# Patient Record
Sex: FEMALE | Race: WHITE | NOT HISPANIC OR LATINO | Employment: OTHER | URBAN - METROPOLITAN AREA
[De-identification: names, ages, dates, MRNs, and addresses within clinical notes are randomized per-mention and may not be internally consistent; named-entity substitution may affect disease eponyms.]

---

## 2017-04-20 ENCOUNTER — TRANSCRIBE ORDERS (OUTPATIENT)
Dept: ADMINISTRATIVE | Facility: HOSPITAL | Age: 77
End: 2017-04-20

## 2017-04-20 DIAGNOSIS — Z12.31 VISIT FOR SCREENING MAMMOGRAM: Primary | ICD-10-CM

## 2017-06-05 ENCOUNTER — APPOINTMENT (OUTPATIENT)
Dept: MAMMOGRAPHY | Facility: HOSPITAL | Age: 77
End: 2017-06-05
Attending: OBSTETRICS & GYNECOLOGY

## 2017-07-17 ENCOUNTER — HOSPITAL ENCOUNTER (OUTPATIENT)
Dept: MAMMOGRAPHY | Facility: HOSPITAL | Age: 77
Discharge: HOME | End: 2017-07-17
Attending: OBSTETRICS & GYNECOLOGY | Admitting: OBSTETRICS & GYNECOLOGY

## 2017-07-17 DIAGNOSIS — Z12.31 VISIT FOR SCREENING MAMMOGRAM: ICD-10-CM

## 2017-07-17 PROCEDURE — G0202 SCR MAMMO BI INCL CAD: HCPCS | Performed by: RADIOLOGY

## 2017-07-17 PROCEDURE — 77063 BREAST TOMOSYNTHESIS BI: CPT

## 2017-07-17 PROCEDURE — G0202 SCR MAMMO BI INCL CAD: HCPCS

## 2017-07-17 PROCEDURE — 77063 BREAST TOMOSYNTHESIS BI: CPT | Performed by: RADIOLOGY

## 2018-06-11 ENCOUNTER — TRANSCRIBE ORDERS (OUTPATIENT)
Dept: ADMINISTRATIVE | Facility: HOSPITAL | Age: 78
End: 2018-06-11

## 2018-06-11 DIAGNOSIS — Z12.31 VISIT FOR SCREENING MAMMOGRAM: Primary | ICD-10-CM

## 2018-07-19 ENCOUNTER — APPOINTMENT (OUTPATIENT)
Dept: MAMMOGRAPHY | Facility: HOSPITAL | Age: 78
End: 2018-07-19
Attending: FAMILY MEDICINE

## 2018-07-20 ENCOUNTER — APPOINTMENT (OUTPATIENT)
Dept: MAMMOGRAPHY | Facility: HOSPITAL | Age: 78
End: 2018-07-20
Attending: FAMILY MEDICINE

## 2018-08-03 ENCOUNTER — HOSPITAL ENCOUNTER (OUTPATIENT)
Dept: MAMMOGRAPHY | Facility: HOSPITAL | Age: 78
Discharge: HOME | End: 2018-08-03
Attending: FAMILY MEDICINE | Admitting: FAMILY MEDICINE

## 2018-08-03 DIAGNOSIS — Z12.31 VISIT FOR SCREENING MAMMOGRAM: ICD-10-CM

## 2018-08-03 PROCEDURE — 77067 SCR MAMMO BI INCL CAD: CPT | Performed by: RADIOLOGY

## 2018-08-03 PROCEDURE — 77063 BREAST TOMOSYNTHESIS BI: CPT | Performed by: RADIOLOGY

## 2018-08-03 PROCEDURE — 77067 SCR MAMMO BI INCL CAD: CPT

## 2018-08-03 PROCEDURE — 77063 BREAST TOMOSYNTHESIS BI: CPT

## 2019-01-17 ENCOUNTER — TRANSCRIBE ORDERS (OUTPATIENT)
Dept: ADMINISTRATIVE | Facility: HOSPITAL | Age: 79
End: 2019-01-17

## 2019-01-17 DIAGNOSIS — R51.9 NONINTRACTABLE HEADACHE, UNSPECIFIED CHRONICITY PATTERN, UNSPECIFIED HEADACHE TYPE: Primary | ICD-10-CM

## 2019-06-24 ENCOUNTER — TRANSCRIBE ORDERS (OUTPATIENT)
Dept: ADMINISTRATIVE | Facility: HOSPITAL | Age: 79
End: 2019-06-24

## 2019-06-24 DIAGNOSIS — Z12.31 VISIT FOR SCREENING MAMMOGRAM: Primary | ICD-10-CM

## 2019-08-05 ENCOUNTER — HOSPITAL ENCOUNTER (OUTPATIENT)
Dept: MAMMOGRAPHY | Facility: HOSPITAL | Age: 79
Discharge: HOME | End: 2019-08-05
Admitting: FAMILY MEDICINE

## 2019-08-05 DIAGNOSIS — Z12.31 VISIT FOR SCREENING MAMMOGRAM: ICD-10-CM

## 2019-08-05 PROCEDURE — 77063 BREAST TOMOSYNTHESIS BI: CPT

## 2019-08-05 PROCEDURE — 77063 BREAST TOMOSYNTHESIS BI: CPT | Performed by: RADIOLOGY

## 2019-08-05 PROCEDURE — 77067 SCR MAMMO BI INCL CAD: CPT

## 2019-08-05 PROCEDURE — 77067 SCR MAMMO BI INCL CAD: CPT | Performed by: RADIOLOGY

## 2019-08-07 ENCOUNTER — TRANSCRIBE ORDERS (OUTPATIENT)
Dept: ADMINISTRATIVE | Facility: HOSPITAL | Age: 79
End: 2019-08-07

## 2019-08-07 DIAGNOSIS — Z12.31 VISIT FOR SCREENING MAMMOGRAM: Primary | ICD-10-CM

## 2020-08-06 ENCOUNTER — HOSPITAL ENCOUNTER (OUTPATIENT)
Dept: MAMMOGRAPHY | Facility: HOSPITAL | Age: 80
Discharge: HOME | End: 2020-08-06
Admitting: FAMILY MEDICINE

## 2020-08-06 DIAGNOSIS — Z12.31 VISIT FOR SCREENING MAMMOGRAM: ICD-10-CM

## 2020-08-06 PROCEDURE — 77063 BREAST TOMOSYNTHESIS BI: CPT | Performed by: RADIOLOGY

## 2020-08-06 PROCEDURE — 77067 SCR MAMMO BI INCL CAD: CPT | Performed by: RADIOLOGY

## 2020-08-06 PROCEDURE — 77067 SCR MAMMO BI INCL CAD: CPT

## 2020-08-06 PROCEDURE — 77063 BREAST TOMOSYNTHESIS BI: CPT

## 2020-08-27 ENCOUNTER — RESULTS ENCOUNTER (OUTPATIENT)
Dept: NEUROLOGY | Facility: CLINIC | Age: 80
End: 2020-08-27

## 2020-08-27 ENCOUNTER — LAB REQUISITION (OUTPATIENT)
Dept: LAB | Facility: HOSPITAL | Age: 80
End: 2020-08-27

## 2020-08-27 ENCOUNTER — OFFICE VISIT (OUTPATIENT)
Dept: NEUROLOGY | Facility: CLINIC | Age: 80
End: 2020-08-27

## 2020-08-27 VITALS
OXYGEN SATURATION: 97 % | TEMPERATURE: 96.9 F | WEIGHT: 116 LBS | DIASTOLIC BLOOD PRESSURE: 70 MMHG | HEIGHT: 61 IN | BODY MASS INDEX: 21.9 KG/M2 | SYSTOLIC BLOOD PRESSURE: 118 MMHG | HEART RATE: 67 BPM

## 2020-08-27 DIAGNOSIS — R20.0 NUMBNESS AND TINGLING OF BOTH FEET: ICD-10-CM

## 2020-08-27 DIAGNOSIS — R27.0 ATAXIA: Primary | ICD-10-CM

## 2020-08-27 DIAGNOSIS — R20.2 NUMBNESS AND TINGLING OF BOTH FEET: ICD-10-CM

## 2020-08-27 DIAGNOSIS — R79.9 ABNORMAL FINDING OF BLOOD CHEMISTRY, UNSPECIFIED: ICD-10-CM

## 2020-08-27 DIAGNOSIS — R42 DIZZINESS: ICD-10-CM

## 2020-08-27 DIAGNOSIS — Z00.00 ROUTINE GENERAL MEDICAL EXAMINATION AT A HEALTH CARE FACILITY: ICD-10-CM

## 2020-08-27 PROCEDURE — 36415 COLL VENOUS BLD VENIPUNCTURE: CPT | Performed by: NURSE PRACTITIONER

## 2020-08-27 PROCEDURE — 99204 OFFICE O/P NEW MOD 45 MIN: CPT | Performed by: NURSE PRACTITIONER

## 2020-08-27 RX ORDER — LORATADINE 10 MG/1
TABLET ORAL DAILY
COMMUNITY
End: 2022-04-18

## 2020-08-27 RX ORDER — IRBESARTAN 75 MG/1
TABLET ORAL
COMMUNITY
Start: 2020-08-13 | End: 2020-12-03

## 2020-08-27 RX ORDER — OMEPRAZOLE 20 MG/1
CAPSULE, DELAYED RELEASE ORAL
COMMUNITY
Start: 2020-08-25 | End: 2021-06-30 | Stop reason: SDUPTHER

## 2020-08-27 NOTE — PROGRESS NOTES
Subjective:     Patient ID: Cecile Bennett is a 80 y.o. female.    CC:   Chief Complaint   Patient presents with   • Dizziness       HPI:   History of Present Illness   Is a very pleasant 80-year-old female who presents to establish care with neurology for dizziness present for several years.  She was referred by her primary care provider for further evaluation.  She tells me she cannot recall the exact date of when her dizziness began but she knows it is been over 2-1/2 years.  She did have an MRI of the brain without contrast on 6/4/2013 for some left facial pain and headaches and she cannot remember if she was having dizziness at the time or not.  We did review the imaging and radiology report today in office which was completed at [facility].  She does have age-related general cerebral atrophy with no significant white matter changes and no acute intracranial abnormalities.  Radiologist also read as normal for age.  In June 2018 she went and saw Dr. Hugh Spicer with [facility] and he was unsure of the exact cause of her dizziness but have referred her to [facility] for tilt table test which the patient tells me was normal, for vestibular rehab which she tells me was not helpful, Epley maneuver which was not helpful and eventually she just did not return because she still no significant improvement.  She tells me she has tried meclizine and this did not help her symptoms at all.  She tells me she gets dizzy with position changes such as bending over or getting up from a chair.  She does have a cardiologist Dr. Luli Moreno with [facility] which she sees every 6 months.  I can see some of the notes in epic for review and it does show that she has carotid artery stenosis, dizziness, aortic valve stenosis and a history of cardiac arrhythmia.  She is not on aspirin due to allergy.  She is not on a statin.  She is on one blood pressure medication.  Otherwise she has been very  healthy.  She does not get any more headaches except very rarely with a history of migraine.  She tells me she is very active and gets up very quickly and when she stands she feels like she is going to fall backwards or to the side and has to hold onto a wall.  She does admit that she does not drink much water.  She has not had any falls in 2 years.  She does have near falls on a daily basis.  She feels that her dizziness may be getting worse.  She denies any urinary or bowel incontinence, denies any confusion or memory issues.  She does note that she has some numbness and tingling in both of her toes more on the left side than the right side.  She has had this for a few years but never really thought anything about it.  She is not diabetic, does not have any history of tobacco or alcohol abuse.  She did have breast cancer on the right side and underwent radiation therapy and lumpectomy.  She denies any head injuries, concussions or any known calls for her symptoms.  She has no dizziness with sitting, rolling over in bed or laying flat.    Orthostatic blood pressure checks today laying down blood pressure 130/70, heart rate 60 and oxygen saturation 98%.  Sitting blood pressure 120/60, heart rate 61 and oxygen saturation 98%.  Blood pressure standing 120/70, heart rate 62 and 98% oxygen saturation all on room air.    The following portions of the patient's history were reviewed and updated as appropriate: allergies, current medications, past family history, past medical history, past social history, past surgical history and problem list.    Past Medical History:   Diagnosis Date   • Breast cancer (CMS[facility]) 2009    RIGHT   • Hx of radiation therapy 2009    RT BREAST CANCER   • Osteoporosis        Past Surgical History:   Procedure Laterality Date   • BREAST BIOPSY Right 2009    Stereotactic biopsy   • BREAST LUMPECTOMY Right 2009       Social History     Socioeconomic History   • Marital status:      Spouse  name: Not on file   • Number of children: Not on file   • Years of education: Not on file   • Highest education level: Not on file   Tobacco Use   • Smoking status: Former Smoker     Last attempt to quit:      Years since quittin.6   • Smokeless tobacco: Never Used   Substance and Sexual Activity   • Alcohol use: Yes     Frequency: 2-4 times a month     Drinks per session: 1 or 2   • Drug use: Never   • Sexual activity: Not Currently       Family History   Problem Relation Age of Onset   • Breast cancer Sister 67   • Breast cancer Cousin 40   • Dementia Brother    • Pancreatic cancer Brother    • Stroke Maternal Grandmother    • Ovarian cancer Neg Hx         Review of Systems   Constitutional: Negative for chills, fatigue, fever and unexpected weight change.   HENT: Negative for ear pain, hearing loss, nosebleeds, rhinorrhea and sore throat.    Eyes: Negative for photophobia, pain, discharge, itching and visual disturbance.   Respiratory: Negative for cough, chest tightness, shortness of breath and wheezing.    Cardiovascular: Negative for chest pain, palpitations and leg swelling.   Gastrointestinal: Negative for abdominal pain, blood in stool, constipation, diarrhea, nausea and vomiting.   Genitourinary: Negative for dysuria, frequency, hematuria and urgency.   Musculoskeletal: Negative for arthralgias, back pain, gait problem, joint swelling, myalgias, neck pain and neck stiffness.   Skin: Negative for rash and wound.   Allergic/Immunologic: Negative for environmental allergies and food allergies.   Neurological: Negative for dizziness, tremors, seizures, syncope, speech difficulty, weakness, light-headedness, numbness and headaches.   Hematological: Negative for adenopathy. Does not bruise/bleed easily.   Psychiatric/Behavioral: Negative for agitation, confusion, decreased concentration, hallucinations, sleep disturbance and suicidal ideas. The patient is not nervous/anxious.         Objective:  BP  "118/70   Pulse 67   Temp 96.9 °F (36.1 °C)   Ht 154.9 cm (61\")   Wt 52.6 kg (116 lb)   SpO2 97%   BMI 21.92 kg/m²     Neurologic Exam     Mental Status   Oriented to person, place, and time.   Registration: recalls 3 of 3 objects. Recall at 5 minutes: recalls 3 of 3 objects. Follows 3 step commands.   Attention: normal. Concentration: normal.   Speech: speech is normal   Level of consciousness: alert  Knowledge: good and consistent with education. Able to perform simple calculations.   Able to name object. Able to read. Able to repeat. Able to write. Normal comprehension.     Cranial Nerves   Cranial nerves II through XII intact.     CN III, IV, VI   Nystagmus: none     No dizziness or nystagmus with EOMs     Motor Exam   Muscle bulk: normal  Overall muscle tone: normal    Strength   Strength 5/5 throughout.     Sensory Exam   Light touch normal.   Right leg vibration: decreased from toes  Left leg vibration: decreased from toes (more on left than right and chronic per patient)  Proprioception normal.   Pinprick normal.     Gait, Coordination, and Reflexes     Gait  Gait: (ataxia, some mild shuffling, no tremor, good arm swing, sway with position changes sitting to standing and turning)    Coordination   Romberg: positive (falls, leans backward with eyes closed)  Finger to nose coordination: normal  Heel to shin coordination: normal  Tandem walking coordination: abnormal    Tremor   Resting tremor: absent  Intention tremor: absent  Action tremor: absent    Reflexes   Right brachioradialis: 2+  Left brachioradialis: 2+  Right biceps: 2+  Left biceps: 2+  Right triceps: 2+  Left triceps: 2+  Right patellar: 1+  Left patellar: 1+  Right achilles: 1+  Left achilles: 1+  Right : 2+  Left : 2+  Right plantar: normal  Left plantar: normal  Right Thompson: absent  Left Thompson: absent  Right ankle clonus: absent  Left ankle clonus: absent      Physical Exam   Constitutional: She is oriented to person, place, " and time. She appears well-developed and well-nourished.   Eyes:   Fundoscopic exam:       The right eye shows red reflex.        The left eye shows red reflex.   Neck: Carotid bruit is not present.   Cardiovascular: Normal rate, regular rhythm, S1 normal, S2 normal and normal heart sounds.   Pulses:       Dorsalis pedis pulses are 1+ on the right side, and 1+ on the left side.        Posterior tibial pulses are 1+ on the right side, and 1+ on the left side.     1+ edema left ankle, trace right ankle, chronic per patient   Pulmonary/Chest: Effort normal and breath sounds normal.   Neurological: She is oriented to person, place, and time. She has normal strength. She has an abnormal Romberg Test (falls, leans backward with eyes closed) and an abnormal Tandem Gait Test. She has a normal Finger-Nose-Finger Test and a normal Heel to Shin Test.   Reflex Scores:       Tricep reflexes are 2+ on the right side and 2+ on the left side.       Bicep reflexes are 2+ on the right side and 2+ on the left side.       Brachioradialis reflexes are 2+ on the right side and 2+ on the left side.       Patellar reflexes are 1+ on the right side and 1+ on the left side.       Achilles reflexes are 1+ on the right side and 1+ on the left side.  Psychiatric: She has a normal mood and affect. Her speech is normal and behavior is normal. Judgment and thought content normal. Cognition and memory are normal.       Assessment/Plan:       Cecile was seen today for dizziness.    Diagnoses and all orders for this visit:    Ataxia  -     MRI Brain With & Without Contrast; Future  -     Ambulatory Referral to Physical Therapy Evaluate and treat, Neuro    Dizziness  -     MRI Brain With & Without Contrast; Future  -     Ambulatory Referral to Physical Therapy Evaluate and treat, Neuro    Numbness and tingling of both feet  -     EMG & Nerve Conduction Test; Future  -     Methylmalonic Acid, Serum; Future  -     Vitamin B12 & Folate; Future  -      Hemoglobin A1c; Future  -     C-reactive Protein; Future  -     Sedimentation Rate; Future  -     CK Total & CKMB; Future    Abnormal finding of blood chemistry, unspecified   -     Hemoglobin A1c; Future           She has had dizziness for many years.  Will order MRI of the brain to rule out any etiology especially with history of cancer.  Referral to physical therapy for balance therapy.  She does have a guarded, somewhat ataxic and some shuffling gait but I do not see any other symptoms of Parkinson's disease.  This may be due to her trying to keep her balance.  She does have numbness and tingling of her feet which could be a sign of neuropathy which could also worsen balance I recommend labs and EMG with [facility].  We are going to follow-up in office in 6 to 8 weeks to reevaluate.  She prefers no medications.  I also recommend with the note of carotid artery stenosis and aortic valve stenosis that she see her cardiologist as recommended.  She is currently not on aspirin due to allergy and not on statin but I am not sure the reasoning for this.  Would recommend she discuss with cardiology if statin therapy would be appropriate for the carotid artery stenosis.  Reviewed medications, potential side effects and signs and symptoms to report. Discussed risk versus benefits of treatment plan with patient and/or family-including medications, labs and radiology that may be ordered. Addressed questions and concerns during visit. Patient and/or family verbalized understanding and agree with plan.    AS THE PROVIDER, I PERSONALLY WORE PPE DURING ENTIRE FACE TO FACE ENCOUNTER IN CLINIC WITH THE PATIENT. PATIENT ALSO WORE PPE DURING ENTIRE FACE TO FACE ENCOUNTER EXCEPT FOR A MAX OF 30 SECONDS DURING NEUROLOGICAL EVALUATION OF CRANIAL NERVES AND THEN MASK WAS PLACED BACK OVER PATIENT FACE FOR REMAINDER OF VISIT. I WASHED MY HANDS BEFORE AND AFTER VISIT.    During this visit the following were done:  Labs Reviewed [x]    Labs  Ordered []    Radiology Reports Reviewed [x]    Radiology Ordered [x]    PCP Records Reviewed [x]    Referring Provider Records Reviewed [x]    ER Records Reviewed []    Hospital Records Reviewed []    History Obtained From Family []    Radiology Images Reviewed [x]    Other Reviewed [x]    Records Requested []      Rianna Steward, APRN  8/27/2020

## 2020-08-30 LAB
CK MB SERPL-MCNC: 1.5 NG/ML (ref 0–5.3)
CK SERPL-CCNC: 122 U/L (ref 32–182)
CRP SERPL-MCNC: <1 MG/L (ref 0–10)
ERYTHROCYTE [SEDIMENTATION RATE] IN BLOOD BY WESTERGREN METHOD: 21 MM/HR (ref 0–40)
FOLATE SERPL-MCNC: 9.5 NG/ML
HBA1C MFR BLD: 5.9 % (ref 4.8–5.6)
Lab: NORMAL
METHYLMALONATE SERPL-SCNC: 162 NMOL/L (ref 0–378)
VIT B12 SERPL-MCNC: 539 PG/ML (ref 232–1245)

## 2020-08-31 ENCOUNTER — TELEPHONE (OUTPATIENT)
Dept: NEUROLOGY | Facility: CLINIC | Age: 80
End: 2020-08-31

## 2020-08-31 NOTE — TELEPHONE ENCOUNTER
----- Message from ADELE Steve sent at 8/31/2020  9:51 AM EDT -----  Please notify the patient that her blood work overall looks excellent.  Her muscle enzymes, vitamin B12, folic acid and inflammatory markers are all normal.  Her diabetes testing shows her in the prediabetes range at 5.9% and anything over 6.4 is considered diabetes.  She can monitor this with her PCP every 6-12 months.  Please fax a copy of labs to her PCP.  Thanks, ADELE Ricks.

## 2020-08-31 NOTE — PROGRESS NOTES
Please notify the patient that her blood work overall looks excellent.  Her muscle enzymes, vitamin B12, folic acid and inflammatory markers are all normal.  Her diabetes testing shows her in the prediabetes range at 5.9% and anything over 6.4 is considered diabetes.  She can monitor this with her PCP every 6-12 months.  Please fax a copy of labs to her PCP.  Thanks, ADELE Ricks.

## 2020-09-02 ENCOUNTER — TELEPHONE (OUTPATIENT)
Dept: NEUROLOGY | Facility: CLINIC | Age: 80
End: 2020-09-02

## 2020-09-02 NOTE — TELEPHONE ENCOUNTER
PT WANTED TO CALL IN AND ADVISE FLOR PAINTER THAT HER INSURANCE WILL NOT BE COVERING MUCH ON THE PRICE OF THE MRI OR THE EMG AND THERES NO WAY SHE CAN BE ABLE TO DO THESE TEST ANY TIME SOON.       Cecile Bennett (Self) [phone] (H)       SHE IS GOING TO SEE IF BILLING WILL HELP HER WITH A PAYMENT PLAN FIRST.

## 2020-09-02 NOTE — TELEPHONE ENCOUNTER
The emg/ncvs can wait and can be cancelled for now. I would at least get the MRI if possible so we can figure out what is causing her symptoms not to improve. She can contact the Financial assistance department at [facility] for MRI payment assistance-this is income based and they can provide additional information for her-that number is #[phone]. She can call them. Thanks, Rianna

## 2020-09-19 ENCOUNTER — HOSPITAL ENCOUNTER (OUTPATIENT)
Dept: MRI IMAGING | Facility: HOSPITAL | Age: 80
Discharge: HOME | End: 2020-09-19
Admitting: NURSE PRACTITIONER

## 2020-09-19 DIAGNOSIS — R27.0 ATAXIA: ICD-10-CM

## 2020-09-19 DIAGNOSIS — R42 DIZZINESS: ICD-10-CM

## 2020-09-19 PROCEDURE — 70551 MRI BRAIN STEM W/O DYE: CPT

## 2020-09-21 ENCOUNTER — TELEPHONE (OUTPATIENT)
Dept: NEUROLOGY | Facility: CLINIC | Age: 80
End: 2020-09-21

## 2020-09-21 NOTE — PROGRESS NOTES
Please notify the patient that the MRI of her brain without contrast appears normal for her age.  No strokes, brain lesions, tumors or other abnormalities are seen.  They were not able to complete the contrasted study due to her metal allergy.  Her brain looks very healthy and normal for her age.  We will follow-up as scheduled in office.  Thanks, ADELE Ricks.  Please fax a copy of MRI of the brain to PCP.

## 2020-09-21 NOTE — TELEPHONE ENCOUNTER
She does not have to have the emg/ncvs but we will not be able to confirm the neuropathy diagnosis. We can treat her symptomatically if she does not want additional testing done.

## 2020-09-21 NOTE — TELEPHONE ENCOUNTER
----- Message from ADELE Steve sent at 9/21/2020 10:31 AM EDT -----  Please notify the patient that the MRI of her brain without contrast appears normal for her age.  No strokes, brain lesions, tumors or other abnormalities are seen.  They were not able to complete the contrasted study due to her metal allergy.  Her brain looks very healthy and normal for her age.  We will follow-up as scheduled in office.  Thanks, ADELE Ricks.  Please fax a copy of MRI of the brain to PCP.

## 2020-09-21 NOTE — TELEPHONE ENCOUNTER
Pt is aware of the results and would like to know if she absolutely needs the EMG, she is afraid of all the needles and pain, or if there is another test in place of this one?

## 2020-10-08 ENCOUNTER — OFFICE VISIT (OUTPATIENT)
Dept: NEUROLOGY | Facility: CLINIC | Age: 80
End: 2020-10-08

## 2020-10-08 VITALS
WEIGHT: 116 LBS | BODY MASS INDEX: 22.78 KG/M2 | TEMPERATURE: 97.4 F | DIASTOLIC BLOOD PRESSURE: 78 MMHG | HEART RATE: 78 BPM | HEIGHT: 60 IN | OXYGEN SATURATION: 98 % | SYSTOLIC BLOOD PRESSURE: 122 MMHG

## 2020-10-08 DIAGNOSIS — R20.0 NUMBNESS AND TINGLING OF BOTH FEET: ICD-10-CM

## 2020-10-08 DIAGNOSIS — R27.0 ATAXIA: Primary | ICD-10-CM

## 2020-10-08 DIAGNOSIS — R42 DIZZINESS: ICD-10-CM

## 2020-10-08 DIAGNOSIS — R20.2 NUMBNESS AND TINGLING OF BOTH FEET: ICD-10-CM

## 2020-10-08 PROCEDURE — 99214 OFFICE O/P EST MOD 30 MIN: CPT | Performed by: NURSE PRACTITIONER

## 2020-10-08 RX ORDER — MECLIZINE HCL 12.5 MG/1
12.5 TABLET ORAL 2 TIMES DAILY PRN
Qty: 60 TABLET | Refills: 1 | Status: SHIPPED | OUTPATIENT
Start: 2020-10-08 | End: 2020-12-03

## 2020-10-08 NOTE — PROGRESS NOTES
Subjective:     Patient ID: Cecile Bennett is a 80 y.o. female.    CC:   Chief Complaint   Patient presents with   • Peripheral Neuropathy   • Dizziness       HPI:   History of Present Illness   This is a very pleasant 80-year-old female who presents to 6 week follow up on chronic daily dizziness present about 2.5+ years ago.  Since last visit she had an MRI of the brain which had to be changed from with and without contrast without contrast due to a metal allergy.  MRI of the brain without contrast imaging and radiology report reviewed today in office with the patient and I agree with the radiologist that there is mild age-related generalized cerebral atrophy with mild chronic small vessel ischemic changes with no acute intracranial abnormalities seen.  She did not go to physical therapy and honestly forgot about it but is willing to retry it since it is been several years.  She also wonders if trying low-dose meclizine again may be helpful since it is been several years.  She has had no falls.  She has also had some numbness and tingling in her great toes on both sides and feet and this is been for many years.  She tells me there is no pain.  We did order EMG and NCVS last time but she is decided not to go through with this.  She denies any neck or back pain.  She did have labs since last visit including methylmalonic acid normal at 162, vitamin B12 539 and folate 9.5 both normal, , hemoglobin A1c 5.9%, sed rate 21, CRP less than 1.  She does have known carotid artery stenosis but cannot take aspirin is not on statin therapy and follows with cardiology every 6 months.  She feels like her dizziness is slightly better. Worsened when she wakes up in the morning and gets out of bed or looking down with walking down stairs, she tries to look straight and hold rails and this helps. he tells me she is very active and gets up very quickly and when she stands she feels like she is going to fall backwards or to the  side and has to hold onto a wall.  She does admit that she does not drink much water.  She has not had any falls in 2 years.  She does have near falls on a daily basis.  She feels that her dizziness may be getting worse.  She denies any urinary or bowel incontinence, denies any confusion or memory issues.  She does note that she has some numbness and tingling in both of her toes more on the left side than the right side.  She has had this for a few years but never really thought anything about it.  She is not diabetic, does not have any history of tobacco or alcohol abuse.  She did have breast cancer on the right side and underwent radiation therapy and lumpectomy.  She denies any head injuries, concussions or any known cause for her symptoms.  She has no dizziness with sitting, rolling over in bed or laying flat.      Previous history and workup: She tells me she cannot recall the exact date of when her dizziness began but she knows it is been over 2-1/2 years.  She did have an MRI of the brain without contrast on 6/4/2013 for some left facial pain and headaches and she cannot remember if she was having dizziness at the time or not. She does have age-related general cerebral atrophy with no significant white matter changes and no acute intracranial abnormalities.  Radiologist also read as normal for age.  In June 2018 she went and saw Dr. Hugh Spicer with [facility] and he was unsure of the exact cause of her dizziness but have referred her to [facility] for tilt table test which the patient tells me was normal, for vestibular rehab which she tells me was not helpful, Epley maneuver which was not helpful and eventually she just did not return because she still no significant improvement.  She tells me she has tried meclizine and this did not help her symptoms at all.  She tells me she gets dizzy with position changes such as bending over or getting up from a chair.  She does have a cardiologist  Dr. Luli Moreno with [facility] which she sees every 6 months.  I can see some of the notes in epic for review and it does show that she has carotid artery stenosis, dizziness, aortic valve stenosis and a history of cardiac arrhythmia.  She is not on aspirin due to allergy.  She is not on a statin.     The following portions of the patient's history were reviewed and updated as appropriate: allergies, current medications, past family history, past medical history, past social history, past surgical history and problem list.    Past Medical History:   Diagnosis Date   • Breast cancer (CMS/HCC) 2009    RIGHT   • Hx of radiation therapy 2009    RT BREAST CANCER   • Osteoporosis        Past Surgical History:   Procedure Laterality Date   • BREAST BIOPSY Right 2009    Stereotactic biopsy   • BREAST LUMPECTOMY Right 2009       Social History     Socioeconomic History   • Marital status:      Spouse name: Not on file   • Number of children: Not on file   • Years of education: Not on file   • Highest education level: Not on file   Tobacco Use   • Smoking status: Former Smoker     Quit date:      Years since quittin.7   • Smokeless tobacco: Never Used   Substance and Sexual Activity   • Alcohol use: Yes     Frequency: 2-4 times a month     Drinks per session: 1 or 2   • Drug use: Never   • Sexual activity: Not Currently       Family History   Problem Relation Age of Onset   • Breast cancer Sister 67   • Breast cancer Cousin 40   • Dementia Brother    • Pancreatic cancer Brother    • Stroke Maternal Grandmother    • Ovarian cancer Neg Hx         Review of Systems   Constitutional: Negative for chills, fatigue, fever and unexpected weight change.   HENT: Negative for ear pain, hearing loss, nosebleeds, rhinorrhea and sore throat.    Eyes: Negative for photophobia, pain, discharge, itching and visual disturbance.   Respiratory: Negative for cough, chest tightness, shortness of breath and wheezing.   "  Cardiovascular: Negative for chest pain, palpitations and leg swelling.   Gastrointestinal: Negative for abdominal pain, blood in stool, constipation, diarrhea, nausea and vomiting.   Genitourinary: Negative for dysuria, frequency, hematuria and urgency.   Musculoskeletal: Negative for arthralgias, back pain, gait problem, joint swelling, myalgias, neck pain and neck stiffness.   Skin: Negative for rash and wound.   Allergic/Immunologic: Negative for environmental allergies and food allergies.   Neurological: Negative for dizziness, tremors, seizures, syncope, speech difficulty, weakness, light-headedness, numbness and headaches.   Hematological: Negative for adenopathy. Does not bruise/bleed easily.   Psychiatric/Behavioral: Negative for agitation, confusion, decreased concentration, hallucinations, sleep disturbance and suicidal ideas. The patient is not nervous/anxious.    All other systems reviewed and are negative.       Objective:  /78   Pulse 78   Temp 97.4 °F (36.3 °C)   Ht 152.4 cm (60\")   Wt 52.6 kg (116 lb)   SpO2 98%   BMI 22.65 kg/m²     Neurologic Exam     Mental Status   Oriented to person, place, and time.   Speech: speech is normal   Level of consciousness: alert    Cranial Nerves   Cranial nerves II through XII intact.     Motor Exam   Muscle bulk: normal  Overall muscle tone: normal    Strength   Strength 5/5 throughout.     Gait, Coordination, and Reflexes     Gait  Gait: wide-based (good arm swing, no ataxia or swaying today, steady gait)    Coordination   Romberg: negative (improved today)  Finger to nose coordination: normal  Heel to shin coordination: normal  Tandem walking coordination: abnormal    Tremor   Resting tremor: absent  Intention tremor: absent  Action tremor: absent    Reflexes   Right brachioradialis: 2+  Left brachioradialis: 2+  Right biceps: 2+  Left biceps: 2+  Right triceps: 2+  Left triceps: 2+  Right patellar: 2+  Left patellar: 2+  Right achilles: 2+  Left " achilles: 2+  Right : 2+  Left : 2+      Physical Exam  Neurological:      Mental Status: She is oriented to person, place, and time.      Coordination: Finger-Nose-Finger Test, Heel to Shin Test and Romberg Test (improved today) normal.      Gait: Tandem walk abnormal.      Deep Tendon Reflexes: Strength normal.      Reflex Scores:       Tricep reflexes are 2+ on the right side and 2+ on the left side.       Bicep reflexes are 2+ on the right side and 2+ on the left side.       Brachioradialis reflexes are 2+ on the right side and 2+ on the left side.       Patellar reflexes are 2+ on the right side and 2+ on the left side.       Achilles reflexes are 2+ on the right side and 2+ on the left side.  Psychiatric:         Speech: Speech normal.         Assessment/Plan:       Cecile was seen today for peripheral neuropathy and dizziness.    Diagnoses and all orders for this visit:    Ataxia  Comments:  MRI brain w/o contrast appears normal for age. No acute findings. continue to monitor. No falls. Slightly improved.  Orders:  -     Ambulatory Referral to Physical Therapy Evaluate and treat, Neuro, Vestibular    Dizziness  Comments:  MRI brain w/o contrast appears normal for age. No acute findings. continue to monitor.  Orders:  -     Ambulatory Referral to Physical Therapy Evaluate and treat, Neuro, Vestibular  -     meclizine (ANTIVERT) 12.5 MG tablet; Take 1 tablet by mouth 2 (Two) Times a Day As Needed for Dizziness.    Numbness and tingling of both feet  Comments:  minimal, no pain, wants to wait on emg/ncvs           Reviewed MRI of the brain.  We will go ahead and order physical therapy to retry and low-dose meclizine if needed.  MRI of the brain is reassuring.  Could consider CTA of head and neck however she has known carotid stenosis and cannot take aspirin and does not wish to be on statin therapy.  We will leave that up to cardiology when she follows up with him in January if they feel she needs  additional testing but none of her symptoms are acute and may have improved significantly.  Follow-up in 8 weeks or sooner if needed.  Reviewed medications, potential side effects and signs and symptoms to report. Discussed risk versus benefits of treatment plan with patient and/or family-including medications, labs and radiology that may be ordered. Addressed questions and concerns during visit. Patient and/or family verbalized understanding and agree with plan.    AS THE PROVIDER, I PERSONALLY WORE PPE DURING ENTIRE FACE TO FACE ENCOUNTER IN CLINIC WITH THE PATIENT. PATIENT ALSO WORE PPE DURING ENTIRE FACE TO FACE ENCOUNTER EXCEPT FOR A MAX OF 30 SECONDS DURING NEUROLOGICAL EVALUATION OF CRANIAL NERVES AND THEN MASK WAS PLACED BACK OVER PATIENT FACE FOR REMAINDER OF VISIT. I WASHED MY HANDS BEFORE AND AFTER VISIT.    During this visit the following were done:  Labs Reviewed [x]    Labs Ordered []    Radiology Reports Reviewed [x]    Radiology Ordered []    PCP Records Reviewed []    Referring Provider Records Reviewed []    ER Records Reviewed []    Hospital Records Reviewed []    History Obtained From Family []    Radiology Images Reviewed [x]    Other Reviewed [x]    Records Requested []      ADELE Steve  10/8/2020

## 2020-10-19 ENCOUNTER — TREATMENT (OUTPATIENT)
Dept: PHYSICAL THERAPY | Facility: CLINIC | Age: 80
End: 2020-10-19

## 2020-10-19 DIAGNOSIS — R42 VERTIGO: Primary | ICD-10-CM

## 2020-10-19 DIAGNOSIS — Z74.09 IMPAIRED FUNCTIONAL MOBILITY, BALANCE, GAIT, AND ENDURANCE: ICD-10-CM

## 2020-10-19 PROCEDURE — 95992 CANALITH REPOSITIONING PROC: CPT | Performed by: PHYSICAL THERAPIST

## 2020-10-19 PROCEDURE — 97162 PT EVAL MOD COMPLEX 30 MIN: CPT | Performed by: PHYSICAL THERAPIST

## 2020-10-19 NOTE — PROGRESS NOTES
Physical Therapy Initial Evaluation-  Vestibular Therapy    Patient Name: Cecile Bennett       Patient MRN: [id number]  : 1940  Physician:Rianna Steward APRN  Date: 10/19/2020  Encounter Diagnoses   Name Primary?   • Vertigo Yes   • Impaired functional mobility, balance, gait, and endurance        Subjective Evaluation    History of Present Illness  Date of onset: 2016  Mechanism of injury: Pt relates possibly 5 years ago she began having dizziness. There was no mechanism of injury, it was a gradual onset. Her dizziness is when she gets up too fast or sometimes when she just stands up. Going up and down steps, looking up/down, occasionally rolling and sitting up. When she is dizzy she reports an 8/10. When the dizziness hits she feels like she was stumbling. She has fallen one time because of it. Patient does live alone but does have children locally. She does live in a 2 story house. She denies changes with her vision. Denies frequent HA's. No CVA in history.     Quality of life: good    Pain  No pain reported    Social Support  Lives in: multiple-level home  Lives with: alone    Patient Goals  Patient goals for therapy: improved balance (decrease dizziness )          Objective Testing:             Symptom Behavior  Type of Dizziness: Lightheadedness, Unsteady with head/body turns, Spinning  Duration of Dizziness: Seconds, Minutes  VAS Intensity (0-10): 8  Relieving Factors: Slow movements, Rest  Occulomotor Exam Fixation Present  Occular ROM: Normal  Spontaneous Nystagmus: Absent  Smooth Pursuit: Left:, Saccadic  Saccades: Undershoots  Convergence: Abnormal           Positional Testing  Vertebrobasilar Artery Screen - Right: Negative  Vertebrobasilar Artery Screen - Left: Negative  Delphi-Hallpike Right: No nystagmus  Delphi-Hallpike Left: No nystagmus              Sensation  Sensation: Left Extremity Intact, Right Extremity Intact                 Exercise 1  Exercise Name 1: visual  tracking  Sets/Reps 1: 15  Exercise 2  Exercise Name 2: focusing with head turns   Sets/Reps 2: 15  Exercise 3  Exercise Name 3: head circles   Sets/Reps 3: 15  Exercise 4  Exercise Name 4: saccades  Sets/Reps 4: 15            THERAPY ASSESSMENT: Patient is a 80 y.o. female. Patient presents to physical therapy due to complaints of episodes of dizziness. This non descript dizziness has been ongoing for > 5 years. 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 She will be a candidate for skilled PT intervention to address vestibular habituation and balance.                      PLAN: Patient will be seen 1x/wk for 8 visits for vestibular habituation, balance re-training, along with appropriate HEP.     REHAB POTENTIAL: good            SHORT TERM GOALS: To be met in 4 weeks:  1. Patient is independent with HEP.  2. Patient will report at least 30% improvement in overall condition.    LONG TERM GOALS:To be met in 8 weeks:  1. Patient will report decreased disequilibrium/dizziness by at least 90%.  2. Patient will report no loss of balance with ADLs to demonstrate improved functional balance.  3. Patient denies dizziness with daily activity.    Timed Code Treatment: 0   Minutes     Total Treatment Time: 45      Minutes      PT SIGNATURE: Audelia Mooney, PT, PT, MSCS, CDP #[id number]  DATE TREATMENT INITIATED: 10/20/2020     Initial Certification  Certification Period: 1/18/2021  I certify that the therapy services are furnished while this patient is under my care.  The services outlined above are required by this patient, and will be reviewed every 90 days.     PHYSICIAN: Rianna Steward APRN      DATE:     Please sign and return via fax to [phone].. Thank you, [facility].

## 2020-10-26 ENCOUNTER — TELEPHONE (OUTPATIENT)
Dept: NEUROLOGY | Facility: CLINIC | Age: 80
End: 2020-10-26

## 2020-10-26 ENCOUNTER — TREATMENT (OUTPATIENT)
Dept: PHYSICAL THERAPY | Facility: CLINIC | Age: 80
End: 2020-10-26

## 2020-10-26 DIAGNOSIS — R42 VERTIGO: Primary | ICD-10-CM

## 2020-10-26 DIAGNOSIS — Z74.09 IMPAIRED FUNCTIONAL MOBILITY, BALANCE, GAIT, AND ENDURANCE: ICD-10-CM

## 2020-10-26 PROCEDURE — 97530 THERAPEUTIC ACTIVITIES: CPT | Performed by: PHYSICAL THERAPIST

## 2020-10-26 PROCEDURE — 97112 NEUROMUSCULAR REEDUCATION: CPT | Performed by: PHYSICAL THERAPIST

## 2020-10-26 NOTE — TELEPHONE ENCOUNTER
Ok, great. If we can make a note of who her cardiologist is, we can send our notes and testing as well as physical therapy notes to them. Please obtain their name and location. It is not with [facility].

## 2020-10-26 NOTE — TELEPHONE ENCOUNTER
Please notify the patient that I did receive a update from physical therapy expressing concern that some of her dizziness and feeling off balance could possibly be related to her blood pressure.  I would like to see if patient would like a referral to cardiology for further evaluation.  Please let me know if patient is open to this evaluation I will place that referral.  Thanks, ADELE Ricks.

## 2020-10-26 NOTE — TELEPHONE ENCOUNTER
I SPOKE WITH PT LETTING HER KNOW PT CONCERNS WITH DIZZINESS AND BALANCE ISSUES AND THE RECOMMENDATION TO BE REFERRED TO A CARDIOLOGIST AND SHE SAID SHE HAS A CARDIOLOGIST AND WILL F/U WITH THEM ASAP

## 2020-10-26 NOTE — PROGRESS NOTES
Physical Therapy Daily Progress Note  Visit: 2  Date of Initial Visit: Type: THERAPY  Noted: 10/19/2020    Patient: Cecile Bennett   : 1940  Diagnosis/ICD-10 Code:  Vertigo [R42]  Referring practitioner: CARMEN Steve*  Date of Initial Visit: Type: THERAPY  Noted: 10/19/2020  Today's Date: 10/26/2020  Patient seen for 2 sessions      Subjective:   Patient reports: she did the exercises and is still having dizziness randomly.   Pain: 0/10 - pain, 1/10- dizziness   Clinical Progress: unchanged  Home Program Compliance: Yes  Treatment has included: neuromuscular re-education and therapeutic activity    Objective   See Exercise, Manual, and Modality Logs for complete treatment.    PT Neuro   Exercise 1  Exercise Name 1: Nu-Step - warm up   Time: 3 min - evoked inc dizziness   Exercise 2  Exercise Name 2: reviewed HEP in standing - saccadic eye movement noted with turning head and visual tracking   Exercise 3  Exercise Name 3: Orthostatic panel performed: supine - 130/70, sitting - 130/70, standing - 120/80  Exercise 4  Exercise Name 4: static narrow stance, mod tandem and SLS   Exercise 5  Exercise Name 5: Administered new HEP to include: FT eyes closed and head turns, along with mod tandem     Assessment & Plan     Assessment  Assessment details: Patient with a complex history of dizziness. During treatment today, positional movements from supine to sit and sit to standing, as well as leaning/squatting caused dizziness, as did 3 min on the Nu-Step. This leads me to believe patient has a cardiac component to her dizziness. However she also demonstrated very poor static balance as well. Pt's referring provider was contacted regarding possible cardiac component.     Plan  Plan details: Continue with balance re-education.         Timed:  Manual Therapy:            0     mins  29780;  Therapeutic Exercise:    0    mins  36546;     Neuromuscular Destinee:    20    mins  40580;    Therapeutic Activity:      25      mins  31113;     Gait Trainin    mins  68550;     Electrical Stimulation:    0    mins  46266 ( );     Untimed:  Canalith Repositioning techniques _0_ [id number]      Timed Treatment:   45   mins   Total Treatment:     45   mins      Audelia Mooney PT, CAROLINT, MSCS, CDP  [place] License #: [id number]  Physical Therapist

## 2020-11-02 ENCOUNTER — TREATMENT (OUTPATIENT)
Dept: PHYSICAL THERAPY | Facility: CLINIC | Age: 80
End: 2020-11-02

## 2020-11-02 DIAGNOSIS — Z74.09 IMPAIRED FUNCTIONAL MOBILITY, BALANCE, GAIT, AND ENDURANCE: ICD-10-CM

## 2020-11-02 DIAGNOSIS — R42 VERTIGO: Primary | ICD-10-CM

## 2020-11-02 PROCEDURE — 97112 NEUROMUSCULAR REEDUCATION: CPT | Performed by: PHYSICAL THERAPIST

## 2020-11-02 PROCEDURE — 97110 THERAPEUTIC EXERCISES: CPT | Performed by: PHYSICAL THERAPIST

## 2020-11-02 NOTE — PROGRESS NOTES
Physical Therapy Daily Progress Note  Visit: 3  Date of Initial Visit: Type: THERAPY  Noted: 10/19/2020    Patient: Cecile Bennett   : 1940  Diagnosis/ICD-10 Code:  Vertigo [R42]  Referring practitioner: CARMEN Steve*  Date of Initial Visit: Type: THERAPY  Noted: 10/19/2020  Today's Date: 2020  Patient seen for 3 sessions      Subjective:   Patient reports: she is still unsure if she feels dizzy or off balance.   Pain: 0/10  Clinical Progress: improved  Home Program Compliance: Yes  Treatment has included: therapeutic exercise and neuromuscular re-education    Objective   See Exercise, Manual, and Modality Logs for complete treatment.    PT Neuro   Exercise 1  Exercise Name 1: Nu-Step   Equipment/Resistance 1: L5  Time: 6 min   Exercise 2  Exercise Name 2: review of HEP   Exercise 3  Exercise Name 3: airex standing with alternating step tap   Sets/Reps 3: > 20  Exercise 4  Exercise Name 4: airex standing with hold on step - horizontal and vertical head turns   Sets/Reps 4: 10 ea   Exercise 5  Exercise Name 5: forward stepping over HFR, followed by forward step onto TD   Sets/Reps 5: 20   Exercise 6  Exercise Name 6: TD step stone stepping progressing to forward lunge onto air disc followed by backward stepping stone steps   Sets/Reps 6: 10  Exercise 7  Exercise Name 7: forward and backward tandem walking on airex balance beam   Sets/Reps 7: 8  Exercise 8  Exercise Name 8: lateral stepping on airex balance beam   Sets/Reps 8: 20    Assessment & Plan     Assessment  Assessment details: Patient continues with increased imbalance on unstable surfaces, as well as with eyes closed. Vestibular habituation exercises were discontinued to see if change is evoked, however balance exercises will continue.     Plan  Plan details: Patient to continue with PT services to improve gait, balance, strength, transfers and overall functional mobility.          Timed:  Manual Therapy:            0     mins   29564;  Therapeutic Exercise:    8    mins  88941;     Neuromuscular Destinee:    30    mins  11250;    Therapeutic Activity:      0     mins  80812;     Gait Trainin    mins  95071;     Electrical Stimulation:    0    mins  75816 ( );     Untimed:  Canalith Repositioning techniques _0_ [id number]      Timed Treatment:   38   mins   Total Treatment:     38   mins      Audelia Mooney PT, DPT, MSCS, CDP  [place] License #: [id number]  Physical Therapist

## 2020-11-09 ENCOUNTER — TREATMENT (OUTPATIENT)
Dept: PHYSICAL THERAPY | Facility: CLINIC | Age: 80
End: 2020-11-09

## 2020-11-09 DIAGNOSIS — Z74.09 IMPAIRED FUNCTIONAL MOBILITY, BALANCE, GAIT, AND ENDURANCE: ICD-10-CM

## 2020-11-09 DIAGNOSIS — R42 VERTIGO: Primary | ICD-10-CM

## 2020-11-09 PROCEDURE — 97112 NEUROMUSCULAR REEDUCATION: CPT | Performed by: PHYSICAL THERAPIST

## 2020-11-09 PROCEDURE — 97110 THERAPEUTIC EXERCISES: CPT | Performed by: PHYSICAL THERAPIST

## 2020-11-09 NOTE — PROGRESS NOTES
Physical Therapy Daily Progress Note  Visit: 4  Date of Initial Visit: Type: THERAPY  Noted: 10/19/2020    Patient: Cecile Bennett   : 1940  Diagnosis/ICD-10 Code:  Vertigo [R42]  Referring practitioner: CARMEN Steve*  Date of Initial Visit: Type: THERAPY  Noted: 10/19/2020  Today's Date: 2020  Patient seen for 4 sessions      Subjective:   Patient reports: her MD changed her BP dosage medication   Pain: 0/10  Clinical Progress: improved  Home Program Compliance: Yes  Treatment has included: therapeutic exercise and neuromuscular re-education    Objective   See Exercise, Manual, and Modality Logs for complete treatment.    PT Neuro          Assessment & Plan     Assessment  Impairments: impaired balance  Assessment details: Patient has made improvements with static and dynamic balance over the last month, however her dizziness remains. She continues with habituation exercises, however they are unlikely helping at this point. Her dizziness is most likely originating from another source such as cardiac or medication. She is currently working with her MD on BP meds. It is concerning that she is dizzy and demonstrates a balance deficit. She will require continued skilled PT intervention to address static and dynamic balance.   Functional Limitations: carrying objects, lifting, walking, pulling, stooping and reaching overhead  Goals  Plan Goals: SHORT TERM GOALS: To be met in 4 weeks:  1. Patient is independent with HEP. MET   2. Patient will report at least 30% improvement in overall condition. ONGOING     LONG TERM GOALS:To be met in 8 weeks:  1. Patient will report decreased disequilibrium/dizziness by at least 90%. ONGOING  2. Patient will report no loss of balance with ADLs to demonstrate improved functional balance. ONGOING  3. Patient denies dizziness with daily activity. ONGOING       Plan  Therapy options: will be seen for skilled physical therapy services  Planned therapy  interventions: neuromuscular re-education, motor coordination training, postural training, stretching, strengthening, therapeutic activities, home exercise program, gait training and flexibility  Frequency: 1x week  Duration in visits: 2  Plan details: Patient to continue with PT services to improve gait, balance, strength, transfers and overall functional mobility for another 2 visits.          Timed:  Manual Therapy:          0      mins  55514;  Therapeutic Exercise:    15    mins  46139;     Neuromuscular Destinee:    25    mins  63216;    Therapeutic Activity:      0     mins  43676;     Gait Trainin    mins  05399;     Electrical Stimulation:    0    mins  48524 ( );     Untimed:  Canalith Repositioning techniques _0_ [id number]      Timed Treatment:   40   mins   Total Treatment:     40   mins      Audelia Mooney, PT, DPT, MSCS, CDP  [place] License #: [id number]  Physical Therapist

## 2020-11-16 ENCOUNTER — TREATMENT (OUTPATIENT)
Dept: PHYSICAL THERAPY | Facility: CLINIC | Age: 80
End: 2020-11-16

## 2020-11-16 DIAGNOSIS — Z74.09 IMPAIRED FUNCTIONAL MOBILITY, BALANCE, GAIT, AND ENDURANCE: ICD-10-CM

## 2020-11-16 DIAGNOSIS — R42 VERTIGO: Primary | ICD-10-CM

## 2020-11-16 PROCEDURE — 97110 THERAPEUTIC EXERCISES: CPT | Performed by: PHYSICAL THERAPIST

## 2020-11-16 PROCEDURE — 97112 NEUROMUSCULAR REEDUCATION: CPT | Performed by: PHYSICAL THERAPIST

## 2020-11-16 NOTE — PROGRESS NOTES
Physical Therapy Daily Progress Note  Visit: 5  Date of Initial Visit: Type: THERAPY  Noted: 10/19/2020    Patient: Cecile Bennett   : 1940  Diagnosis/ICD-10 Code:  Vertigo [R42]  Referring practitioner: CARMEN Steve*  Date of Initial Visit: Type: THERAPY  Noted: 10/19/2020  Today's Date: 2020  Patient seen for 5 sessions      Subjective:   Patient reports: she is doing ok.   Pain: 0/10  Clinical Progress: improved  Home Program Compliance: Yes  Treatment has included: therapeutic exercise and neuromuscular re-education    Objective   See Exercise, Manual, and Modality Logs for complete treatment.    PT Neuro   Exercise 1  Exercise Name 1: Nu-Step   Equipment/Resistance 1: L5  Time: 6 min   Exercise 2  Exercise Name 2: RB vertical standing with hold progressing to rocks   Sets/Reps 2: rocking 20 reps   Time 2: 2 min   Exercise 3  Exercise Name 3: RB horizontal standing with hold progressing to rocks   Sets/Reps 3: rocking 20 reps   Time 3: 2 min   Exercise 4  Exercise Name 4: tandem on HFR   Sets/Reps 4: 30 sec ea way   Exercise 5  Exercise Name 5: HFR sideways standing with lateral steps   Sets/Reps 5: 20   Exercise 6  Exercise Name 6: same as previous - cone taps   Sets/Reps 6: 20    Assessment & Plan     Assessment  Assessment details: Patient demonstrates good improvements with static and dynamic balance, particularly with unstable surfaces. She does improve with practice and only requires SBA-CGA.     Plan  Plan details: Patient to continue with PT services to improve gait, balance, strength, transfers and overall functional mobility. Update HEP next visit.           Timed:  Manual Therapy:            0     mins  47612;  Therapeutic Exercise:    8    mins  98676;     Neuromuscular Destinee:    30    mins  67085;    Therapeutic Activity:      0     mins  70277;     Gait Trainin    mins  87608;     Electrical Stimulation:    0    mins  90252 ( );      Untimed:  Canalith Repositioning techniques _0_ 68700      Timed Treatment:   38   mins   Total Treatment:     38   mins      Audelia Mooney PT, DPT, MSCS, CDP  [place] License #: [id number]  Physical Therapist

## 2020-11-23 ENCOUNTER — TREATMENT (OUTPATIENT)
Dept: PHYSICAL THERAPY | Facility: CLINIC | Age: 80
End: 2020-11-23

## 2020-11-23 DIAGNOSIS — R42 VERTIGO: Primary | ICD-10-CM

## 2020-11-23 DIAGNOSIS — Z74.09 IMPAIRED FUNCTIONAL MOBILITY, BALANCE, GAIT, AND ENDURANCE: ICD-10-CM

## 2020-11-23 PROCEDURE — 97110 THERAPEUTIC EXERCISES: CPT | Performed by: PHYSICAL THERAPIST

## 2020-11-23 PROCEDURE — 97112 NEUROMUSCULAR REEDUCATION: CPT | Performed by: PHYSICAL THERAPIST

## 2020-11-23 NOTE — PROGRESS NOTES
Physical Therapy Discharge Note  Visit: 6  Date of Initial Visit: Type: THERAPY  Noted: 10/19/2020    Patient: Cecile Bennett   : 1940  Diagnosis/ICD-10 Code:  Vertigo [R42]  Referring practitioner: CARMEN Steve*  Date of Initial Visit: Type: THERAPY  Noted: 10/19/2020  Today's Date: 2020  Patient seen for 6 sessions      Subjective:   Patient reports: she feels she will be able to manage her exercises at home well.   Pain: 0/10  Clinical Progress: improved  Home Program Compliance: Yes  Treatment has included: therapeutic exercise and neuromuscular re-education    Objective   See Exercise, Manual, and Modality Logs for complete treatment.    PT Neuro   Exercise 1  Exercise Name 1: Nu-Step   Equipment/Resistance 1: L5  Time: 8 min   Exercise 2  Exercise Name 2: review and progression of HEP - added walking with eyes closed and walking with vertical head turns   Time 2: 20 min   Exercise 3  Exercise Name 3: tandem walking on airex with step up onto step followed by cone tap -   Sets/Reps 3: 10 laps   Exercise 4  Exercise Name 4: tandem walking on HFR then stepping onto TD pads   Sets/Reps 4: 6 laps   Exercise 5  Exercise Name 5: SLS and tandem stance on firm surface.     Assessment & Plan     Assessment  Assessment details: Patient has met most goals and is actively attempting to fight cause of dizziness with her cardiologist. PT continues to believe it is a multifaceted issue that does have relations to vestibular system. She will continue to progress habituation by adding movement with current exercises. She will be discharged to independent management of program.     Goals  Plan Goals: SHORT TERM GOALS: To be met in 4 weeks:  1. Patient is independent with HEP. MET   2. Patient will report at least 30% improvement in overall condition. MET     LONG TERM GOALS:To be met in 8 weeks:  1. Patient will report decreased disequilibrium/dizziness by at least 90%. NOT MET  2. Patient will report  no loss of balance with ADLs to demonstrate improved functional balance. MET  3. Patient denies dizziness with daily activity. NOT MET        Plan  Plan details: Patient will be discharged to independent management with program.           Timed:  Manual Therapy:            0     mins  75496;  Therapeutic Exercise:    8    mins  38010;     Neuromuscular Destinee:    37    mins  24131;    Therapeutic Activity:      0     mins  07526;     Gait Trainin    mins  95583;     Electrical Stimulation:    0    mins  68335 ( );     Untimed:  Canalith Repositioning techniques _0_ 06221      Timed Treatment:   45   mins   Total Treatment:     45   mins      Audelia Mooney PT, DPT, MSCS, CDP  [place] License #: [id number]  Physical Therapist

## 2020-12-03 ENCOUNTER — OFFICE VISIT (OUTPATIENT)
Dept: NEUROLOGY | Facility: CLINIC | Age: 80
End: 2020-12-03

## 2020-12-03 ENCOUNTER — TELEPHONE (OUTPATIENT)
Dept: NEUROLOGY | Facility: CLINIC | Age: 80
End: 2020-12-03

## 2020-12-03 DIAGNOSIS — R20.2 NUMBNESS AND TINGLING OF BOTH FEET: ICD-10-CM

## 2020-12-03 DIAGNOSIS — R27.0 ATAXIA: ICD-10-CM

## 2020-12-03 DIAGNOSIS — R20.0 NUMBNESS AND TINGLING OF BOTH FEET: ICD-10-CM

## 2020-12-03 DIAGNOSIS — R42 DIZZINESS: Primary | ICD-10-CM

## 2020-12-03 PROCEDURE — 99441 PR PHYS/QHP TELEPHONE EVALUATION 5-10 MIN: CPT | Performed by: NURSE PRACTITIONER

## 2020-12-03 RX ORDER — METOPROLOL SUCCINATE 25 MG/1
25 TABLET, EXTENDED RELEASE ORAL EVERY EVENING
COMMUNITY
Start: 2020-11-25 | End: 2021-02-24 | Stop reason: SDUPTHER

## 2020-12-03 NOTE — TELEPHONE ENCOUNTER
----- Message from ADELE Steve sent at 12/3/2020 10:08 AM EST -----  Completed telephone visit. Please check in and out. She is going to follow up PRN. Thanks.

## 2020-12-03 NOTE — PROGRESS NOTES
Subjective:     Patient ID: Cecile Bennett is a 80 y.o. female.    CC:   Chief Complaint   Patient presents with   • Dizziness   • Gait Problem       HPI:   History of Present Illness   Due to COVID-19 Pandemic, this visit was completed via telephone with verbal consent to treat obtained from patient.      You have chosen to receive care through a telephone visit. Do you consent to use a telephone visit for your medical care today? Yes    This is a very pleasant 80-year-old female who presents to 2 month follow up on chronic daily dizziness present about 2.5+ years.  Since last visit she has completed several sessions of physical therapy and this did help with her balance overall. She has followed up with Cardiology and her blood pressure medication has been changed and lowered d/t some hypotension concerns with her chronic dizziness. She really feels like she is going well, she appreciates the thoroughness of her providers and neuro workup. She feels stable and like she can tolerate the daily dizziness if it remains the same. She denies any falls or new concerns. She feels like she is staying healthy and active. She tried low dose meclizine but this has no effect so she stopped it. Dizziness worse in mornings and better once up and moving.    9/19/2020-MRI of the brain which had to be changed from with and without contrast without contrast due to a metal allergy.  MRI of the brain without contrast imaging and radiology report reviewed last visit in office with the patient and I agree with the radiologist that there is mild age-related generalized cerebral atrophy with mild chronic small vessel ischemic changes with no acute intracranial abnormalities seen. She has also had some numbness and tingling in her great toes on both sides and feet and this is been for many years.  She tells me there is no pain.  We did order EMG and NCVS last time but she is decided not to go through with this.  She denies any neck or  back pain.  She did have labs since last visit including methylmalonic acid normal at 162, vitamin B12 539 and folate 9.5 both normal, , hemoglobin A1c 5.9%, sed rate 21, CRP less than 1.  She does have known carotid artery stenosis but cannot take aspirin & is not on statin therapy and follows with cardiology every 6 months.        Previous history and workup: She tells me she cannot recall the exact date of when her dizziness began but she knows it is been over 2-1/2 years.  She did have an MRI of the brain without contrast on 6/4/2013 for some left facial pain and headaches and she cannot remember if she was having dizziness at the time or not. She does have age-related general cerebral atrophy with no significant white matter changes and no acute intracranial abnormalities.  Radiologist also read as normal for age.  In June 2018 she went and saw Dr. Hugh Spicer with [facility] and he was unsure of the exact cause of her dizziness but have referred her to [facility] for tilt table test which the patient tells me was normal, for vestibular rehab which she tells me was not helpful, Epley maneuver which was not helpful and eventually she just did not return because she still no significant improvement.  She tells me she has tried meclizine and this did not help her symptoms at all.  She tells me she gets dizzy with position changes such as bending over or getting up from a chair. She is not on aspirin due to allergy.  She is not on a statin.     The following portions of the patient's history were reviewed and updated as appropriate: allergies, current medications, past family history, past medical history, past social history, past surgical history and problem list.    Past Medical History:   Diagnosis Date   • Breast cancer (CMS/HCC) 2009    RIGHT   • Hx of radiation therapy 2009    RT BREAST CANCER   • Osteoporosis        Past Surgical History:   Procedure Laterality Date   • BREAST  BIOPSY Right 2009    Stereotactic biopsy   • BREAST LUMPECTOMY Right 2009       Social History     Socioeconomic History   • Marital status:      Spouse name: Not on file   • Number of children: Not on file   • Years of education: Not on file   • Highest education level: Not on file   Tobacco Use   • Smoking status: Former Smoker     Quit date:      Years since quittin.9   • Smokeless tobacco: Never Used   Substance and Sexual Activity   • Alcohol use: Yes     Frequency: 2-4 times a month     Drinks per session: 1 or 2   • Drug use: Never   • Sexual activity: Not Currently       Family History   Problem Relation Age of Onset   • Breast cancer Sister 67   • Breast cancer Cousin 40   • Dementia Brother    • Pancreatic cancer Brother    • Stroke Maternal Grandmother    • Ovarian cancer Neg Hx         Review of Systems   Neurological: Positive for dizziness. Negative for headaches.   Psychiatric/Behavioral: Negative for agitation and confusion.   All other systems reviewed and are negative.       Objective:  There were no vitals taken for this visit.  Not obtained d/t telephone visit    Neurologic Exam     Mental Status   Oriented to person, place, and time.   Speech: speech is normal   Level of consciousness: alert      Physical Exam  Neurological:      Mental Status: She is oriented to person, place, and time.   Psychiatric:         Speech: Speech normal.     NEGAR fully d/t telephone visit    Assessment/Plan:       Diagnoses and all orders for this visit:    1. Dizziness (Primary)  Comments:  Cardiology just changed her blood pressure medication to metoprolol xl 25mg qhs. she follows w/ them closely. stable chronic dizziness, complete PT, no falls    2. Ataxia  Comments:  improved, completed PT, no falls. wishes to f/u with neuro PRN    3. Numbness and tingling of both feet  Comments:  chronic, no changes, wishes to monitor and f/u with neuro PRN           Total time of telephone visit 8 minutes.  Will  let her f/u PRN with neurology. Continue with cardiology, new medication and monitoring BP/HR at home. It has been an absolute pleasure providing care to you.  Reviewed medications, potential side effects and signs and symptoms to report. Discussed risk versus benefits of treatment plan with patient and/or family-including medications, labs and radiology that may be ordered. Addressed questions and concerns during visit. Patient and/or family verbalized understanding and agree with plan.    Other Reviewed [x]  ADELE Dennis cardiology notes in mychart from [facility]      ADELE Steve  12/3/2020

## 2021-01-04 ENCOUNTER — TRANSCRIBE ORDERS (OUTPATIENT)
Dept: ADMINISTRATIVE | Facility: HOSPITAL | Age: 81
End: 2021-01-04

## 2021-01-04 DIAGNOSIS — Z12.31 VISIT FOR SCREENING MAMMOGRAM: Primary | ICD-10-CM

## 2021-02-24 ENCOUNTER — OFFICE VISIT (OUTPATIENT)
Dept: INTERNAL MEDICINE | Facility: CLINIC | Age: 81
End: 2021-02-24

## 2021-02-24 VITALS
SYSTOLIC BLOOD PRESSURE: 140 MMHG | TEMPERATURE: 97.1 F | HEART RATE: 68 BPM | WEIGHT: 113 LBS | DIASTOLIC BLOOD PRESSURE: 70 MMHG | HEIGHT: 60 IN | BODY MASS INDEX: 22.19 KG/M2

## 2021-02-24 DIAGNOSIS — I10 ESSENTIAL HYPERTENSION: ICD-10-CM

## 2021-02-24 DIAGNOSIS — M81.0 OSTEOPOROSIS, UNSPECIFIED OSTEOPOROSIS TYPE, UNSPECIFIED PATHOLOGICAL FRACTURE PRESENCE: ICD-10-CM

## 2021-02-24 DIAGNOSIS — J30.1 SEASONAL ALLERGIC RHINITIS DUE TO POLLEN: ICD-10-CM

## 2021-02-24 DIAGNOSIS — K27.9 PUD (PEPTIC ULCER DISEASE): Primary | ICD-10-CM

## 2021-02-24 PROCEDURE — 99203 OFFICE O/P NEW LOW 30 MIN: CPT | Performed by: INTERNAL MEDICINE

## 2021-02-24 RX ORDER — METOPROLOL SUCCINATE 25 MG/1
25 TABLET, EXTENDED RELEASE ORAL NIGHTLY
Qty: 90 TABLET | Refills: 3 | Status: SHIPPED | OUTPATIENT
Start: 2021-02-24 | End: 2022-03-01 | Stop reason: SDUPTHER

## 2021-02-24 NOTE — PROGRESS NOTES
Patient is a 80 y.o. female who is here to establish care for heart murmur and discuss medication.  Chief Complaint   Patient presents with   • Heart Murmur         HPI:    Here for evaluation of HTN and PUD.  Was followed by Dr Bass.  Rare Dizziness.  No HAs.  No abdominal pain.  No SOB or palpitations.  Weight is about the same.  Energy level is good.  Had second Covid vaccine yesterday.      History:     Patient Active Problem List   Diagnosis   • Ataxia   • Dizziness   • Numbness and tingling of both feet   • PUD (peptic ulcer disease)   • Seasonal allergic rhinitis due to pollen   • Osteoporosis   • Essential hypertension       Past Medical History:   Diagnosis Date   • Breast cancer (CMS/HCC) 2009    RIGHT   • Hx of radiation therapy 2009    RT BREAST CANCER   • Osteoporosis        Past Surgical History:   Procedure Laterality Date   • BREAST BIOPSY Right 2009    Stereotactic biopsy   • BREAST LUMPECTOMY Right 2009       Current Outpatient Medications on File Prior to Visit   Medication Sig   • Calcium Carb-Cholecalciferol (CALCIUM 1000 + D PO) Take  by mouth.   • loratadine (CLARITIN) 10 MG tablet Take  by mouth Daily.   • omeprazole (priLOSEC) 20 MG capsule    • [DISCONTINUED] metoprolol succinate XL (TOPROL-XL) 25 MG 24 hr tablet Take 25 mg by mouth Every Evening.     No current facility-administered medications on file prior to visit.        Family History   Problem Relation Age of Onset   • Breast cancer Sister 67   • Breast cancer Cousin 40   • Dementia Brother    • Pancreatic cancer Brother    • Stroke Maternal Grandmother    • Ovarian cancer Neg Hx        Social History     Socioeconomic History   • Marital status:      Spouse name: Not on file   • Number of children: Not on file   • Years of education: Not on file   • Highest education level: Not on file   Tobacco Use   • Smoking status: Former Smoker     Quit date:      Years since quittin.1   • Smokeless tobacco: Never Used  "  Substance and Sexual Activity   • Alcohol use: Yes     Frequency: 2-4 times a month     Drinks per session: 1 or 2   • Drug use: Never   • Sexual activity: Not Currently         Review of Systems   Constitutional: Negative for chills, fatigue, fever and unexpected weight change.   HENT: Negative for congestion, ear pain, hearing loss, rhinorrhea, sinus pressure, sore throat and trouble swallowing.    Eyes: Negative for discharge and itching.   Respiratory: Negative for cough, chest tightness and shortness of breath.    Cardiovascular: Negative for chest pain, palpitations and leg swelling.   Gastrointestinal: Negative for abdominal pain, blood in stool, constipation, diarrhea and vomiting.   Endocrine: Negative for polydipsia and polyuria.   Genitourinary: Negative for difficulty urinating, dysuria, enuresis, frequency, hematuria and urgency.   Musculoskeletal: Negative for arthralgias, back pain, gait problem and joint swelling.        Left bunion   Skin: Negative for rash and wound.   Allergic/Immunologic: Negative for immunocompromised state.   Neurological: Negative for dizziness, syncope, weakness, light-headedness, numbness and headaches.   Hematological: Does not bruise/bleed easily.   Psychiatric/Behavioral: Negative for behavioral problems, dysphoric mood and sleep disturbance. The patient is not nervous/anxious.        /70   Pulse 68   Temp 97.1 °F (36.2 °C) (Infrared)   Ht 152.4 cm (60\")   Wt 51.3 kg (113 lb)   BMI 22.07 kg/m²       Physical Exam  Constitutional:       Appearance: Normal appearance. She is well-developed.   HENT:      Head: Normocephalic and atraumatic.      Right Ear: External ear normal.      Left Ear: External ear normal.      Nose: Nose normal.      Mouth/Throat:      Mouth: Mucous membranes are moist.      Pharynx: Oropharynx is clear.   Eyes:      Extraocular Movements: Extraocular movements intact.      Conjunctiva/sclera: Conjunctivae normal.      Pupils: Pupils are " equal, round, and reactive to light.   Neck:      Musculoskeletal: Normal range of motion and neck supple.   Cardiovascular:      Rate and Rhythm: Normal rate and regular rhythm.      Heart sounds: Normal heart sounds.   Pulmonary:      Effort: Pulmonary effort is normal.      Breath sounds: Normal breath sounds.   Abdominal:      General: Bowel sounds are normal.      Palpations: Abdomen is soft.   Musculoskeletal: Normal range of motion.      Left lower leg: Edema present.   Lymphadenopathy:      Cervical: No cervical adenopathy.   Skin:     General: Skin is warm and dry.   Neurological:      General: No focal deficit present.      Mental Status: She is alert and oriented to person, place, and time.   Psychiatric:         Mood and Affect: Mood normal.         Behavior: Behavior normal.         Thought Content: Thought content normal.         Procedure:      Discussion/Summary:    HTN-cont BB, advised goal of 130/80  PUD-controlled on PPI  Rhinitis-cont claritin  Osteoporosis-check vit D on rtc,  Advised weight bearing exercise and at least vit D 2000 IU qd    Prior records reviewed    Current Outpatient Medications:   •  Calcium Carb-Cholecalciferol (CALCIUM 1000 + D PO), Take  by mouth., Disp: , Rfl:   •  loratadine (CLARITIN) 10 MG tablet, Take  by mouth Daily., Disp: , Rfl:   •  metoprolol succinate XL (TOPROL-XL) 25 MG 24 hr tablet, Take 1 tablet by mouth Every Night., Disp: 90 tablet, Rfl: 3  •  omeprazole (priLOSEC) 20 MG capsule, , Disp: , Rfl:         Diagnoses and all orders for this visit:    1. PUD (peptic ulcer disease) (Primary)    2. Essential hypertension    3. Seasonal allergic rhinitis due to pollen    4. Osteoporosis, unspecified osteoporosis type, unspecified pathological fracture presence    Other orders  -     metoprolol succinate XL (TOPROL-XL) 25 MG 24 hr tablet; Take 1 tablet by mouth Every Night.  Dispense: 90 tablet; Refill: 3

## 2021-06-16 ENCOUNTER — TELEPHONE (OUTPATIENT)
Dept: INTERNAL MEDICINE | Facility: CLINIC | Age: 81
End: 2021-06-16

## 2021-06-30 RX ORDER — OMEPRAZOLE 20 MG/1
20 CAPSULE, DELAYED RELEASE ORAL EVERY MORNING
Qty: 30 CAPSULE | Refills: 2 | Status: SHIPPED | OUTPATIENT
Start: 2021-06-30 | End: 2021-10-26 | Stop reason: SDUPTHER

## 2021-06-30 RX ORDER — OMEPRAZOLE 20 MG/1
CAPSULE, DELAYED RELEASE ORAL
Status: CANCELLED | OUTPATIENT
Start: 2021-06-30

## 2021-07-14 ENCOUNTER — OFFICE VISIT (OUTPATIENT)
Dept: INTERNAL MEDICINE | Facility: CLINIC | Age: 81
End: 2021-07-14

## 2021-07-14 ENCOUNTER — LAB (OUTPATIENT)
Dept: LAB | Facility: HOSPITAL | Age: 81
End: 2021-07-14

## 2021-07-14 VITALS
WEIGHT: 118 LBS | RESPIRATION RATE: 16 BRPM | HEIGHT: 60 IN | OXYGEN SATURATION: 98 % | SYSTOLIC BLOOD PRESSURE: 136 MMHG | BODY MASS INDEX: 23.16 KG/M2 | TEMPERATURE: 96.9 F | HEART RATE: 55 BPM | DIASTOLIC BLOOD PRESSURE: 68 MMHG

## 2021-07-14 DIAGNOSIS — Z13.29 THYROID DISORDER SCREENING: ICD-10-CM

## 2021-07-14 DIAGNOSIS — I10 ESSENTIAL HYPERTENSION: ICD-10-CM

## 2021-07-14 DIAGNOSIS — R73.03 BORDERLINE DIABETES: ICD-10-CM

## 2021-07-14 DIAGNOSIS — Z13.21 ENCOUNTER FOR VITAMIN DEFICIENCY SCREENING: ICD-10-CM

## 2021-07-14 DIAGNOSIS — Z11.59 ENCOUNTER FOR HEPATITIS C SCREENING TEST FOR LOW RISK PATIENT: ICD-10-CM

## 2021-07-14 DIAGNOSIS — Z13.0 SCREENING FOR BLOOD DISEASE: ICD-10-CM

## 2021-07-14 DIAGNOSIS — Z13.220 LIPID SCREENING: ICD-10-CM

## 2021-07-14 DIAGNOSIS — Z00.00 MEDICARE ANNUAL WELLNESS VISIT, SUBSEQUENT: Primary | ICD-10-CM

## 2021-07-14 LAB
ALBUMIN SERPL-MCNC: 4 G/DL (ref 3.5–5.2)
ALBUMIN/GLOB SERPL: 1.5 G/DL
ALP SERPL-CCNC: 79 U/L (ref 39–117)
ALT SERPL W P-5'-P-CCNC: 20 U/L (ref 1–33)
ANION GAP SERPL CALCULATED.3IONS-SCNC: 10.1 MMOL/L (ref 5–15)
AST SERPL-CCNC: 34 U/L (ref 1–32)
BILIRUB SERPL-MCNC: 0.5 MG/DL (ref 0–1.2)
BUN SERPL-MCNC: 12 MG/DL (ref 8–23)
BUN/CREAT SERPL: 13.6 (ref 7–25)
CALCIUM SPEC-SCNC: 9.3 MG/DL (ref 8.6–10.5)
CHLORIDE SERPL-SCNC: 103 MMOL/L (ref 98–107)
CHOLEST SERPL-MCNC: 184 MG/DL (ref 0–200)
CO2 SERPL-SCNC: 26.9 MMOL/L (ref 22–29)
CREAT SERPL-MCNC: 0.88 MG/DL (ref 0.57–1)
DEPRECATED RDW RBC AUTO: 44.9 FL (ref 37–54)
ERYTHROCYTE [DISTWIDTH] IN BLOOD BY AUTOMATED COUNT: 15.4 % (ref 12.3–15.4)
GFR SERPL CREATININE-BSD FRML MDRD: 62 ML/MIN/1.73
GLOBULIN UR ELPH-MCNC: 2.7 GM/DL
GLUCOSE SERPL-MCNC: 77 MG/DL (ref 65–99)
HCT VFR BLD AUTO: 35.4 % (ref 34–46.6)
HDLC SERPL-MCNC: 83 MG/DL (ref 40–60)
HGB BLD-MCNC: 11 G/DL (ref 12–15.9)
LDLC SERPL CALC-MCNC: 89 MG/DL (ref 0–100)
LDLC/HDLC SERPL: 1.06 {RATIO}
MCH RBC QN AUTO: 25 PG (ref 26.6–33)
MCHC RBC AUTO-ENTMCNC: 31.1 G/DL (ref 31.5–35.7)
MCV RBC AUTO: 80.5 FL (ref 79–97)
PLATELET # BLD AUTO: 210 10*3/MM3 (ref 140–450)
PMV BLD AUTO: 12.3 FL (ref 6–12)
POTASSIUM SERPL-SCNC: 4.3 MMOL/L (ref 3.5–5.2)
PROT SERPL-MCNC: 6.7 G/DL (ref 6–8.5)
RBC # BLD AUTO: 4.4 10*6/MM3 (ref 3.77–5.28)
SODIUM SERPL-SCNC: 140 MMOL/L (ref 136–145)
TRIGL SERPL-MCNC: 65 MG/DL (ref 0–150)
TSH SERPL DL<=0.05 MIU/L-ACNC: 2.87 UIU/ML (ref 0.27–4.2)
VLDLC SERPL-MCNC: 12 MG/DL (ref 5–40)
WBC # BLD AUTO: 7.28 10*3/MM3 (ref 3.4–10.8)

## 2021-07-14 PROCEDURE — G0439 PPPS, SUBSEQ VISIT: HCPCS | Performed by: NURSE PRACTITIONER

## 2021-07-14 PROCEDURE — 83036 HEMOGLOBIN GLYCOSYLATED A1C: CPT | Performed by: NURSE PRACTITIONER

## 2021-07-14 PROCEDURE — 86803 HEPATITIS C AB TEST: CPT | Performed by: NURSE PRACTITIONER

## 2021-07-14 PROCEDURE — 84443 ASSAY THYROID STIM HORMONE: CPT | Performed by: NURSE PRACTITIONER

## 2021-07-14 PROCEDURE — 80053 COMPREHEN METABOLIC PANEL: CPT | Performed by: NURSE PRACTITIONER

## 2021-07-14 PROCEDURE — 80061 LIPID PANEL: CPT | Performed by: NURSE PRACTITIONER

## 2021-07-14 PROCEDURE — 82746 ASSAY OF FOLIC ACID SERUM: CPT | Performed by: NURSE PRACTITIONER

## 2021-07-14 PROCEDURE — 82607 VITAMIN B-12: CPT | Performed by: NURSE PRACTITIONER

## 2021-07-14 PROCEDURE — 85027 COMPLETE CBC AUTOMATED: CPT | Performed by: NURSE PRACTITIONER

## 2021-07-15 LAB
FOLATE SERPL-MCNC: 9.06 NG/ML (ref 4.78–24.2)
HBA1C MFR BLD: 6.11 % (ref 4.8–5.6)
HCV AB SER DONR QL: NORMAL
VIT B12 BLD-MCNC: 592 PG/ML (ref 211–946)

## 2021-08-03 ENCOUNTER — TELEPHONE (OUTPATIENT)
Dept: INTERNAL MEDICINE | Facility: CLINIC | Age: 81
End: 2021-08-03

## 2021-08-03 NOTE — TELEPHONE ENCOUNTER
Do you know if she can get an insurance referral.. she stated he does not participate in [employer] but if we get a referal he will accept it.  She has always seen him

## 2021-08-03 NOTE — TELEPHONE ENCOUNTER
Patient called stating she needs a referral to see an opthalmologist by the name of [address] [place] [id number]

## 2021-08-04 DIAGNOSIS — H26.8 OTHER CATARACT, UNSPECIFIED LATERALITY: Primary | ICD-10-CM

## 2021-08-09 ENCOUNTER — HOSPITAL ENCOUNTER (OUTPATIENT)
Dept: MAMMOGRAPHY | Facility: HOSPITAL | Age: 81
Discharge: HOME | End: 2021-08-09
Admitting: INTERNAL MEDICINE

## 2021-08-09 DIAGNOSIS — Z12.31 VISIT FOR SCREENING MAMMOGRAM: ICD-10-CM

## 2021-08-09 PROCEDURE — 77067 SCR MAMMO BI INCL CAD: CPT

## 2021-08-09 PROCEDURE — 77067 SCR MAMMO BI INCL CAD: CPT | Performed by: RADIOLOGY

## 2021-08-09 PROCEDURE — 77063 BREAST TOMOSYNTHESIS BI: CPT | Performed by: RADIOLOGY

## 2021-08-09 PROCEDURE — 77063 BREAST TOMOSYNTHESIS BI: CPT

## 2021-08-18 RX ORDER — FERROUS SULFATE 325(65) MG
325 TABLET ORAL
Qty: 30 TABLET | Refills: 2 | Status: SHIPPED | OUTPATIENT
Start: 2021-08-18 | End: 2021-11-22

## 2021-08-18 NOTE — PROGRESS NOTES
Your labs show you are anemic. Make sure to eat a high iron diet. I am calling in a daily iron supplement for you. Try to take this daily. If it upsets your stomach or causes diarrhea try to take this about 3 times a week. You need labs repeated in a couple of months.

## 2021-08-19 ENCOUNTER — TELEPHONE (OUTPATIENT)
Dept: INTERNAL MEDICINE | Facility: CLINIC | Age: 81
End: 2021-08-19

## 2021-08-19 NOTE — TELEPHONE ENCOUNTER
Caller: Cecile Bennett    Relationship: Self    Best call back number: [phone]    Caller requesting test results: CECILE    What test was performed:LABS     When was the test performed: 07/14/21    Where was the test performed: OFFICE     Additional notes: CECILE WOULD LIKE A REPORT MAILED TO HER HOME.

## 2021-09-02 ENCOUNTER — TELEPHONE (OUTPATIENT)
Dept: INTERNAL MEDICINE | Facility: CLINIC | Age: 81
End: 2021-09-02

## 2021-09-02 NOTE — TELEPHONE ENCOUNTER
Caller: Cecile Bennett    Relationship: Self    Best call back number: [phone]    Who are you requesting to speak with (clinical staff, provider,  specific staff member): CLINICAL STAFF    What was the call regarding: PATIENT STATED THE IRON SUPPLEMENT SHE IS TAKING IS CAUSING HER TO HAVE EXCESSIVE GAS. PATIENT WOULD LIKE TO KNOW IF DR. JUAN THINKS IT WOULD BE OKAY FOR HER TO TAKE SIMETHICONE SOFT GELS TO HELP WITH THIS.    Do you require a callback: YES

## 2021-09-14 ENCOUNTER — HOSPITAL ENCOUNTER (OUTPATIENT)
Dept: MAMMOGRAPHY | Facility: HOSPITAL | Age: 81
Discharge: HOME | End: 2021-09-14
Admitting: INTERNAL MEDICINE

## 2021-09-14 DIAGNOSIS — R92.8 ABNORMAL MAMMOGRAM: ICD-10-CM

## 2021-09-14 PROCEDURE — G0279 TOMOSYNTHESIS, MAMMO: HCPCS

## 2021-09-14 PROCEDURE — 77065 DX MAMMO INCL CAD UNI: CPT

## 2021-09-14 PROCEDURE — 77065 DX MAMMO INCL CAD UNI: CPT | Performed by: RADIOLOGY

## 2021-09-14 PROCEDURE — G0279 TOMOSYNTHESIS, MAMMO: HCPCS | Performed by: RADIOLOGY

## 2021-09-15 ENCOUNTER — TRANSCRIBE ORDERS (OUTPATIENT)
Dept: ADMINISTRATIVE | Facility: HOSPITAL | Age: 81
End: 2021-09-15

## 2021-09-15 DIAGNOSIS — Z12.31 VISIT FOR SCREENING MAMMOGRAM: Primary | ICD-10-CM

## 2021-10-26 ENCOUNTER — TELEPHONE (OUTPATIENT)
Dept: INTERNAL MEDICINE | Facility: CLINIC | Age: 81
End: 2021-10-26

## 2021-10-26 RX ORDER — OMEPRAZOLE 20 MG/1
20 CAPSULE, DELAYED RELEASE ORAL EVERY MORNING
Qty: 90 CAPSULE | Refills: 2 | Status: SHIPPED | OUTPATIENT
Start: 2021-10-26 | End: 2022-10-12

## 2021-10-26 NOTE — TELEPHONE ENCOUNTER
Caller: Cecile Bennett    Relationship: Self      Medication requested (name and dosage):     Requested Prescriptions:   Requested Prescriptions     Pending Prescriptions Disp Refills   • omeprazole (priLOSEC) 20 MG capsule 30 capsule 2     Sig: Take 1 capsule by mouth Every Morning.        Pharmacy where request should be sent: [id number]    Additional details provided by patient: HAS 7 TABLETS LEFT    Best call back number: [phone]    Does the patient have less than a 3 day supply:  [] Yes  [x] No    Lela Stallings Rep   10/26/21 09:00 EDT

## 2021-11-22 RX ORDER — PNV NO.95/FERROUS FUM/FOLIC AC 28MG-0.8MG
TABLET ORAL
Qty: 30 TABLET | Refills: 0 | Status: SHIPPED | OUTPATIENT
Start: 2021-11-22 | End: 2021-12-09

## 2021-11-22 NOTE — TELEPHONE ENCOUNTER
Caller: Cecile Bennett    Relationship: Self    Best call back number: [phone]    Requested Prescriptions:   Requested Prescriptions     Pending Prescriptions Disp Refills   • ferrous sulfate 325 (65 Fe) MG tablet [Pharmacy Med Name: Iron 325 (65 Fe) MG Oral Tablet] 30 tablet 0     Sig: TAKE 1 TABLET BY MOUTH ONCE DAILY WITH BREAKFAST. TAKE WITH ORANGE JUICE OR VITAMIN C        Pharmacy where request should be sent: [address] [phone] [place] [phone] [place]     Does the patient have less than a 3 day supply:  [x] Yes  [] No    Lela Meza Rep   11/22/21 09:54 EST

## 2021-12-03 ENCOUNTER — OFFICE VISIT (OUTPATIENT)
Dept: INTERNAL MEDICINE | Facility: CLINIC | Age: 81
End: 2021-12-03

## 2021-12-03 VITALS
OXYGEN SATURATION: 94 % | HEART RATE: 68 BPM | DIASTOLIC BLOOD PRESSURE: 70 MMHG | HEIGHT: 60 IN | BODY MASS INDEX: 23.05 KG/M2 | SYSTOLIC BLOOD PRESSURE: 130 MMHG | TEMPERATURE: 96.9 F

## 2021-12-03 DIAGNOSIS — K27.9 PUD (PEPTIC ULCER DISEASE): ICD-10-CM

## 2021-12-03 DIAGNOSIS — I10 ESSENTIAL HYPERTENSION: Primary | ICD-10-CM

## 2021-12-03 DIAGNOSIS — J02.8 PHARYNGITIS DUE TO OTHER ORGANISM: ICD-10-CM

## 2021-12-03 DIAGNOSIS — M81.0 OSTEOPOROSIS, UNSPECIFIED OSTEOPOROSIS TYPE, UNSPECIFIED PATHOLOGICAL FRACTURE PRESENCE: ICD-10-CM

## 2021-12-03 PROBLEM — J02.9 PHARYNGITIS: Status: ACTIVE | Noted: 2021-12-03

## 2021-12-03 LAB
EXPIRATION DATE: ABNORMAL
INTERNAL CONTROL: ABNORMAL
Lab: ABNORMAL
S PYO AG THROAT QL: POSITIVE

## 2021-12-03 PROCEDURE — U0004 COV-19 TEST NON-CDC HGH THRU: HCPCS | Performed by: INTERNAL MEDICINE

## 2021-12-03 PROCEDURE — 87880 STREP A ASSAY W/OPTIC: CPT | Performed by: INTERNAL MEDICINE

## 2021-12-03 PROCEDURE — 99214 OFFICE O/P EST MOD 30 MIN: CPT | Performed by: INTERNAL MEDICINE

## 2021-12-03 RX ORDER — AMOXICILLIN 875 MG/1
875 TABLET, COATED ORAL EVERY 12 HOURS SCHEDULED
Qty: 20 TABLET | Refills: 0 | Status: SHIPPED | OUTPATIENT
Start: 2021-12-03 | End: 2021-12-03

## 2021-12-03 RX ORDER — CEPHALEXIN 500 MG/1
500 CAPSULE ORAL 2 TIMES DAILY
Qty: 20 CAPSULE | Refills: 0 | Status: SHIPPED | OUTPATIENT
Start: 2021-12-03 | End: 2022-04-05

## 2021-12-03 NOTE — PROGRESS NOTES
Patient is a 81 y.o. female who is here for a sore throat and exposed to COVID  Chief Complaint   Patient presents with   • Sore Throat         HPI:    Here for evaluation of sore throat.  Onset about 2-3 days ago.  No fever or chills.  Has a mild cough.  Using warm salt water.  No SOB.   Had a HA but better.    No abdominal pains.  No dizziness or lightheadedness.      History:     Patient Active Problem List   Diagnosis   • Ataxia   • Dizziness   • Numbness and tingling of both feet   • PUD (peptic ulcer disease)   • Seasonal allergic rhinitis due to pollen   • Osteoporosis   • Essential hypertension   • Pharyngitis       Past Medical History:   Diagnosis Date   • Breast cancer (HCC) 2009    RIGHT   • Hx of radiation therapy 2009    RT BREAST CANCER   • Osteoporosis        Past Surgical History:   Procedure Laterality Date   • BREAST BIOPSY Right     Stereotactic biopsy   • BREAST LUMPECTOMY Right        Current Outpatient Medications on File Prior to Visit   Medication Sig   • Calcium Carb-Cholecalciferol (CALCIUM 1000 + D PO) Take  by mouth.   • ferrous sulfate 325 (65 Fe) MG tablet TAKE 1 TABLET BY MOUTH ONCE DAILY WITH BREAKFAST. TAKE WITH ORANGE JUICE OR VITAMIN C   • loratadine (CLARITIN) 10 MG tablet Take  by mouth Daily.   • metoprolol succinate XL (TOPROL-XL) 25 MG 24 hr tablet Take 1 tablet by mouth Every Night.   • omeprazole (priLOSEC) 20 MG capsule Take 1 capsule by mouth Every Morning.     No current facility-administered medications on file prior to visit.       Family History   Problem Relation Age of Onset   • Breast cancer Sister 67   • Breast cancer Cousin 40   • Dementia Brother    • Pancreatic cancer Brother    • Stroke Maternal Grandmother    • Ovarian cancer Neg Hx        Social History     Socioeconomic History   • Marital status:    Tobacco Use   • Smoking status: Former Smoker     Quit date:      Years since quittin.9   • Smokeless tobacco: Never Used   Substance  "and Sexual Activity   • Alcohol use: Yes   • Drug use: Never   • Sexual activity: Not Currently         Review of Systems   Constitutional: Negative for chills, fatigue, fever and unexpected weight change.   HENT: Positive for sore throat. Negative for congestion, ear pain, hearing loss, rhinorrhea, sinus pressure and trouble swallowing.    Eyes: Negative for discharge and itching.   Respiratory: Positive for cough. Negative for chest tightness and shortness of breath.    Cardiovascular: Negative for chest pain, palpitations and leg swelling.   Gastrointestinal: Negative for abdominal pain, blood in stool, constipation, diarrhea and vomiting.   Endocrine: Negative for polydipsia and polyuria.   Genitourinary: Negative for difficulty urinating, dysuria, enuresis, frequency, hematuria and urgency.   Musculoskeletal: Negative for arthralgias, back pain, gait problem and joint swelling.        Left bunion   Skin: Negative for rash and wound.   Allergic/Immunologic: Negative for immunocompromised state.   Neurological: Negative for dizziness, syncope, weakness, light-headedness, numbness and headaches.   Hematological: Does not bruise/bleed easily.   Psychiatric/Behavioral: Negative for behavioral problems, dysphoric mood and sleep disturbance. The patient is not nervous/anxious.        /70   Pulse 68   Temp 96.9 °F (36.1 °C) (Infrared)   Ht 152.4 cm (60\")   SpO2 94%   BMI 23.05 kg/m²       Physical Exam  Constitutional:       Appearance: Normal appearance. She is well-developed.   HENT:      Head: Normocephalic and atraumatic.      Comments: Posterior erythema     Right Ear: External ear normal.      Left Ear: External ear normal.      Nose: Nose normal.      Mouth/Throat:      Mouth: Mucous membranes are moist.      Pharynx: Oropharynx is clear.   Eyes:      Extraocular Movements: Extraocular movements intact.      Conjunctiva/sclera: Conjunctivae normal.      Pupils: Pupils are equal, round, and reactive to " light.   Cardiovascular:      Rate and Rhythm: Normal rate and regular rhythm.      Heart sounds: Normal heart sounds.   Pulmonary:      Effort: Pulmonary effort is normal.      Breath sounds: Normal breath sounds.   Abdominal:      General: Bowel sounds are normal.      Palpations: Abdomen is soft.   Musculoskeletal:         General: Normal range of motion.      Cervical back: Normal range of motion and neck supple.      Left lower leg: Edema present.   Lymphadenopathy:      Cervical: No cervical adenopathy.   Skin:     General: Skin is warm and dry.   Neurological:      General: No focal deficit present.      Mental Status: She is alert and oriented to person, place, and time.   Psychiatric:         Mood and Affect: Mood normal.         Behavior: Behavior normal.         Thought Content: Thought content normal.         Procedure:      Discussion/Summary:    HTN-cont BB, advised goal of 130/80  PUD-controlled on PPI  Rhinitis-cont claritin  Osteoporosis-check vit D on rtc,  Advised weight bearing exercise and at least vit D 2000 IU qd  Pharyngitis-w/u pending, will rx keflex     Prior records reviewed    Current Outpatient Medications:   •  Calcium Carb-Cholecalciferol (CALCIUM 1000 + D PO), Take  by mouth., Disp: , Rfl:   •  ferrous sulfate 325 (65 Fe) MG tablet, TAKE 1 TABLET BY MOUTH ONCE DAILY WITH BREAKFAST. TAKE WITH ORANGE JUICE OR VITAMIN C, Disp: 30 tablet, Rfl: 0  •  loratadine (CLARITIN) 10 MG tablet, Take  by mouth Daily., Disp: , Rfl:   •  metoprolol succinate XL (TOPROL-XL) 25 MG 24 hr tablet, Take 1 tablet by mouth Every Night., Disp: 90 tablet, Rfl: 3  •  omeprazole (priLOSEC) 20 MG capsule, Take 1 capsule by mouth Every Morning., Disp: 90 capsule, Rfl: 2  •  cephalexin (Keflex) 500 MG capsule, Take 1 capsule by mouth 2 (Two) Times a Day., Disp: 20 capsule, Rfl: 0        Diagnoses and all orders for this visit:    1. Essential hypertension (Primary)    2. PUD (peptic ulcer disease)    3.  Osteoporosis, unspecified osteoporosis type, unspecified pathological fracture presence    4. Pharyngitis due to other organism  -     POC Rapid Strep A  -     COVID-19 PCR, tic LABS, NP SWAB IN tic VIRAL TRANSPORT MEDIA/ORAL SWISH 24-30 HR TAT - Swab, Nasopharynx  -     Discontinue: amoxicillin (AMOXIL) 875 MG tablet; Take 1 tablet by mouth Every 12 (Twelve) Hours for 10 days.  Dispense: 20 tablet; Refill: 0    Other orders  -     cephalexin (Keflex) 500 MG capsule; Take 1 capsule by mouth 2 (Two) Times a Day.  Dispense: 20 capsule; Refill: 0

## 2021-12-04 LAB — SARS-COV-2 RNA NOSE QL NAA+PROBE: NOT DETECTED

## 2021-12-09 RX ORDER — PNV NO.95/FERROUS FUM/FOLIC AC 28MG-0.8MG
TABLET ORAL
Qty: 30 TABLET | Refills: 0 | Status: SHIPPED | OUTPATIENT
Start: 2021-12-09 | End: 2022-01-17 | Stop reason: SDUPTHER

## 2022-01-17 RX ORDER — PNV NO.95/FERROUS FUM/FOLIC AC 28MG-0.8MG
1 TABLET ORAL
Qty: 30 TABLET | Refills: 0 | Status: SHIPPED | OUTPATIENT
Start: 2022-01-17 | End: 2022-02-14 | Stop reason: SDUPTHER

## 2022-01-17 NOTE — TELEPHONE ENCOUNTER
Caller: Bennett Cecile Mona    Relationship: Self    Best call back number: [phone]    Requested Prescriptions:   Requested Prescriptions     Pending Prescriptions Disp Refills   • ferrous sulfate 325 (65 Fe) MG tablet 30 tablet 0        Pharmacy where request should be sent: [facility] [address] [place] [address] [phone] [facility] [phone] FX     Additional details provided by patient: patient has 2 left    Does the patient have less than a 3 day supply:  [x] Yes  [] No    Lela Celestin Rep   01/17/22 10:07 EST

## 2022-02-14 RX ORDER — PNV NO.95/FERROUS FUM/FOLIC AC 28MG-0.8MG
1 TABLET ORAL
Qty: 30 TABLET | Refills: 5 | Status: SHIPPED | OUTPATIENT
Start: 2022-02-14 | End: 2022-04-18

## 2022-02-14 NOTE — TELEPHONE ENCOUNTER
Caller: Bennett Cecile Mona    Relationship: Self    Best call back number: [phone]    Requested Prescriptions:   Requested Prescriptions     Pending Prescriptions Disp Refills   • ferrous sulfate 325 (65 Fe) MG tablet 30 tablet 0     Sig: Take 1 tablet by mouth Daily With Breakfast.        Pharmacy where request should be sent: [facility] [address] [place] [address] [phone] [facility] [phone] FX     Additional details provided by patient: PATIENT HAS 5 LEFT     Does the patient have less than a 3 day supply:  [] Yes  [x] No    Lela Cooper Rep   02/14/22 08:37 EST

## 2022-02-24 ENCOUNTER — TRANSCRIBE ORDERS (OUTPATIENT)
Dept: ADMINISTRATIVE | Facility: HOSPITAL | Age: 82
End: 2022-02-24

## 2022-02-24 DIAGNOSIS — N64.52 NIPPLE DISCHARGE: Primary | ICD-10-CM

## 2022-03-01 ENCOUNTER — TELEPHONE (OUTPATIENT)
Dept: INTERNAL MEDICINE | Facility: CLINIC | Age: 82
End: 2022-03-01

## 2022-03-01 RX ORDER — METOPROLOL SUCCINATE 25 MG/1
25 TABLET, EXTENDED RELEASE ORAL NIGHTLY
Qty: 90 TABLET | Refills: 3 | Status: SHIPPED | OUTPATIENT
Start: 2022-03-01 | End: 2023-02-27 | Stop reason: SDUPTHER

## 2022-03-01 NOTE — TELEPHONE ENCOUNTER
Caller: Cecile Bennett    Relationship: Self    Best call back number: [phone]    Requested Prescriptions:   Requested Prescriptions     Pending Prescriptions Disp Refills   • metoprolol succinate XL (TOPROL-XL) 25 MG 24 hr tablet 90 tablet 3     Sig: Take 1 tablet by mouth Every Night.        Pharmacy where request should be sent: [facility] [address] [place] [address] [phone] [facility] [phone] FX     Additional details provided by patient:     Does the patient have less than a 3 day supply:  [x] Yes  [] No    Lela Hudson Rep   03/01/22 09:21 EST

## 2022-03-08 ENCOUNTER — TRANSCRIBE ORDERS (OUTPATIENT)
Dept: MAMMOGRAPHY | Facility: HOSPITAL | Age: 82
End: 2022-03-08

## 2022-03-08 ENCOUNTER — HOSPITAL ENCOUNTER (OUTPATIENT)
Dept: MAMMOGRAPHY | Facility: HOSPITAL | Age: 82
Discharge: HOME | End: 2022-03-08

## 2022-03-08 ENCOUNTER — HOSPITAL ENCOUNTER (OUTPATIENT)
Dept: ULTRASOUND IMAGING | Facility: HOSPITAL | Age: 82
Discharge: HOME | End: 2022-03-08

## 2022-03-08 DIAGNOSIS — R92.8 ABNORMAL MAMMOGRAM: Primary | ICD-10-CM

## 2022-03-08 DIAGNOSIS — N64.52 NIPPLE DISCHARGE: ICD-10-CM

## 2022-03-08 PROCEDURE — 77065 DX MAMMO INCL CAD UNI: CPT

## 2022-03-08 PROCEDURE — 77065 DX MAMMO INCL CAD UNI: CPT | Performed by: RADIOLOGY

## 2022-03-08 PROCEDURE — 76641 ULTRASOUND BREAST COMPLETE: CPT

## 2022-03-08 PROCEDURE — 76641 ULTRASOUND BREAST COMPLETE: CPT | Performed by: RADIOLOGY

## 2022-03-08 PROCEDURE — G0279 TOMOSYNTHESIS, MAMMO: HCPCS | Performed by: RADIOLOGY

## 2022-03-08 PROCEDURE — G0279 TOMOSYNTHESIS, MAMMO: HCPCS

## 2022-03-09 ENCOUNTER — HOSPITAL ENCOUNTER (OUTPATIENT)
Dept: MAMMOGRAPHY | Facility: HOSPITAL | Age: 82
Discharge: HOME | End: 2022-03-09

## 2022-03-09 ENCOUNTER — HOSPITAL ENCOUNTER (OUTPATIENT)
Dept: ULTRASOUND IMAGING | Facility: HOSPITAL | Age: 82
Discharge: HOME | End: 2022-03-09

## 2022-03-09 ENCOUNTER — TELEPHONE (OUTPATIENT)
Dept: MAMMOGRAPHY | Facility: HOSPITAL | Age: 82
End: 2022-03-09

## 2022-03-09 DIAGNOSIS — R92.8 ABNORMAL MAMMOGRAM: ICD-10-CM

## 2022-03-09 PROCEDURE — 88305 TISSUE EXAM BY PATHOLOGIST: CPT | Performed by: ADVANCED PRACTICE MIDWIFE

## 2022-03-09 PROCEDURE — 88360 TUMOR IMMUNOHISTOCHEM/MANUAL: CPT | Performed by: ADVANCED PRACTICE MIDWIFE

## 2022-03-09 PROCEDURE — 19083 BX BREAST 1ST LESION US IMAG: CPT | Performed by: RADIOLOGY

## 2022-03-09 PROCEDURE — 0 LIDOCAINE 1 % SOLUTION: Performed by: RADIOLOGY

## 2022-03-09 PROCEDURE — 77065 DX MAMMO INCL CAD UNI: CPT | Performed by: RADIOLOGY

## 2022-03-09 PROCEDURE — 88342 IMHCHEM/IMCYTCHM 1ST ANTB: CPT | Performed by: ADVANCED PRACTICE MIDWIFE

## 2022-03-09 PROCEDURE — A4648 IMPLANTABLE TISSUE MARKER: HCPCS

## 2022-03-09 PROCEDURE — 88341 IMHCHEM/IMCYTCHM EA ADD ANTB: CPT | Performed by: ADVANCED PRACTICE MIDWIFE

## 2022-03-09 RX ORDER — LIDOCAINE HYDROCHLORIDE AND EPINEPHRINE 10; 10 MG/ML; UG/ML
10 INJECTION, SOLUTION INFILTRATION; PERINEURAL ONCE
Status: COMPLETED | OUTPATIENT
Start: 2022-03-09 | End: 2022-03-09

## 2022-03-09 RX ORDER — LIDOCAINE HYDROCHLORIDE 10 MG/ML
5 INJECTION, SOLUTION INFILTRATION; PERINEURAL ONCE
Status: COMPLETED | OUTPATIENT
Start: 2022-03-09 | End: 2022-03-09

## 2022-03-09 RX ADMIN — LIDOCAINE HYDROCHLORIDE,EPINEPHRINE BITARTRATE 2 ML: 10; .01 INJECTION, SOLUTION INFILTRATION; PERINEURAL at 13:59

## 2022-03-09 RX ADMIN — Medication 4 ML: at 13:53

## 2022-03-09 NOTE — TELEPHONE ENCOUNTER
"Returned patient call, states she told tech yesterday has an allergy to nickel but now reports \"metal allergy\"; she gets \"ithchy...has irritation\" when she has worn silver necklace and has had a reaction (a rash) when she wore 14 k gold earrings many years ago. Dr GUTIERREZ Disla aware and will proceed using nickel free clip. Patient notified, states she agrees with plan & would like to proceed. RT Charo and Dr GUTIERREZ Disla aware.    "

## 2022-03-09 NOTE — PROGRESS NOTES
Alert and orientated. Denies discomfort. No active bleeding. Steri-strips not visualized, gauze dressing intact. Ice pack given. Verbalizes and demonstrates understanding of post-care instructions, written copy given.

## 2022-03-11 ENCOUNTER — TELEPHONE (OUTPATIENT)
Dept: MAMMOGRAPHY | Facility: HOSPITAL | Age: 82
End: 2022-03-11

## 2022-03-11 LAB
CYTO UR: NORMAL
LAB AP CASE REPORT: NORMAL
LAB AP CLINICAL INFORMATION: NORMAL
LAB AP DIAGNOSIS COMMENT: NORMAL
PATH REPORT.FINAL DX SPEC: NORMAL
PATH REPORT.GROSS SPEC: NORMAL

## 2022-03-11 NOTE — TELEPHONE ENCOUNTER
Referring provider notified via Epic basket message pathology returned as cancer & patient will be notified.     Patient notified of pathology  results and recommendation. Verbalizes understanding. Denies discomfort. Denies signs and symptoms of infection.     Patient previously requested surgical consult with Dr Rodriguez. Patient notified of appointment on 3.16.22 @ 1100. Patient verbalized understanding.    Patient given office contact & location information. Told to bring photo ID, list of prescription & OTC medications, insurance information, must wear a mask & can bring one person for support also wearing a mask.     Reviewed what would be discussed at surgical consult visit, including detailed explanation of pathology report & imaging reports; treatment options & pros/cons, availability of Breast Nurse Navigator. Patient encouraged to call back or contact Breast Nurse Navigator, with any questions or concerns. Patient information sent to Genetics/Navigator pool for evaluation & possible referral for genetic counseling.    Breast cancer information packet offered and accepted.

## 2022-03-14 ENCOUNTER — NURSE NAVIGATOR (OUTPATIENT)
Dept: ONCOLOGY | Facility: CLINIC | Age: 82
End: 2022-03-14

## 2022-03-14 NOTE — PROGRESS NOTES
Carol Kenyon RN, sent out an Epic in box saying that patient was eligible for Genetics, so I notified Sophia Segura RN, and she will take it for here. AG

## 2022-03-16 ENCOUNTER — LAB (OUTPATIENT)
Dept: LAB | Facility: HOSPITAL | Age: 82
End: 2022-03-16

## 2022-03-16 ENCOUNTER — CLINICAL SUPPORT (OUTPATIENT)
Dept: GENETICS | Facility: HOSPITAL | Age: 82
End: 2022-03-16

## 2022-03-16 ENCOUNTER — HOSPITAL ENCOUNTER (OUTPATIENT)
Dept: OCCUPATIONAL THERAPY | Facility: HOSPITAL | Age: 82
Setting detail: THERAPIES SERIES
Discharge: HOME | End: 2022-03-16

## 2022-03-16 VITALS — BODY MASS INDEX: 23.16 KG/M2 | HEIGHT: 60 IN | WEIGHT: 118 LBS

## 2022-03-16 DIAGNOSIS — Z17.0 MALIGNANT NEOPLASM OF LEFT BREAST IN FEMALE, ESTROGEN RECEPTOR POSITIVE, UNSPECIFIED SITE OF BREAST: ICD-10-CM

## 2022-03-16 DIAGNOSIS — Z80.3 FAMILY HISTORY OF MALIGNANT NEOPLASM OF BREAST: ICD-10-CM

## 2022-03-16 DIAGNOSIS — C50.912 MALIGNANT NEOPLASM OF LEFT BREAST IN FEMALE, ESTROGEN RECEPTOR POSITIVE, UNSPECIFIED SITE OF BREAST: ICD-10-CM

## 2022-03-16 DIAGNOSIS — C50.312 MALIGNANT NEOPLASM OF LOWER-INNER QUADRANT OF LEFT BREAST IN FEMALE, ESTROGEN RECEPTOR POSITIVE: Primary | ICD-10-CM

## 2022-03-16 DIAGNOSIS — Z80.0 FAMILY HISTORY OF PANCREATIC CANCER: ICD-10-CM

## 2022-03-16 DIAGNOSIS — Z17.0 MALIGNANT NEOPLASM OF LOWER-INNER QUADRANT OF LEFT BREAST IN FEMALE, ESTROGEN RECEPTOR POSITIVE: Primary | ICD-10-CM

## 2022-03-16 DIAGNOSIS — Z13.79 GENETIC TESTING: Primary | ICD-10-CM

## 2022-03-16 DIAGNOSIS — C50.912 MALIGNANT NEOPLASM OF LEFT FEMALE BREAST, UNSPECIFIED ESTROGEN RECEPTOR STATUS, UNSPECIFIED SITE OF BREAST: Primary | ICD-10-CM

## 2022-03-16 PROCEDURE — 96040: CPT | Performed by: GENETIC COUNSELOR, MS

## 2022-03-16 PROCEDURE — 97166 OT EVAL MOD COMPLEX 45 MIN: CPT

## 2022-03-16 PROCEDURE — 93702 BIS XTRACELL FLUID ANALYSIS: CPT

## 2022-03-16 NOTE — THERAPY DISCHARGE NOTE
Outpatient Occupational Therapy Lymphedema Initial Evaluation/Discharge  [facility]     Patient Name: Cecile Bennett  : 1940  MRN: [id number]  Today's Date: 3/16/2022        Visit Date: 2022    Patient Active Problem List   Diagnosis   • Ataxia   • Dizziness   • Numbness and tingling of both feet   • PUD (peptic ulcer disease)   • Seasonal allergic rhinitis due to pollen   • Osteoporosis   • Essential hypertension   • Pharyngitis        Past Medical History:   Diagnosis Date   • Breast cancer (HCC) 2009    RIGHT   • Hx of radiation therapy 2009    RT BREAST CANCER   • Osteoporosis         Past Surgical History:   Procedure Laterality Date   • BREAST BIOPSY Right 2009    Stereotactic biopsy   • BREAST LUMPECTOMY Right 2009         Visit Dx:    ICD-10-CM ICD-9-CM   1. Malignant neoplasm of left female breast, unspecified estrogen receptor status, unspecified site of breast ([facility])  C50.912 174.9            Lymphedema     Row Name 22 1200             Subjective Pain    Able to rate subjective pain? yes  -SG      Pre-Treatment Pain Level 0  -SG      Post-Treatment Pain Level 0  -SG              Subjective Comments    Subjective Comments Pt will have genetics today and will have MRI scheduled for next week. She reports that she will continue to think and decide which surgery she will choose, but is thinking bilateral mastectomy at this moment. Pt reports that she does not have any pain or prior orthopedic issues in BUEs that would impair her from healing after surgery. She enjoys walking for activity/movement.  -SG              Lymphedema Assessment    Lymphedema Classification LUE:;at risk/stage 0  -SG              Posture/Observations    Posture- WNL Posture is WNL  -SG              General ROM    GENERAL ROM COMMENTS BUE WNL  -SG              MMT (Manual Muscle Testing)    General MMT Comments BUE WNL  -SG              Lymphedema Edema Assessment    Edema Assessment Comment Pt  does not demo any edema at this time  -SG              Skin Changes/Observations    Location/Assessment Upper Extremity;Upper Quadrant  -SG      Upper Extremity Conditions bilateral:;normal  -SG      Upper Extremity Color/Pigment bilateral:;normal  -SG      Upper Quadrant Conditions bilateral:;normal  -SG      Upper Quadrant Color/Pigment bilateral:;normal  -SG              Lymphedema Sensation    Lymphedema Sensation Reports normal  -SG              L-Dex Bioimpedence Screening    L-Dex Measurement Extremity LUE  -SG      L-Dex Patient Position Standing  -SG      L-Dex UE Dominate Side Right  -SG      L-Dex UE At Risk Side Left  -SG      L-Dex UE Pre Surgical Value Yes  -SG      L-Dex UE Baseline Score 2.4  -SG      L-Dex UE Comment The patient had intial SOZO measurement which I reviewed today. The score is in normal range, 2.4, see scanned to EMR. Bioimpedance spectroscopy helps identify the onset of lymphedema in an arm or leg before patients experience noticeable swelling. Research has shown that the early detection of lymphedema using L-Dex combined with treatment can reduce progression to chronic lymphedema by 95% in breast cancer patients. Whenever possible, patients are tested for baseline L-Dex score before cancer treatment begins and then are reassessed during regular follow-up visits using the SOZO device. Otherwise, this can be started postoperatively and continued during regular follow-up visits. If the patient’s L-Dex score increases above normal levels, that is a sign that lymphedema is developing and a referral is made to occupational/physical therapy for further evaluation and early compression treatment. Lymphedema assessment with the SOZO L-Dex score is recommended to be done every 3 months for the first 3 years and then every 6 months for years 4 and 5 followed by annually afterwards.  -SG      Skeletal Muscle Mass (%) 28.6 %  -SG      Fat Mass (%) 25.1 %  -SG      Hy-dex 7  -SG      Height  "152.4 cm (60\")  -      Weight 53.5 kg (118 lb)  -SG      BMI (Calculated) 23  -            User Key  (r) = Recorded By, (t) = Taken By, (c) = Cosigned By    Initials Name Provider Type    Tata Taylor OTR/L Occupational Therapist                        Therapy Education  Education Details: Pt and daughter edu on prehab assessments including bioimpedance Ldex score, implications for lymphedema, etc. She was provided with [facility]AB[facility] information where she can join our free educational class to learn more about lymphedema, exercise, etc.  Given: HEP, Symptoms/condition management  Program: New  How Provided: Verbal, Written  Provided to: Patient (Daughter)  Level of Understanding: Verbalized             OT Goals     Row Name 03/16/22 1200          OT Short Term Goals    STG Date to Achieve 04/15/22  -     STG 1 Pt demonstrates awareness of post-operative movement restrictions and HEP to facilitate lymphatic regeneration and reduce the risk of seroma formation, axillary web syndrome, and lymphedema while ensuring shoulder joint mobility.  -SG     STG 1 Progress Met  -SG     STG 2 Pt demonstrates understanding of post-operative basic lymphedema precautions.  -     STG 2 Progress Met  -SG            Time Calculation    OT Goal Re-Cert Due Date 06/14/22  -           User Key  (r) = Recorded By, (t) = Taken By, (c) = Cosigned By    Initials Name Provider Type    Tata Taylor OTR/L Occupational Therapist                 OT Assessment/Plan     Row Name 03/16/22 1200          OT Assessment    Assessment Comments Ms. Bennett presents to OT pre-operatively for planned BrCA surgery scheduled in a couple weeks. Baseline ROM, postural, and bioimpedance measurements were taken today to be compared to measurements retaken 3-4 weeks post surgery. At that time, any reduced movement, decline in function, or postural issues will be addressed with skilled care and new goals will be established.  Personal risk factors for " lymphedema post-operatively for the L upper extremity and trunk quadrant were also assessed today and basic lymphedema precautions were discussed. A more detailed discussion regarding personal lymphedema risk factors will take place post-operatively once the number of lymph nodes removed and the plan for further medical care is known.  -SG     Please refer to paper survey for additional self-reported information Yes  -SG     OT Diagnosis BrCA  -SG     OT Rehab Potential Excellent  -SG     Patient/caregiver participated in establishment of treatment plan and goals Yes  -SG     Patient would benefit from skilled therapy intervention Yes  -SG            OT Plan    Planned CPT's? OT EVAL MOD COMPLEXITY: 41793;OT THER ACT EA 15 MIN: [id number];OT THER PROC EA 15 MIN: 01964LC;OT SELF CARE/MGMT/TRAIN 15 MIN: 87262;OT MANUAL THERAPY EA 15 MIN: 95475;OT BIS XTRACELL FLUID ANALYSIS: 87003  -SG     Planned Therapy Interventions (Optional Details) home exercise program;joint mobilization;manual therapy techniques;patient/family education;postural re-education;ROM (Range of Motion);strengthening;stretching  -SG     OT Plan Comments Ms. Bennett may return to OT 3-4 weeks post operatively for re-evaluation measurements to be compared to measurements taken today, at her pre-operative evaluation. In addition, she will be examined for possible post-BrCA surgery sequelae such as axillary web syndrome, scar adhesions, edema, worsened posture, scapular winging, pain, and reduced ROM and function. At that time, a future plan and goals will be established and skilled care continued if indicated. Currently, Ms. Bennett has been provided with information for healing after breast surgery including lymphedema education, safe exercising/stretching, etc. in order to facilitate recovery and reduce the risk of post-operative sequelae.  -SG           User Key  (r) = Recorded By, (t) = Taken By, (c) = Cosigned By    Initials Name Provider Type    SG  Tata Russell OTR/L Occupational Therapist                Outcome Measure Options: Quick DASH  Quick DASH  Open a tight or new jar.: Mild Difficulty  Do heavy household chores (e.g., wash walls, wash floors): Mild Difficulty  Carry a shopping bag or briefcase: No Difficulty  Wash your back: No Difficulty  Use a knife to cut food: No Difficulty  Recreational activities in which you take some force or impact through your arm, should or hand (e.g. golf, hammering, tennis, etc.): Mild Difficulty  During the past week, to what extent has your arm, shoulder, or hand problem interfered with your normal social activites with family, friends, neighbors or groups?: Not at all  During the past week, were you limited in your work or other regular daily activities as a result of your arm, shoulder or hand problem?: Not limited at all  Arm, Shoulder, or hand pain: None  Tingling (pins and needles) in your arm, shoulder, or hand: None  During the past week, how much difficulty have you had sleeping because of the pain in your arm, shoulder or hand?: No difficulty  Number of Questions Answered: 11  Quick DASH Score: 6.82         Time Calculation:   OT Start Time: 1200     Therapy Charges for Today     Code Description Service Date Service Provider Modifiers Qty    06866857283 HC OT BIS XTRACELL FLUID ANALYSIS 3/16/2022 Tata Russell OTR/L GO 1    70017830052 HC OT EVAL MOD COMPLEXITY 3 3/16/2022 Tata Russell OTR/L GO 1                       AYESHA Titus/CAMILLE  3/16/2022

## 2022-03-17 ENCOUNTER — NURSE NAVIGATOR (OUTPATIENT)
Dept: OTHER | Facility: HOSPITAL | Age: 82
End: 2022-03-17

## 2022-03-17 NOTE — PROGRESS NOTES
The patient left message that she could not get her tooth extracted at her dentist - her dentist office [facility] called me to say that they could not extract her tooth and they did not know who could with her insurance. I called  [facility] and spoke to Talia in the Oral surgery division.  She stated to fax over referral and they would see her right away - I called Rosa Nation with Dr FREY and she is faxing referral. I called patient to let her know and asked that she call me Monday to let me know if she had heard from  to scheduled appointment.

## 2022-03-17 NOTE — PROGRESS NOTES
Cecile Bennett, a 81-year-old female, was seen for genetic counseling due to a personal history of breast cancer.  Ms. Bennett was recently diagnosed with a left breast cancer at age 81. She is in the process of making a surgical decision. Ms. Bennett also has a personal history of a right breast cancer diagnosed at age 69 and she had a lumpectomy at that time. Ms. Bennett retains her uterus and ovaries. She does not report a history of colon polyps. She was interested in discussing her risk for a hereditary cancer syndrome.  Ms. Bennett was interested in pursuing a multi-gene panel to evaluate her risk of cancer, therefore the CancerNext panel was ordered through [employer] which analyzes BRCA1/2 and 34 additional genes associated with an increased cancer risk. Results from the high/moderate risk breast cancer genes are expected in 10 days, and results from the remainder of the panel are expected in 2-3 weeks.    PERTINENT FAMILY HISTORY: (See attached pedigree)   Sister:   Breast cancer, 68  Brother:  Pancreatic cancer, 82  Sister:   Unspecified cancer type, d. 70  Father:   Lung cancer  Pat. Aunt:  Possible colon cancer  Mat. 1st cousin: Breast cancer, 42    We do not have medical records regarding any of these diagnoses.      RISK ASSESSMENT:  Ms. Bennett’s personal and family history of cancer led to concern for a hereditary cancer syndrome. We discussed BRCA1/2 testing as well as the option of pursuing a panel that would test for other genes known to impact cancer risk in addition to BRCA1/2.   Ms. Bennett clearly meets NCCN guidelines criteria for BRCA1/2 testing based on her personal history of breast cancer and family history of breast and pancreatic cancer. These risk assessments are based on the family history information provided at the time of the appointment, and could change in the future should new information be obtained.    GENETIC COUNSELING: (30 minutes) We reviewed the family history  information in detail. Cases of cancer follow three general patterns: sporadic, familial, and hereditary.  While most cancer is sporadic, some cases appear to occur in family clusters.  These cases are said to be familial and account for 10-20% of cancer cases.  Familial cases may be due to a combination of shared genes and environmental factors among family members.  In even fewer families (5-10%), the cancer is said to be inherited, and the genes responsible for the cancer are known.      Family histories typical of hereditary cancer syndromes usually include multiple first- and second-degree relatives diagnosed with cancer types that define a syndrome.  These cases tend to be diagnosed at younger-than-expected ages and can be bilateral or multifocal.  The cancer in these families follows an autosomal dominant inheritance pattern, which indicates the likely presence of a mutation in a cancer susceptibility gene.  Children and siblings of an individual believed to carry this mutation have a 50% chance of inheriting that mutation, thereby inheriting the increased risk to develop cancer.  These mutations can be passed down from the maternal or the paternal lineage.    Hereditary breast cancer accounts for 5-10% of all cases of breast cancer.  A significant proportion of hereditary breast and ovarian cancer can be attributed to mutations in the BRCA1 and BRCA2 genes.  Mutations in these genes confer an increased risk for breast cancer, ovarian cancer, male breast cancer, prostate cancer, and pancreatic cancer. Women with a BRCA1 or BRCA2 mutation who have already been diagnosed with breast cancer have a 40-60% lifetime risk of a second breast cancer. Women with a BRCA1 or BRCA2 mutation have up to a 44% risk of ovarian cancer.      There are other genes that are known to be associated with an increased risk for cancer.  Some of these genes have well defined cancer risks and established management guidelines.  Other  genes that can be tested for have been more recently described, and there may be less data regarding the risks and therefore may not have established management guidelines. We discussed these limitations at length.  Based on Ms. Bennett’s desire to get as much information as possible regarding her personal risks and potential risks for her family, she opted to pursue testing through a panel that would look at several other genes known to increase the risk for cancer.    GENETIC TESTING:  The risks, benefits and limitations of genetic testing and implications for clinical management following testing were reviewed.  DNA test results can influence decisions regarding screening, prevention and surgical management.  Genetic testing can have significant psychological implications for both individuals and families.  Also discussed was the possibility of employment and insurance discrimination based on genetic test results and the laws in place to prevent this (LUCAS).    We discussed panel testing, which would involve testing for BRCA1/2 as well as 34 additional genes that are associated with increased cancer risk. The benefits and limitations of genetic testing were discussed and Ms. Bennett decided to pursue testing via the panel. The implications of a positive or negative test result were discussed. We discussed the possibility that, in some cases, genetic test results may be informative or may be ambiguous due to the identification of a genetic variant. These variants may or may not be associated with an increased cancer risk.  With multigene panel testing, it is not uncommon for a variant of uncertain significance (VUS) to be identified.  If a VUS is identified, testing family members is typically not recommended and screening recommendations are made based on the family history.  The laboratories that perform genetic testing work to reclassify the VUS and send out an amended report if and when a VUS is reclassified.   The majority of variant findings are ultimately reclassified to a negative result.  Given her personal and family history, a negative test result would not eliminate all cancer risk to her relatives, although the risk would not be as high as it would with positive genetic testing.      PLAN: Genetic testing via the CancerNext panel through [employer] was ordered and results from the high/moderate risk breast cancer genes are expected in 10 days, and results from the remainder of the panel are expected in 2-3 weeks. Ms. Bennett is welcome to contact us in the meantime with any questions she may have at [phone].       Argenis Carranza MS, [facility] [place]  Licensed Certified Genetic Counselor

## 2022-03-18 ENCOUNTER — APPOINTMENT (OUTPATIENT)
Dept: MAMMOGRAPHY | Facility: HOSPITAL | Age: 82
End: 2022-03-18

## 2022-03-21 ENCOUNTER — TELEPHONE (OUTPATIENT)
Dept: MRI IMAGING | Facility: HOSPITAL | Age: 82
End: 2022-03-21

## 2022-03-21 ENCOUNTER — NURSE NAVIGATOR (OUTPATIENT)
Dept: OTHER | Facility: HOSPITAL | Age: 82
End: 2022-03-21

## 2022-03-21 NOTE — TELEPHONE ENCOUNTER
Called pt to schedule her MRI Breast and she is having a double mastectomy and stated she did not need to schedule an MRI. Note sent to AJ's office!

## 2022-03-21 NOTE — PROGRESS NOTES
Patient called to say that she has not heard from [facility] to schedule appointment for her - I called [facility] and made appointment for tomorrow at 1:00 - the directions are involved - I spoke to patient's daughter, Nuria. I gave her the directions and she stated that she would take patient to appointment.

## 2022-03-25 ENCOUNTER — TELEPHONE (OUTPATIENT)
Dept: GENETICS | Facility: HOSPITAL | Age: 82
End: 2022-03-25

## 2022-03-25 NOTE — TELEPHONE ENCOUNTER
I called the patient to discuss her genetic test results.  The high/moderate breast cancer risk portion of the panel was resulted out first in order to expedite surgical decision making, and testing was negative for mutations in BRCA1/2, STANISLAV, CHEK2, CDH1, PALB2, PTEN, and TP53.  The remainder of the CancerNext panel, including lower risk genes, non-breast cancer related genes, and RNA analysis is still pending, and the patient will be contacted once those results are available. Full summary note, pedigree, and results will be sent to patient and referring provider once full panel results are back.

## 2022-03-28 ENCOUNTER — NURSE NAVIGATOR (OUTPATIENT)
Dept: OTHER | Facility: HOSPITAL | Age: 82
End: 2022-03-28

## 2022-03-28 NOTE — PROGRESS NOTES
Patient called to let us know that she had her tooth extracted at  [facility], Thursday 3/24/2022 and she is ready to schedule surgery -   I sent email to Rosa with Dr. FREY.

## 2022-03-30 ENCOUNTER — DOCUMENTATION (OUTPATIENT)
Dept: GENETICS | Facility: HOSPITAL | Age: 82
End: 2022-03-30

## 2022-03-30 NOTE — PROGRESS NOTES
Cecile Bennett, a 81-year-old female, was seen for genetic counseling due to a personal history of breast cancer.  Ms. Bennett was recently diagnosed with a left breast cancer at age 81. She is in the process of making a surgical decision. Ms. Bennett also has a personal history of a right breast cancer diagnosed at age 69 and she had a lumpectomy at that time. Ms. Benntet retains her uterus and ovaries. She does not report a history of colon polyps. She was interested in discussing her risk for a hereditary cancer syndrome.  Ms. Bennett was interested in pursuing a multi-gene panel to evaluate her risk of cancer, therefore the CancerNext panel was ordered through [employer] which analyzes BRCA1/2 and 34 additional genes associated with an increased cancer risk. The genes on this panel include APC, STANISLAV, AXIN2, BARD1, BMPR1A, BRCA1, BRCA2, BRIP1, CDH1, CDK4, CDKN2A, CHEK2, DICER1, EPCAM, GREM1, HOXB13, MLH1, MSH2, MSH3, MSH6, MUTYH, NBN, NF1, NTHL1, PALB2, PMS2, POLD1, POLE, PTEN, RAD51C, RAD51D, RECQL, SMAD4, SMARCA4, STK11, and TP53. Genetic testing was negative for pathogenic mutations in BRCA1/2 and 34 additional genes on the CancerNext panel.  These normal results were discussed with Ms. Bennett by telephone on 3/30/2022.      PERTINENT FAMILY HISTORY: (See attached pedigree)   Sister:   Breast cancer, 68  Brother:  Pancreatic cancer, 82  Sister:   Unspecified cancer type, d. 70  Father:   Lung cancer  Pat. Aunt:  Possible colon cancer  Mat. 1st cousin: Breast cancer, 42    We do not have medical records regarding any of these diagnoses.      RISK ASSESSMENT:  Ms. Bennett’s personal and family history of cancer led to concern for a hereditary cancer syndrome. We discussed BRCA1/2 testing as well as the option of pursuing a panel that would test for other genes known to impact cancer risk in addition to BRCA1/2.   Ms. Bennett clearly meets NCCN guidelines criteria for BRCA1/2 testing based on her personal  history of breast cancer and family history of breast and pancreatic cancer. These risk assessments are based on the family history information provided at the time of the appointment, and could change in the future should new information be obtained.    GENETIC COUNSELING: We reviewed the family history information in detail. Cases of cancer follow three general patterns: sporadic, familial, and hereditary.  While most cancer is sporadic, some cases appear to occur in family clusters.  These cases are said to be familial and account for 10-20% of cancer cases.  Familial cases may be due to a combination of shared genes and environmental factors among family members.  In even fewer families (5-10%), the cancer is said to be inherited, and the genes responsible for the cancer are known.      Family histories typical of hereditary cancer syndromes usually include multiple first- and second-degree relatives diagnosed with cancer types that define a syndrome.  These cases tend to be diagnosed at younger-than-expected ages and can be bilateral or multifocal.  The cancer in these families follows an autosomal dominant inheritance pattern, which indicates the likely presence of a mutation in a cancer susceptibility gene.  Children and siblings of an individual believed to carry this mutation have a 50% chance of inheriting that mutation, thereby inheriting the increased risk to develop cancer.  These mutations can be passed down from the maternal or the paternal lineage.    Hereditary breast cancer accounts for 5-10% of all cases of breast cancer.  A significant proportion of hereditary breast and ovarian cancer can be attributed to mutations in the BRCA1 and BRCA2 genes.  Mutations in these genes confer an increased risk for breast cancer, ovarian cancer, male breast cancer, prostate cancer, and pancreatic cancer. Women with a BRCA1 or BRCA2 mutation who have already been diagnosed with breast cancer have a 40-60%  lifetime risk of a second breast cancer. Women with a BRCA1 or BRCA2 mutation have up to a 44% risk of ovarian cancer.      There are other genes that are known to be associated with an increased risk for cancer.  Some of these genes have well defined cancer risks and established management guidelines.  Other genes that can be tested for have been more recently described, and there may be less data regarding the risks and therefore may not have established management guidelines. We discussed these limitations at length.  Based on Ms. Bennett’s desire to get as much information as possible regarding her personal risks and potential risks for her family, she opted to pursue testing through a panel that would look at several other genes known to increase the risk for cancer.    GENETIC TESTING:  The risks, benefits and limitations of genetic testing and implications for clinical management following testing were reviewed.  DNA test results can influence decisions regarding screening, prevention and surgical management.  Genetic testing can have significant psychological implications for both individuals and families.  Also discussed was the possibility of employment and insurance discrimination based on genetic test results and the laws in place to prevent this (LUCAS).    We discussed panel testing, which would involve testing for BRCA1/2 as well as 34 additional genes that are associated with increased cancer risk. The benefits and limitations of genetic testing were discussed and Ms. Bennett decided to pursue testing via the panel. The implications of a positive or negative test result were discussed. We discussed the possibility that, in some cases, genetic test results may be informative or may be ambiguous due to the identification of a genetic variant. These variants may or may not be associated with an increased cancer risk.  With multigene panel testing, it is not uncommon for a variant of uncertain  significance (VUS) to be identified.  If a VUS is identified, testing family members is typically not recommended and screening recommendations are made based on the family history.  The laboratories that perform genetic testing work to reclassify the VUS and send out an amended report if and when a VUS is reclassified.  The majority of variant findings are ultimately reclassified to a negative result.  Given her personal and family history, a negative test result would not eliminate all cancer risk to her relatives, although the risk would not be as high as it would with positive genetic testing.      TEST RESULTS:  Genetic testing was negative for known pathogenic mutations by sequencing, rearrangement testing, and RNA analysis for the genes on the CancerNext panel (see attached results). This negative result greatly lowers but does not eliminate the risk of a hereditary cancer syndrome for Ms. Bennett. This assessment is based on the information provided at time of consultation.    CANCER SCREENING:  Ms. Bennett’s surveillance and management should be determined by her oncology team. Despite the negative genetic test results, Ms. Bennett’s female relatives may have a somewhat increased lifetime risk for breast cancer based on family history. Female relatives could have a risk assessment performed using a family history-based model, such as the Tyrer-Cuzick model, to determine their individual risks.    Given the increased risk, options available to individuals with a high lifetime risk for breast cancer were briefly discussed.   Surveillance for individuals with a high lifetime risk of breast cancer (>20%, versus the average risk of 12%), based on NCCN guidelines, would consist of semi-annual clinical breast exams and monthly self-breast exams starting by age 18 and annual mammography starting 10 years younger than the earliest diagnosis in a close relative, or starting by age 40, whichever is earliest.   According to an American Cancer Society expert panel, annual breast MRI should be offered to women whose lifetime risk of breast cancer is 20-25 percent or more, also starting by age 40 or earlier if indicated by family history.      PLAN: Genetic counseling remains available to Ms. Bennett and her family. She is welcome to contact us with any questions or concerns at [phone].      Argenis Carranza MS, [facility] [place]  Licensed Certified Genetic Counselor       Cc: Cecile Manzano MD

## 2022-04-04 ENCOUNTER — NURSE NAVIGATOR (OUTPATIENT)
Dept: ONCOLOGY | Facility: CLINIC | Age: 82
End: 2022-04-04

## 2022-04-04 NOTE — PROGRESS NOTES
Patient called navigator help line inquiring if surgery with Dr. FREY has been scheduled. Spoke with Kayden at [facility] and they are awaiting those orders but expect them to be signed this evening or tomorrow. Notified patient. She v/u and knows to call with questions or concerns.

## 2022-04-05 ENCOUNTER — OFFICE VISIT (OUTPATIENT)
Dept: INTERNAL MEDICINE | Facility: CLINIC | Age: 82
End: 2022-04-05

## 2022-04-05 ENCOUNTER — LAB (OUTPATIENT)
Dept: LAB | Facility: HOSPITAL | Age: 82
End: 2022-04-05

## 2022-04-05 VITALS
WEIGHT: 117 LBS | BODY MASS INDEX: 22.97 KG/M2 | HEART RATE: 73 BPM | DIASTOLIC BLOOD PRESSURE: 70 MMHG | HEIGHT: 60 IN | TEMPERATURE: 96.9 F | SYSTOLIC BLOOD PRESSURE: 126 MMHG | OXYGEN SATURATION: 100 %

## 2022-04-05 DIAGNOSIS — R60.0 LEG EDEMA, LEFT: ICD-10-CM

## 2022-04-05 DIAGNOSIS — R73.09 ABNORMAL GLUCOSE: ICD-10-CM

## 2022-04-05 DIAGNOSIS — I10 ESSENTIAL HYPERTENSION: Primary | ICD-10-CM

## 2022-04-05 DIAGNOSIS — D64.9 ANEMIA, UNSPECIFIED TYPE: ICD-10-CM

## 2022-04-05 DIAGNOSIS — K27.9 PUD (PEPTIC ULCER DISEASE): ICD-10-CM

## 2022-04-05 LAB
DEPRECATED RDW RBC AUTO: 44 FL (ref 37–54)
ERYTHROCYTE [DISTWIDTH] IN BLOOD BY AUTOMATED COUNT: 13.1 % (ref 12.3–15.4)
HBA1C MFR BLD: 5.6 % (ref 4.8–5.6)
HCT VFR BLD AUTO: 42.7 % (ref 34–46.6)
HGB BLD-MCNC: 13.9 G/DL (ref 12–15.9)
MCH RBC QN AUTO: 30 PG (ref 26.6–33)
MCHC RBC AUTO-ENTMCNC: 32.6 G/DL (ref 31.5–35.7)
MCV RBC AUTO: 92.2 FL (ref 79–97)
PLATELET # BLD AUTO: 166 10*3/MM3 (ref 140–450)
PMV BLD AUTO: 12.8 FL (ref 6–12)
RBC # BLD AUTO: 4.63 10*6/MM3 (ref 3.77–5.28)
WBC NRBC COR # BLD: 6.7 10*3/MM3 (ref 3.4–10.8)

## 2022-04-05 PROCEDURE — 83036 HEMOGLOBIN GLYCOSYLATED A1C: CPT | Performed by: INTERNAL MEDICINE

## 2022-04-05 PROCEDURE — 83540 ASSAY OF IRON: CPT | Performed by: INTERNAL MEDICINE

## 2022-04-05 PROCEDURE — 85027 COMPLETE CBC AUTOMATED: CPT | Performed by: INTERNAL MEDICINE

## 2022-04-05 PROCEDURE — 80053 COMPREHEN METABOLIC PANEL: CPT | Performed by: INTERNAL MEDICINE

## 2022-04-05 PROCEDURE — 99214 OFFICE O/P EST MOD 30 MIN: CPT | Performed by: INTERNAL MEDICINE

## 2022-04-05 NOTE — PROGRESS NOTES
Patient is a 82 y.o. female who is here for a follow up of hypertension.  Chief Complaint   Patient presents with   • Hypertension         HPI:    Here for mgmt of HTN and PUD.  Has upcoming breast surgery.  Occasional dizziness, worst with change in position.  No HAs.  No abdominal pains.  No fever or chills.  Appetite is good.      History:     Patient Active Problem List   Diagnosis   • Ataxia   • Dizziness   • Numbness and tingling of both feet   • PUD (peptic ulcer disease)   • Seasonal allergic rhinitis due to pollen   • Osteoporosis   • Essential hypertension   • Pharyngitis   • Leg edema, left       Past Medical History:   Diagnosis Date   • Breast cancer (HCC) 2009    RIGHT   • Hx of radiation therapy 2009    RT BREAST CANCER   • Osteoporosis        Past Surgical History:   Procedure Laterality Date   • BREAST BIOPSY Right 04/27/2009    Stereotactic biopsy   • BREAST LUMPECTOMY Right 05/26/2009       Current Outpatient Medications on File Prior to Visit   Medication Sig   • Calcium Carb-Cholecalciferol (CALCIUM 1000 + D PO) Take  by mouth.   • ferrous sulfate 325 (65 Fe) MG tablet Take 1 tablet by mouth Daily With Breakfast.   • loratadine (CLARITIN) 10 MG tablet Take  by mouth Daily.   • metoprolol succinate XL (TOPROL-XL) 25 MG 24 hr tablet Take 1 tablet by mouth Every Night.   • omeprazole (priLOSEC) 20 MG capsule Take 1 capsule by mouth Every Morning.   • [DISCONTINUED] cephalexin (Keflex) 500 MG capsule Take 1 capsule by mouth 2 (Two) Times a Day.     No current facility-administered medications on file prior to visit.       Family History   Problem Relation Age of Onset   • Breast cancer Sister 67   • Breast cancer Cousin 40   • Dementia Brother    • Pancreatic cancer Brother    • Stroke Maternal Grandmother    • Ovarian cancer Neg Hx        Social History     Socioeconomic History   • Marital status:    Tobacco Use   • Smoking status: Former Smoker     Quit date: 1987     Years since  "quittin.2   • Smokeless tobacco: Never Used   Substance and Sexual Activity   • Alcohol use: Yes   • Drug use: Never   • Sexual activity: Not Currently         Review of Systems   Constitutional: Negative for chills, fatigue, fever and unexpected weight change.   HENT: Negative for congestion, ear pain, hearing loss, rhinorrhea, sinus pressure and trouble swallowing.    Eyes: Negative for discharge and itching.   Respiratory: Negative for chest tightness and shortness of breath.    Cardiovascular: Positive for leg swelling. Negative for chest pain and palpitations.   Gastrointestinal: Negative for abdominal pain, blood in stool, constipation, diarrhea and vomiting.   Endocrine: Negative for polydipsia and polyuria.   Genitourinary: Negative for difficulty urinating, dysuria, enuresis, frequency, hematuria and urgency.   Musculoskeletal: Negative for arthralgias, back pain, gait problem and joint swelling.        Left bunion   Skin: Negative for rash and wound.   Allergic/Immunologic: Negative for immunocompromised state.   Neurological: Negative for dizziness, syncope, weakness, light-headedness, numbness and headaches.   Hematological: Does not bruise/bleed easily.   Psychiatric/Behavioral: Negative for behavioral problems, dysphoric mood and sleep disturbance. The patient is not nervous/anxious.        /70   Pulse 73   Temp 96.9 °F (36.1 °C) (Infrared)   Ht 152.4 cm (60\")   Wt 53.1 kg (117 lb)   SpO2 100%   BMI 22.85 kg/m²       Physical Exam  Constitutional:       Appearance: Normal appearance. She is well-developed.   HENT:      Head: Normocephalic and atraumatic.      Right Ear: External ear normal.      Left Ear: External ear normal.      Nose: Nose normal.      Mouth/Throat:      Mouth: Mucous membranes are moist.      Pharynx: Oropharynx is clear.   Eyes:      Extraocular Movements: Extraocular movements intact.      Conjunctiva/sclera: Conjunctivae normal.      Pupils: Pupils are equal, " round, and reactive to light.   Cardiovascular:      Rate and Rhythm: Normal rate and regular rhythm.      Heart sounds: Normal heart sounds.   Pulmonary:      Effort: Pulmonary effort is normal.      Breath sounds: Normal breath sounds.   Abdominal:      General: Bowel sounds are normal.      Palpations: Abdomen is soft.   Musculoskeletal:         General: Normal range of motion.      Cervical back: Normal range of motion and neck supple.      Left lower leg: Edema present.   Lymphadenopathy:      Cervical: No cervical adenopathy.   Skin:     General: Skin is warm and dry.   Neurological:      General: No focal deficit present.      Mental Status: She is alert and oriented to person, place, and time.   Psychiatric:         Mood and Affect: Mood normal.         Behavior: Behavior normal.         Thought Content: Thought content normal.         Procedure:      Discussion/Summary:    HTN-cont BB, advised goal of 130/80  PUD-controlled on PPI but reduce to qod  Rhinitis-cont claritin  Osteoporosis-check vit D on rtc,  Advised weight bearing exercise and at least vit D 2000 IU qd  Anemia-labs today at goal  Abnormal glucose-counseled on diet, labs today at goal     4/5 labs noted and dw patient       Current Outpatient Medications:   •  Calcium Carb-Cholecalciferol (CALCIUM 1000 + D PO), Take  by mouth., Disp: , Rfl:   •  ferrous sulfate 325 (65 Fe) MG tablet, Take 1 tablet by mouth Daily With Breakfast., Disp: 30 tablet, Rfl: 5  •  loratadine (CLARITIN) 10 MG tablet, Take  by mouth Daily., Disp: , Rfl:   •  metoprolol succinate XL (TOPROL-XL) 25 MG 24 hr tablet, Take 1 tablet by mouth Every Night., Disp: 90 tablet, Rfl: 3  •  omeprazole (priLOSEC) 20 MG capsule, Take 1 capsule by mouth Every Morning., Disp: 90 capsule, Rfl: 2        Diagnoses and all orders for this visit:    1. Essential hypertension (Primary)    2. PUD (peptic ulcer disease)    3. Leg edema, left  -     Duplex Venous Lower Extremity - Left CAR    4.  Abnormal glucose  -     Comprehensive Metabolic Panel  -     Hemoglobin A1c    5. Anemia, unspecified type  -     CBC (No Diff)  -     Iron

## 2022-04-06 LAB
ALBUMIN SERPL-MCNC: 4.2 G/DL (ref 3.5–5.2)
ALBUMIN/GLOB SERPL: 1.5 G/DL
ALP SERPL-CCNC: 65 U/L (ref 39–117)
ALT SERPL W P-5'-P-CCNC: 17 U/L (ref 1–33)
ANION GAP SERPL CALCULATED.3IONS-SCNC: 10 MMOL/L (ref 5–15)
AST SERPL-CCNC: 29 U/L (ref 1–32)
BILIRUB SERPL-MCNC: 0.5 MG/DL (ref 0–1.2)
BUN SERPL-MCNC: 12 MG/DL (ref 8–23)
BUN/CREAT SERPL: 13.3 (ref 7–25)
CALCIUM SPEC-SCNC: 9.8 MG/DL (ref 8.6–10.5)
CHLORIDE SERPL-SCNC: 104 MMOL/L (ref 98–107)
CO2 SERPL-SCNC: 28 MMOL/L (ref 22–29)
CREAT SERPL-MCNC: 0.9 MG/DL (ref 0.57–1)
EGFRCR SERPLBLD CKD-EPI 2021: 64.8 ML/MIN/1.73
GLOBULIN UR ELPH-MCNC: 2.8 GM/DL
GLUCOSE SERPL-MCNC: 70 MG/DL (ref 65–99)
IRON 24H UR-MRATE: 136 MCG/DL (ref 37–145)
POTASSIUM SERPL-SCNC: 4.2 MMOL/L (ref 3.5–5.2)
PROT SERPL-MCNC: 7 G/DL (ref 6–8.5)
SODIUM SERPL-SCNC: 142 MMOL/L (ref 136–145)

## 2022-04-07 ENCOUNTER — HOSPITAL ENCOUNTER (OUTPATIENT)
Dept: CARDIOLOGY | Facility: HOSPITAL | Age: 82
Discharge: HOME | End: 2022-04-07
Admitting: INTERNAL MEDICINE

## 2022-04-07 VITALS — WEIGHT: 117.06 LBS | BODY MASS INDEX: 22.98 KG/M2 | HEIGHT: 60 IN

## 2022-04-07 LAB
BH CV LOWER VASCULAR LEFT COMMON FEMORAL AUGMENT: NORMAL
BH CV LOWER VASCULAR LEFT COMMON FEMORAL COMPETENT: NORMAL
BH CV LOWER VASCULAR LEFT COMMON FEMORAL COMPRESS: NORMAL
BH CV LOWER VASCULAR LEFT COMMON FEMORAL PHASIC: NORMAL
BH CV LOWER VASCULAR LEFT COMMON FEMORAL SPONT: NORMAL
BH CV LOWER VASCULAR LEFT DISTAL FEMORAL AUGMENT: NORMAL
BH CV LOWER VASCULAR LEFT DISTAL FEMORAL COMPETENT: NORMAL
BH CV LOWER VASCULAR LEFT DISTAL FEMORAL COMPRESS: NORMAL
BH CV LOWER VASCULAR LEFT DISTAL FEMORAL PHASIC: NORMAL
BH CV LOWER VASCULAR LEFT DISTAL FEMORAL SPONT: NORMAL
BH CV LOWER VASCULAR LEFT GASTRONEMIUS COMPRESS: NORMAL
BH CV LOWER VASCULAR LEFT GREATER SAPH AK COMPRESS: NORMAL
BH CV LOWER VASCULAR LEFT GREATER SAPH BK COMPRESS: NORMAL
BH CV LOWER VASCULAR LEFT LESSER SAPH COMPRESS: NORMAL
BH CV LOWER VASCULAR LEFT MID FEMORAL AUGMENT: NORMAL
BH CV LOWER VASCULAR LEFT MID FEMORAL COMPETENT: NORMAL
BH CV LOWER VASCULAR LEFT MID FEMORAL COMPRESS: NORMAL
BH CV LOWER VASCULAR LEFT MID FEMORAL PHASIC: NORMAL
BH CV LOWER VASCULAR LEFT MID FEMORAL SPONT: NORMAL
BH CV LOWER VASCULAR LEFT PERONEAL COMPRESS: NORMAL
BH CV LOWER VASCULAR LEFT POPLITEAL AUGMENT: NORMAL
BH CV LOWER VASCULAR LEFT POPLITEAL COMPETENT: NORMAL
BH CV LOWER VASCULAR LEFT POPLITEAL COMPRESS: NORMAL
BH CV LOWER VASCULAR LEFT POPLITEAL PHASIC: NORMAL
BH CV LOWER VASCULAR LEFT POPLITEAL SPONT: NORMAL
BH CV LOWER VASCULAR LEFT POSTERIOR TIBIAL COMPRESS: NORMAL
BH CV LOWER VASCULAR LEFT PROFUNDA FEMORAL AUGMENT: NORMAL
BH CV LOWER VASCULAR LEFT PROFUNDA FEMORAL COMPETENT: NORMAL
BH CV LOWER VASCULAR LEFT PROFUNDA FEMORAL COMPRESS: NORMAL
BH CV LOWER VASCULAR LEFT PROFUNDA FEMORAL PHASIC: NORMAL
BH CV LOWER VASCULAR LEFT PROFUNDA FEMORAL SPONT: NORMAL
BH CV LOWER VASCULAR LEFT PROXIMAL FEMORAL AUGMENT: NORMAL
BH CV LOWER VASCULAR LEFT PROXIMAL FEMORAL COMPETENT: NORMAL
BH CV LOWER VASCULAR LEFT PROXIMAL FEMORAL COMPRESS: NORMAL
BH CV LOWER VASCULAR LEFT PROXIMAL FEMORAL PHASIC: NORMAL
BH CV LOWER VASCULAR LEFT PROXIMAL FEMORAL SPONT: NORMAL
BH CV LOWER VASCULAR LEFT SAPHENOFEMORAL JUNCTION AUGMENT: NORMAL
BH CV LOWER VASCULAR LEFT SAPHENOFEMORAL JUNCTION COMPETENT: NORMAL
BH CV LOWER VASCULAR LEFT SAPHENOFEMORAL JUNCTION COMPRESS: NORMAL
BH CV LOWER VASCULAR LEFT SAPHENOFEMORAL JUNCTION PHASIC: NORMAL
BH CV LOWER VASCULAR LEFT SAPHENOFEMORAL JUNCTION SPONT: NORMAL
BH CV LOWER VASCULAR RIGHT COMMON FEMORAL AUGMENT: NORMAL
BH CV LOWER VASCULAR RIGHT COMMON FEMORAL COMPETENT: NORMAL
BH CV LOWER VASCULAR RIGHT COMMON FEMORAL COMPRESS: NORMAL
BH CV LOWER VASCULAR RIGHT COMMON FEMORAL PHASIC: NORMAL
BH CV LOWER VASCULAR RIGHT COMMON FEMORAL SPONT: NORMAL

## 2022-04-07 PROCEDURE — 93971 EXTREMITY STUDY: CPT

## 2022-04-07 PROCEDURE — 93971 EXTREMITY STUDY: CPT | Performed by: INTERNAL MEDICINE

## 2022-04-08 ENCOUNTER — TELEPHONE (OUTPATIENT)
Dept: INTERNAL MEDICINE | Facility: CLINIC | Age: 82
End: 2022-04-08

## 2022-04-08 NOTE — TELEPHONE ENCOUNTER
Caller: Cecile Bennett    Relationship to patient: Self    Best call back number: [phone]    Patient is needing: PATIENT STATED THAT SHE HAS SURGERY NEXT WEEK AND WANTED TO SEE IF SHE GOT HER SECOND COVID BOOSTER WOULD THAT INTERFERE WITH SURGERY AT ALL    PLEASE ADVISE

## 2022-04-12 ENCOUNTER — APPOINTMENT (OUTPATIENT)
Dept: CARDIOLOGY | Facility: HOSPITAL | Age: 82
End: 2022-04-12

## 2022-04-18 ENCOUNTER — ANESTHESIA EVENT (OUTPATIENT)
Dept: PERIOP | Facility: HOSPITAL | Age: 82
End: 2022-04-18

## 2022-04-18 ENCOUNTER — PRE-ADMISSION TESTING (OUTPATIENT)
Dept: PREADMISSION TESTING | Facility: HOSPITAL | Age: 82
End: 2022-04-18

## 2022-04-18 ENCOUNTER — TRANSCRIBE ORDERS (OUTPATIENT)
Dept: ADMINISTRATIVE | Facility: HOSPITAL | Age: 82
End: 2022-04-18

## 2022-04-18 VITALS — WEIGHT: 116.73 LBS | BODY MASS INDEX: 22.92 KG/M2 | HEIGHT: 60 IN

## 2022-04-18 DIAGNOSIS — C50.812 MALIGNANT NEOPLASM OF OVERLAPPING SITES OF LEFT FEMALE BREAST, UNSPECIFIED ESTROGEN RECEPTOR STATUS: Primary | ICD-10-CM

## 2022-04-18 LAB
ALBUMIN SERPL-MCNC: 4.1 G/DL (ref 3.5–5.2)
ALBUMIN/GLOB SERPL: 1.7 G/DL
ALP SERPL-CCNC: 79 U/L (ref 39–117)
ALT SERPL W P-5'-P-CCNC: 14 U/L (ref 1–33)
ANION GAP SERPL CALCULATED.3IONS-SCNC: 9 MMOL/L (ref 5–15)
AST SERPL-CCNC: 28 U/L (ref 1–32)
BILIRUB SERPL-MCNC: 0.5 MG/DL (ref 0–1.2)
BUN SERPL-MCNC: 13 MG/DL (ref 8–23)
BUN/CREAT SERPL: 14.8 (ref 7–25)
CALCIUM SPEC-SCNC: 9.2 MG/DL (ref 8.6–10.5)
CHLORIDE SERPL-SCNC: 101 MMOL/L (ref 98–107)
CO2 SERPL-SCNC: 27 MMOL/L (ref 22–29)
CREAT SERPL-MCNC: 0.88 MG/DL (ref 0.57–1)
DEPRECATED RDW RBC AUTO: 46.8 FL (ref 37–54)
EGFRCR SERPLBLD CKD-EPI 2021: 66.5 ML/MIN/1.73
ERYTHROCYTE [DISTWIDTH] IN BLOOD BY AUTOMATED COUNT: 13.1 % (ref 12.3–15.4)
GLOBULIN UR ELPH-MCNC: 2.4 GM/DL
GLUCOSE SERPL-MCNC: 94 MG/DL (ref 65–99)
HCT VFR BLD AUTO: 44 % (ref 34–46.6)
HGB BLD-MCNC: 14 G/DL (ref 12–15.9)
MCH RBC QN AUTO: 30.8 PG (ref 26.6–33)
MCHC RBC AUTO-ENTMCNC: 31.8 G/DL (ref 31.5–35.7)
MCV RBC AUTO: 96.9 FL (ref 79–97)
PLATELET # BLD AUTO: 117 10*3/MM3 (ref 140–450)
PMV BLD AUTO: 12.9 FL (ref 6–12)
POTASSIUM SERPL-SCNC: 4.2 MMOL/L (ref 3.5–5.2)
PROT SERPL-MCNC: 6.5 G/DL (ref 6–8.5)
QT INTERVAL: 422 MS
QTC INTERVAL: 410 MS
RBC # BLD AUTO: 4.54 10*6/MM3 (ref 3.77–5.28)
SARS-COV-2 RNA PNL SPEC NAA+PROBE: NOT DETECTED
SODIUM SERPL-SCNC: 137 MMOL/L (ref 136–145)
WBC NRBC COR # BLD: 8.75 10*3/MM3 (ref 3.4–10.8)

## 2022-04-18 PROCEDURE — 93005 ELECTROCARDIOGRAM TRACING: CPT

## 2022-04-18 PROCEDURE — 36415 COLL VENOUS BLD VENIPUNCTURE: CPT

## 2022-04-18 PROCEDURE — 93010 ELECTROCARDIOGRAM REPORT: CPT | Performed by: INTERNAL MEDICINE

## 2022-04-18 PROCEDURE — 80053 COMPREHEN METABOLIC PANEL: CPT

## 2022-04-18 PROCEDURE — C9803 HOPD COVID-19 SPEC COLLECT: HCPCS

## 2022-04-18 PROCEDURE — 85027 COMPLETE CBC AUTOMATED: CPT

## 2022-04-18 PROCEDURE — U0004 COV-19 TEST NON-CDC HGH THRU: HCPCS

## 2022-04-18 RX ORDER — DIPHENHYDRAMINE HCL 25 MG
25 CAPSULE ORAL EVERY 6 HOURS PRN
COMMUNITY
End: 2022-10-12

## 2022-04-18 RX ORDER — FAMOTIDINE 10 MG/ML
20 INJECTION, SOLUTION INTRAVENOUS ONCE
Status: CANCELLED | OUTPATIENT
Start: 2022-04-18 | End: 2022-04-18

## 2022-04-18 NOTE — PAT
Patient to apply Chlorhexadine wipes  to surgical area (as instructed) the night before procedure and the AM of procedure. Wipes provided.    Patient instructed to drink 20 ounces (or until full) of Gatorade and it needs to be completed 1 hour (for Main OR patients) or 2 hours (scheduled  section patients) before given arrival time for procedure (NO RED Gatorade)    Patient verbalized understanding.    Patient denies any current skin issues.

## 2022-04-18 NOTE — DISCHARGE INSTRUCTIONS
Patient to apply Chlorhexadine wipes  to surgical area (as instructed) the night before procedure and the AM of procedure. Wipes provided.     Patient instructed to drink 20 ounces (or until full) of Gatorade and it needs to be completed 1 hour (for Main OR patients) or 2 hours (scheduled  section patients) before given arrival time for procedure (NO RED Gatorade)    Patient verbalized understanding.     The following information and instructions were given:    Do not eat, drink, smoke or chew gum after midnight the night before surgery. This includes no mints.  Take all routine, prescribed medications including heart and blood pressure medicines with a sip of water unless otherwise instructed by your physician.   Do NOT take diabetic medication unless instructed by your physician.    DO NOT shave for two days before your procedure.  Do not wear makeup.      DO NOT wear fingernail polish (gel/regular) and/or acrylic/artificial nails on the day of surgery.   If you had a recent manicure and would rather not remove polish or artificial nails, the minimum requirement is that the polish/artificial nails must be removed from the middle finger on each hand.      If you are having surgery/procedure on an upper extremity, fingernail polish/artificial fingernails must be removed for surgery.  NO EXCEPTIONS.      If you are having surgery/procedure on a lower extremity, toenail polish on both extremities must be removed for surgery.  NO EXCEPTIONS.    Remove all jewelry (advise to go to jeweler if unable to remove).  Jewelry, especially rings, can no longer be taped for surgery.    Leave anything you consider valuable at home.    Leave your suitcase in the car until after your surgery.    Bring the following with you the day of your procedure (when applicable):       -Picture ID and insurance cards       -Co-pay/deductible required by insurance       -Medications in the original bottles (not a list) including all  over-the-counter medications if not brought to [facility]       -Copy of advance directive, living will or power of  documents if not brought to [facility]       -CPAP or BIPAP mask and tubing (do not bring machine)       -Skin prep instruction(s) sheet       -[facility]    Education booklet (when applicable), brochure, handout or binder related to procedure given to patient.  Education booklet also includes general information related to their recovery that mentions signs and symptoms of infection and when to call the doctor.    When applicable, an ERAS handout was given to patient.    Respirex use and pneumonia prevention education provided in Pre Admission Testing general education video.    Signs and Symptoms of infection discussed with patient in Pre Admission Testing. Information related to infection and hand hygiene mentioned in Pre Admission Testing general education video. Patient instructed to call their doctor if any of the following symptoms are noted during recovery:  Fever of 100.4 F or higher, incision that is warm or has increasing bleeding, redness or drainage.    DVT Prevention instructions given in general education video presentation during Pre Admission Testing appointment that stress the importance of ambulation to improve blood circulation.  Also encouraged patient to perform foot exercises when in bed and application of a sequential device may be applied to lower extremities to improve circulation.      Please apply Chlorhexidine wipes to surgical area (if instructed) the night before procedure and the AM of procedure and document date/time of applications on skin prep instruction sheet.

## 2022-04-19 ENCOUNTER — HOSPITAL ENCOUNTER (OUTPATIENT)
Facility: HOSPITAL | Age: 82
Discharge: HOME | End: 2022-04-20
Attending: SURGERY | Admitting: SURGERY

## 2022-04-19 ENCOUNTER — ANESTHESIA (OUTPATIENT)
Dept: PERIOP | Facility: HOSPITAL | Age: 82
End: 2022-04-19

## 2022-04-19 ENCOUNTER — HOSPITAL ENCOUNTER (OUTPATIENT)
Dept: NUCLEAR MEDICINE | Facility: HOSPITAL | Age: 82
Discharge: HOME | End: 2022-04-19

## 2022-04-19 ENCOUNTER — ANESTHESIA EVENT CONVERTED (OUTPATIENT)
Dept: ANESTHESIOLOGY | Facility: HOSPITAL | Age: 82
End: 2022-04-19

## 2022-04-19 DIAGNOSIS — C50.812 MALIGNANT NEOPLASM OF OVERLAPPING SITES OF LEFT FEMALE BREAST, UNSPECIFIED ESTROGEN RECEPTOR STATUS: ICD-10-CM

## 2022-04-19 DIAGNOSIS — C50.919 BREAST CANCER: ICD-10-CM

## 2022-04-19 DIAGNOSIS — C50.912 INVASIVE DUCTAL CARCINOMA OF BREAST, FEMALE, LEFT: ICD-10-CM

## 2022-04-19 PROCEDURE — 25010000002 FENTANYL CITRATE (PF) 50 MCG/ML SOLUTION

## 2022-04-19 PROCEDURE — A9541 TC99M SULFUR COLLOID: HCPCS | Performed by: SURGERY

## 2022-04-19 PROCEDURE — A9270 NON-COVERED ITEM OR SERVICE: HCPCS | Performed by: SURGERY

## 2022-04-19 PROCEDURE — 25010000002 DEXAMETHASONE PER 1 MG: Performed by: NURSE ANESTHETIST, CERTIFIED REGISTERED

## 2022-04-19 PROCEDURE — 25010000002 CEFAZOLIN IN DEXTROSE 2-4 GM/100ML-% SOLUTION: Performed by: SURGERY

## 2022-04-19 PROCEDURE — 88332 PATH CONSLTJ SURG EA ADD BLK: CPT | Performed by: PATHOLOGY

## 2022-04-19 PROCEDURE — 88342 IMHCHEM/IMCYTCHM 1ST ANTB: CPT | Performed by: SURGERY

## 2022-04-19 PROCEDURE — 63710000001 ACETAMINOPHEN 500 MG TABLET: Performed by: SURGERY

## 2022-04-19 PROCEDURE — 25010000002 ONDANSETRON PER 1 MG: Performed by: ANESTHESIOLOGY

## 2022-04-19 PROCEDURE — 88307 TISSUE EXAM BY PATHOLOGIST: CPT | Performed by: SURGERY

## 2022-04-19 PROCEDURE — 25010000002 PROPOFOL 10 MG/ML EMULSION: Performed by: NURSE ANESTHETIST, CERTIFIED REGISTERED

## 2022-04-19 PROCEDURE — 19303 MAST SIMPLE COMPLETE: CPT

## 2022-04-19 PROCEDURE — 25010000002 HYDRALAZINE PER 20 MG: Performed by: NURSE ANESTHETIST, CERTIFIED REGISTERED

## 2022-04-19 PROCEDURE — 38792 RA TRACER ID OF SENTINL NODE: CPT

## 2022-04-19 PROCEDURE — 25010000002 FENTANYL CITRATE (PF) 50 MCG/ML SOLUTION: Performed by: NURSE ANESTHETIST, CERTIFIED REGISTERED

## 2022-04-19 PROCEDURE — 0 TECHNETIUM FILTERED SULFUR COLLOID: Performed by: SURGERY

## 2022-04-19 PROCEDURE — 63710000001 METOPROLOL SUCCINATE XL 25 MG TABLET SUSTAINED-RELEASE 24 HOUR: Performed by: SURGERY

## 2022-04-19 PROCEDURE — 25010000002 DEXAMETHASONE SODIUM PHOSPHATE 10 MG/ML SOLUTION: Performed by: NURSE ANESTHETIST, CERTIFIED REGISTERED

## 2022-04-19 RX ORDER — SODIUM CHLORIDE 0.9 % (FLUSH) 0.9 %
10 SYRINGE (ML) INJECTION AS NEEDED
Status: DISCONTINUED | OUTPATIENT
Start: 2022-04-19 | End: 2022-04-19 | Stop reason: HOSPADM

## 2022-04-19 RX ORDER — LIDOCAINE HYDROCHLORIDE 10 MG/ML
INJECTION, SOLUTION EPIDURAL; INFILTRATION; INTRACAUDAL; PERINEURAL AS NEEDED
Status: DISCONTINUED | OUTPATIENT
Start: 2022-04-19 | End: 2022-04-19 | Stop reason: SURG

## 2022-04-19 RX ORDER — ENALAPRILAT 2.5 MG/2ML
1.25 INJECTION INTRAVENOUS EVERY 6 HOURS PRN
Status: DISCONTINUED | OUTPATIENT
Start: 2022-04-19 | End: 2022-04-20 | Stop reason: HOSPADM

## 2022-04-19 RX ORDER — LIDOCAINE HYDROCHLORIDE 10 MG/ML
0.5 INJECTION, SOLUTION EPIDURAL; INFILTRATION; INTRACAUDAL; PERINEURAL ONCE AS NEEDED
Status: COMPLETED | OUTPATIENT
Start: 2022-04-19 | End: 2022-04-19

## 2022-04-19 RX ORDER — ATRACURIUM BESYLATE 10 MG/ML
INJECTION, SOLUTION INTRAVENOUS AS NEEDED
Status: DISCONTINUED | OUTPATIENT
Start: 2022-04-19 | End: 2022-04-19

## 2022-04-19 RX ORDER — CEFAZOLIN SODIUM 2 G/100ML
2 INJECTION, SOLUTION INTRAVENOUS EVERY 8 HOURS
Status: COMPLETED | OUTPATIENT
Start: 2022-04-19 | End: 2022-04-20

## 2022-04-19 RX ORDER — GLYCOPYRROLATE 0.2 MG/ML
INJECTION INTRAMUSCULAR; INTRAVENOUS AS NEEDED
Status: DISCONTINUED | OUTPATIENT
Start: 2022-04-19 | End: 2022-04-19 | Stop reason: SURG

## 2022-04-19 RX ORDER — MAGNESIUM HYDROXIDE 1200 MG/15ML
LIQUID ORAL AS NEEDED
Status: DISCONTINUED | OUTPATIENT
Start: 2022-04-19 | End: 2022-04-19 | Stop reason: HOSPADM

## 2022-04-19 RX ORDER — ACETAMINOPHEN 500 MG
1000 TABLET ORAL EVERY 6 HOURS SCHEDULED
Status: DISCONTINUED | OUTPATIENT
Start: 2022-04-19 | End: 2022-04-20 | Stop reason: HOSPADM

## 2022-04-19 RX ORDER — MIDAZOLAM HYDROCHLORIDE 1 MG/ML
0.5 INJECTION INTRAMUSCULAR; INTRAVENOUS
Status: DISCONTINUED | OUTPATIENT
Start: 2022-04-19 | End: 2022-04-19 | Stop reason: HOSPADM

## 2022-04-19 RX ORDER — HYDRALAZINE HYDROCHLORIDE 20 MG/ML
INJECTION INTRAMUSCULAR; INTRAVENOUS AS NEEDED
Status: DISCONTINUED | OUTPATIENT
Start: 2022-04-19 | End: 2022-04-19 | Stop reason: SURG

## 2022-04-19 RX ORDER — PROMETHAZINE HYDROCHLORIDE 12.5 MG/1
6.25 TABLET ORAL EVERY 6 HOURS PRN
Status: DISCONTINUED | OUTPATIENT
Start: 2022-04-19 | End: 2022-04-20 | Stop reason: HOSPADM

## 2022-04-19 RX ORDER — ROCURONIUM BROMIDE 10 MG/ML
INJECTION, SOLUTION INTRAVENOUS AS NEEDED
Status: DISCONTINUED | OUTPATIENT
Start: 2022-04-19 | End: 2022-04-19 | Stop reason: SURG

## 2022-04-19 RX ORDER — METOPROLOL SUCCINATE 25 MG/1
25 TABLET, EXTENDED RELEASE ORAL NIGHTLY
Status: DISCONTINUED | OUTPATIENT
Start: 2022-04-19 | End: 2022-04-20 | Stop reason: HOSPADM

## 2022-04-19 RX ORDER — DIPHENHYDRAMINE HYDROCHLORIDE 50 MG/ML
12.5 INJECTION INTRAMUSCULAR; INTRAVENOUS EVERY 6 HOURS PRN
Status: DISCONTINUED | OUTPATIENT
Start: 2022-04-19 | End: 2022-04-20 | Stop reason: HOSPADM

## 2022-04-19 RX ORDER — PANTOPRAZOLE SODIUM 40 MG/1
40 TABLET, DELAYED RELEASE ORAL DAILY
Refills: 2 | Status: DISCONTINUED | OUTPATIENT
Start: 2022-04-20 | End: 2022-04-20 | Stop reason: HOSPADM

## 2022-04-19 RX ORDER — ONDANSETRON 2 MG/ML
INJECTION INTRAMUSCULAR; INTRAVENOUS AS NEEDED
Status: DISCONTINUED | OUTPATIENT
Start: 2022-04-19 | End: 2022-04-19 | Stop reason: SURG

## 2022-04-19 RX ORDER — ACETAMINOPHEN 500 MG
1000 TABLET ORAL ONCE
Status: COMPLETED | OUTPATIENT
Start: 2022-04-19 | End: 2022-04-19

## 2022-04-19 RX ORDER — FENTANYL CITRATE 50 UG/ML
INJECTION, SOLUTION INTRAMUSCULAR; INTRAVENOUS
Status: COMPLETED
Start: 2022-04-19 | End: 2022-04-19

## 2022-04-19 RX ORDER — FENTANYL CITRATE 50 UG/ML
50 INJECTION, SOLUTION INTRAMUSCULAR; INTRAVENOUS
Status: DISCONTINUED | OUTPATIENT
Start: 2022-04-19 | End: 2022-04-19 | Stop reason: HOSPADM

## 2022-04-19 RX ORDER — CEFAZOLIN SODIUM 2 G/100ML
2 INJECTION, SOLUTION INTRAVENOUS ONCE
Status: COMPLETED | OUTPATIENT
Start: 2022-04-19 | End: 2022-04-19

## 2022-04-19 RX ORDER — MELOXICAM 15 MG/1
15 TABLET ORAL ONCE
Status: COMPLETED | OUTPATIENT
Start: 2022-04-19 | End: 2022-04-19

## 2022-04-19 RX ORDER — BUPIVACAINE HYDROCHLORIDE 2.5 MG/ML
INJECTION, SOLUTION EPIDURAL; INFILTRATION; INTRACAUDAL
Status: COMPLETED | OUTPATIENT
Start: 2022-04-19 | End: 2022-04-19

## 2022-04-19 RX ORDER — SODIUM CHLORIDE 9 MG/ML
50 INJECTION, SOLUTION INTRAVENOUS CONTINUOUS
Status: DISCONTINUED | OUTPATIENT
Start: 2022-04-19 | End: 2022-04-20

## 2022-04-19 RX ORDER — SODIUM CHLORIDE 0.9 % (FLUSH) 0.9 %
10 SYRINGE (ML) INJECTION EVERY 12 HOURS SCHEDULED
Status: DISCONTINUED | OUTPATIENT
Start: 2022-04-19 | End: 2022-04-19 | Stop reason: HOSPADM

## 2022-04-19 RX ORDER — DEXAMETHASONE SODIUM PHOSPHATE 10 MG/ML
INJECTION, SOLUTION INTRAMUSCULAR; INTRAVENOUS
Status: COMPLETED | OUTPATIENT
Start: 2022-04-19 | End: 2022-04-19

## 2022-04-19 RX ORDER — FAMOTIDINE 20 MG/1
20 TABLET, FILM COATED ORAL ONCE
Status: COMPLETED | OUTPATIENT
Start: 2022-04-19 | End: 2022-04-19

## 2022-04-19 RX ORDER — HYDROMORPHONE HYDROCHLORIDE 1 MG/ML
0.5 INJECTION, SOLUTION INTRAMUSCULAR; INTRAVENOUS; SUBCUTANEOUS
Status: DISCONTINUED | OUTPATIENT
Start: 2022-04-19 | End: 2022-04-19 | Stop reason: HOSPADM

## 2022-04-19 RX ORDER — ONDANSETRON 2 MG/ML
4 INJECTION INTRAMUSCULAR; INTRAVENOUS EVERY 6 HOURS PRN
Status: DISCONTINUED | OUTPATIENT
Start: 2022-04-19 | End: 2022-04-20 | Stop reason: HOSPADM

## 2022-04-19 RX ORDER — SODIUM CHLORIDE, SODIUM LACTATE, POTASSIUM CHLORIDE, CALCIUM CHLORIDE 600; 310; 30; 20 MG/100ML; MG/100ML; MG/100ML; MG/100ML
9 INJECTION, SOLUTION INTRAVENOUS CONTINUOUS
Status: DISCONTINUED | OUTPATIENT
Start: 2022-04-19 | End: 2022-04-20 | Stop reason: HOSPADM

## 2022-04-19 RX ORDER — PREGABALIN 75 MG/1
75 CAPSULE ORAL ONCE
Status: COMPLETED | OUTPATIENT
Start: 2022-04-19 | End: 2022-04-19

## 2022-04-19 RX ORDER — MELOXICAM 7.5 MG/1
15 TABLET ORAL DAILY
Status: DISCONTINUED | OUTPATIENT
Start: 2022-04-20 | End: 2022-04-20 | Stop reason: HOSPADM

## 2022-04-19 RX ORDER — ONDANSETRON 2 MG/ML
4 INJECTION INTRAMUSCULAR; INTRAVENOUS ONCE AS NEEDED
Status: DISCONTINUED | OUTPATIENT
Start: 2022-04-19 | End: 2022-04-19 | Stop reason: HOSPADM

## 2022-04-19 RX ORDER — PROMETHAZINE HYDROCHLORIDE 12.5 MG/1
6.25 SUPPOSITORY RECTAL EVERY 6 HOURS PRN
Status: DISCONTINUED | OUTPATIENT
Start: 2022-04-19 | End: 2022-04-20 | Stop reason: HOSPADM

## 2022-04-19 RX ORDER — HYDROMORPHONE HYDROCHLORIDE 1 MG/ML
0.3 INJECTION, SOLUTION INTRAMUSCULAR; INTRAVENOUS; SUBCUTANEOUS
Status: DISCONTINUED | OUTPATIENT
Start: 2022-04-19 | End: 2022-04-20 | Stop reason: HOSPADM

## 2022-04-19 RX ORDER — PROPOFOL 10 MG/ML
VIAL (ML) INTRAVENOUS AS NEEDED
Status: DISCONTINUED | OUTPATIENT
Start: 2022-04-19 | End: 2022-04-19 | Stop reason: SURG

## 2022-04-19 RX ORDER — DEXAMETHASONE SODIUM PHOSPHATE 4 MG/ML
INJECTION, SOLUTION INTRA-ARTICULAR; INTRALESIONAL; INTRAMUSCULAR; INTRAVENOUS; SOFT TISSUE AS NEEDED
Status: DISCONTINUED | OUTPATIENT
Start: 2022-04-19 | End: 2022-04-19 | Stop reason: SURG

## 2022-04-19 RX ORDER — FENTANYL CITRATE 50 UG/ML
INJECTION, SOLUTION INTRAMUSCULAR; INTRAVENOUS AS NEEDED
Status: DISCONTINUED | OUTPATIENT
Start: 2022-04-19 | End: 2022-04-19 | Stop reason: SURG

## 2022-04-19 RX ORDER — OXYCODONE HYDROCHLORIDE 5 MG/1
5 TABLET ORAL EVERY 4 HOURS PRN
Status: DISCONTINUED | OUTPATIENT
Start: 2022-04-19 | End: 2022-04-20 | Stop reason: HOSPADM

## 2022-04-19 RX ORDER — NALOXONE HCL 0.4 MG/ML
0.1 VIAL (ML) INJECTION
Status: DISCONTINUED | OUTPATIENT
Start: 2022-04-19 | End: 2022-04-20 | Stop reason: HOSPADM

## 2022-04-19 RX ADMIN — DEXAMETHASONE SODIUM PHOSPHATE 4 MG: 10 INJECTION, SOLUTION INTRAMUSCULAR; INTRAVENOUS at 14:26

## 2022-04-19 RX ADMIN — GLYCOPYRROLATE 0.2 MG: 0.2 INJECTION INTRAMUSCULAR; INTRAVENOUS at 15:25

## 2022-04-19 RX ADMIN — LIDOCAINE HYDROCHLORIDE 50 MG: 10 INJECTION, SOLUTION EPIDURAL; INFILTRATION; INTRACAUDAL; PERINEURAL at 14:52

## 2022-04-19 RX ADMIN — ROCURONIUM BROMIDE 10 MG: 10 INJECTION INTRAVENOUS at 15:32

## 2022-04-19 RX ADMIN — ACETAMINOPHEN 1000 MG: 500 TABLET ORAL at 20:17

## 2022-04-19 RX ADMIN — HYDRALAZINE HYDROCHLORIDE 3 MG: 20 INJECTION INTRAMUSCULAR; INTRAVENOUS at 16:17

## 2022-04-19 RX ADMIN — CEFAZOLIN SODIUM 2 G: 2 INJECTION, SOLUTION INTRAVENOUS at 14:58

## 2022-04-19 RX ADMIN — FAMOTIDINE 20 MG: 20 TABLET ORAL at 10:30

## 2022-04-19 RX ADMIN — ACETAMINOPHEN 1000 MG: 500 TABLET ORAL at 10:30

## 2022-04-19 RX ADMIN — CEFAZOLIN SODIUM 2 G: 2 INJECTION, SOLUTION INTRAVENOUS at 21:37

## 2022-04-19 RX ADMIN — ROCURONIUM BROMIDE 40 MG: 10 INJECTION INTRAVENOUS at 14:52

## 2022-04-19 RX ADMIN — HYDRALAZINE HYDROCHLORIDE 2 MG: 20 INJECTION INTRAMUSCULAR; INTRAVENOUS at 15:19

## 2022-04-19 RX ADMIN — TECHNETIUM TC 99M SULFUR COLLOID 1 DOSE: KIT at 15:01

## 2022-04-19 RX ADMIN — METOPROLOL SUCCINATE 25 MG: 25 TABLET, EXTENDED RELEASE ORAL at 21:37

## 2022-04-19 RX ADMIN — ONDANSETRON 4 MG: 2 INJECTION INTRAMUSCULAR; INTRAVENOUS at 16:23

## 2022-04-19 RX ADMIN — MELOXICAM 15 MG: 15 TABLET ORAL at 10:30

## 2022-04-19 RX ADMIN — FENTANYL CITRATE 50 MCG: 50 INJECTION, SOLUTION INTRAMUSCULAR; INTRAVENOUS at 17:01

## 2022-04-19 RX ADMIN — DEXAMETHASONE SODIUM PHOSPHATE 4 MG: 4 INJECTION, SOLUTION INTRA-ARTICULAR; INTRALESIONAL; INTRAMUSCULAR; INTRAVENOUS; SOFT TISSUE at 15:02

## 2022-04-19 RX ADMIN — LIDOCAINE HYDROCHLORIDE 0.5 ML: 10 INJECTION, SOLUTION EPIDURAL; INFILTRATION; INTRACAUDAL; PERINEURAL at 10:30

## 2022-04-19 RX ADMIN — SODIUM CHLORIDE 50 ML/HR: 9 INJECTION, SOLUTION INTRAVENOUS at 20:17

## 2022-04-19 RX ADMIN — FENTANYL CITRATE 50 MCG: 50 INJECTION, SOLUTION INTRAMUSCULAR; INTRAVENOUS at 17:53

## 2022-04-19 RX ADMIN — FENTANYL CITRATE 50 MCG: 50 INJECTION, SOLUTION INTRAMUSCULAR; INTRAVENOUS at 15:42

## 2022-04-19 RX ADMIN — HYDRALAZINE HYDROCHLORIDE 3 MG: 20 INJECTION INTRAMUSCULAR; INTRAVENOUS at 16:42

## 2022-04-19 RX ADMIN — ROCURONIUM BROMIDE 10 MG: 10 INJECTION INTRAVENOUS at 16:03

## 2022-04-19 RX ADMIN — BUPIVACAINE HYDROCHLORIDE 40 ML: 2.5 INJECTION, SOLUTION EPIDURAL; INFILTRATION; INTRACAUDAL at 14:26

## 2022-04-19 RX ADMIN — FENTANYL CITRATE 50 MCG: 50 INJECTION, SOLUTION INTRAMUSCULAR; INTRAVENOUS at 15:12

## 2022-04-19 RX ADMIN — PROPOFOL 100 MG: 10 INJECTION, EMULSION INTRAVENOUS at 14:52

## 2022-04-19 RX ADMIN — SODIUM CHLORIDE, POTASSIUM CHLORIDE, SODIUM LACTATE AND CALCIUM CHLORIDE 9 ML/HR: 600; 310; 30; 20 INJECTION, SOLUTION INTRAVENOUS at 11:13

## 2022-04-19 RX ADMIN — PREGABALIN 75 MG: 75 CAPSULE ORAL at 10:30

## 2022-04-19 NOTE — ANESTHESIA PROCEDURE NOTES
Airway  Urgency: elective    Date/Time: 4/19/2022 2:59 PM  Airway not difficult    General Information and Staff    Patient location during procedure: OR    Indications and Patient Condition  Indications for airway management: airway protection    Preoxygenated: yes  MILS not maintained throughout  Mask difficulty assessment: 1 - vent by mask    Final Airway Details  Final airway type: endotracheal airway      Successful airway: ETT  Cuffed: yes   Successful intubation technique: direct laryngoscopy  Endotracheal tube insertion site: oral  Blade: Amber  Blade size: 3  ETT size (mm): 7.0  Cormack-Lehane Classification: grade I - full view of glottis  Placement verified by: chest auscultation and capnometry   Measured from: lips  ETT/EBT  to lips (cm): 20  Number of attempts at approach: 1  Assessment: lips, teeth, and gum same as pre-op and atraumatic intubation    Additional Comments  Negative epigastric sounds, Breath sound equal bilaterally with symmetric chest rise and fall

## 2022-04-19 NOTE — H&P
Pre-Op H&P  Cecile Bennett  [id number]  1940      Chief complaint: Left breast cancer      Subjective:  Patient is a 82 y.o.female presents for scheduled surgery by Dr. FREY. She anticipates a LEFT SIMPLE  MASTECTOMY, PROPHYLACTIC RIGHT SIMPLE MASTECTOMY, LEFT SENTINEL NODE BIOPSY today.  She had an abnormal screening mammogram 8/9/2021.  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      Review of Systems:  Constitutional-- No fever, chills or sweats. No fatigue.  CV-- No chest pain, palpitation or syncope. +HTN  Resp-- No SOB, cough, hemoptysis  Skin--No rashes or lesions      Allergies:   Allergies   Allergen Reactions   • Latex Unknown - High Severity     Rash with gloves and bandaid   • Metal Unknown - High Severity     Nickel  Causes rash    • Nickel Rash   • Aspirin GI Intolerance     ulcers   • Codeine Nausea And Vomiting         Home Meds:  Medications Prior to Admission   Medication Sig Dispense Refill Last Dose   • Calcium Carb-Cholecalciferol (CALCIUM 1000 + D PO) Take 1 tablet/day by mouth.   4/19/2022 at Unknown time   • diphenhydrAMINE (BENADRYL) 25 mg capsule Take 25 mg by mouth Every 6 (Six) Hours As Needed for Itching or Allergies.   Past Week at Unknown time   • metoprolol succinate XL (TOPROL-XL) 25 MG 24 hr tablet Take 1 tablet by mouth Every Night. 90 tablet 3 4/18/2022 at 2000   • omeprazole (priLOSEC) 20 MG capsule Take 1 capsule by mouth Every Morning. 90 capsule 2 4/17/2022         PMH:   Past Medical History:   Diagnosis Date   • Aortic valve sclerosis    • Breast cancer (HCC) 2009    RIGHT   • History of transfusion 1980    no reaction shadi ky   • Hx of radiation therapy 2009    RT BREAST CANCER   • Hypertension    • Osteoporosis    • Skin cancer     facial forehead     PSH:    Past Surgical History:   Procedure Laterality Date   • BREAST BIOPSY Right 04/27/2009    Stereotactic biopsy   • BREAST LUMPECTOMY  "Right 2009   • COLONOSCOPY     • ECTOPIC PREGNANCY SURGERY     • ENDOSCOPY     • EYE SURGERY      bilateral cataract   • SKIN BIOPSY     • TONSILLECTOMY     • TUBAL ABDOMINAL LIGATION         Immunization History:  Influenza:   Pneumococcal: UTD  Tetanus: Unknown  Covid x3:     Social History:   Tobacco:   Social History     Tobacco Use   Smoking Status Former Smoker   • Packs/day: 1.00   • Years: 15.00   • Pack years: 15.00   • Quit date:    • Years since quittin.3   Smokeless Tobacco Never Used      Alcohol:     Social History     Substance and Sexual Activity   Alcohol Use Yes   • Alcohol/week: 1.0 standard drink   • Types: 1 Glasses of wine per week         Physical Exam:/80 (BP Location: Left arm, Patient Position: Lying)   Pulse 58   Temp 98.1 °F (36.7 °C) (Temporal)   Resp 18   Ht 152.4 cm (60\")   Wt 52.9 kg (116 lb 11.7 oz)   SpO2 98%   BMI 22.80 kg/m²       General Appearance:    Alert, cooperative, no distress, appears stated age   Head:    Normocephalic, without obvious abnormality, atraumatic   Lungs:     Clear to auscultation bilaterally, respirations unlabored    Heart:   Regular rate and rhythm, S1 and S2 normal    Abdomen:    Soft without tenderness   Extremities:   Extremities normal, atraumatic, no cyanosis or edema   Skin:   Skin color, texture, turgor normal, no rashes or lesions   Neurologic:   Grossly intact     Results Review:     LABS:  Lab Results   Component Value Date    WBC 8.75 2022    HGB 14.0 2022    HCT 44.0 2022    MCV 96.9 2022     (L) 2022    NEUTROABS 5.1 2014    GLUCOSE 94 2022    BUN 13 2022    CREATININE 0.88 2022    EGFRIFNONA 62 2021     2022    K 4.2 2022     2022    CO2 27.0 2022    CALCIUM 9.2 2022    ALBUMIN 4.10 2022    AST 28 2022    ALT 14 2022    BILITOT 0.5 2022     Clinical Information    Left breast " 0.9 cm mass at 6:00, question papilloma versus cancer, history of left bloody nipple discharge   Final Diagnosis   1. BREAST, LEFT, MASS AT 6:00, ULTRASOUND-GUIDED NEEDLE CORE BIOPSY:  Invasive ductal carcinoma, [place] combined histologic grade 2       tubule formation: 3 points       nuclear pleomorphism: 2 points       mitotic rate: One-point  ER: Positive  GA: Positive  HER-2/madalyn: Negative  Background intermediate grade DCIS, cribriform type       RADIOLOGY:  Imaging Results (Last 72 Hours)     ** No results found for the last 72 hours. **          I reviewed the patient's new clinical results.    Cancer Staging (if applicable)  Cancer Patient: _x_ yes __no __unknown; If yes, clinical stage I, T:_1_ N:_0_M:_0_,       Impression: Left breast IDC; ER/GA+ HER2-      Plan: LEFT SIMPLE  MASTECTOMY, PROPHYLACTIC RIGHT SIMPLE MASTECTOMY, LEFT SENTINEL NODE BIOPSY      Ana Dia, ADELE   4/19/2022   11:00 EDT

## 2022-04-19 NOTE — OP NOTE
Operative Note    Cecile Bennett  [id number]   1940     Date of Surgery:  4/19/2022    Pre-Operative Diagnosis: Left breast cancer in the lower inner quadrant    Post-Operative Diagnosis: Same    Procedure: Left [place] lymph node injection                       left simple mastectomy                       left deep subfascial sentinel lymph node biopsy                       prophylactic right simple mastectomy       Anesthesia:  General     Surgeon:  Modesto Rodriguez MD    Circulator: Magy Lam RN; Abdiaziz Fair RN  Physician Assistant: Tuan Diallo PA  Scrub Person: Alphonso Wild; Deuce Dominguez  Nursing Assistant: Jessy Staples; Shari Melendez          Estimated Blood Loss: Very minimal    Findings: 2 left sentinel lymph nodes negative for macrometastases    Complications: None      Indication for Procedure: Ms. Patton is a pleasant 82-year-old lady who presented with an abnormal left breast mammogram showing a focus of malignancy in the inner lower quadrant.  Patient has been having bloody nipple discharge as well.  She presents today for the above procedure.    Procedure: Patient was taken to the op room anesthesia placed supine on the table.  Following induction of general endotracheal anesthesia, SCDs were placed.  She received 2 g of Kefzol IV.  491 mCi of radioactive technetium was injected in the left subareolar region.  Anesthesia placed bilateral ultrasound-guided pectoralis nerve blocks.  The breasts, chest, axilla and upper arms were then prepped and draped in a sterile fashion.  Timeout was observed.  We proceeded with the right prophylactic simple mastectomy first which was done via a transverse elliptical incision, encompassing the areola and nipple complex.  A superior than inferior flaps were raised dissecting the breast tissue away from the flaps down to where the muscle was exposed, maintaining adequate thickness of the flaps.  The breast was then removed  of the underlying pectoralis major muscle taking the fascia along with the specimen.  Larger vessels of the breast were ligated with 3-0 silk suture.  The breast was removed as a whole, marked with a silk suture laterally, weighed and sent to pathology for permanent section.  The wound was then irrigated with warm water with clear return fluid noted.  We then proceeded with the left simple mastectomy which was done in a similar fashion.  The cancer was easily palpable in the lower inner quadrant.  I was concerned about the anterior margin and excised and extended anterior margin and marked with a silk suture on the tumor site and sent that separate to pathology.  In the axilla we identified 2 deep subfascial sentinel lymph node that had very high radioactive count.  These were excised and sent to pathology for frozen section and noted to be negative for metastases.  The breast was also removed as a whole, marked with a silk suture laterally, weighed and sent to pathology for permanent section.  No muscle invasion was noted.  The wound was then irrigated with warm water with clear return fluid noted.  15 [place] round Noah-Elam drains were placed on either side percutaneously and anchored to the skin with 2-0 silk suture.  The subcutaneous tissues were approximated with interrupted 3-0 Vicryl sutures and the skin incisions were approximated with staples.  Covaderm dressing applied.  The patient tolerated the procedure well with no complications.  She was extubated and taken to the recovery room in stable condition.  Sponge count and needle count were correct at the end of the procedure.    JIN Hood assisted with retraction, suction, wound closure and dressing application.  His assistance was essential for completion of the case.            Modesto Rodriguez MD  04/19/22  16:38 EDT

## 2022-04-19 NOTE — ANESTHESIA PROCEDURE NOTES
Peripheral Block      Patient reassessed immediately prior to procedure    Patient location during procedure: OR  Reason for block: at surgeon's request and post-op pain management  Performed by  Anesthesiologist: Rubio Joseph MD  Preanesthetic Checklist  Completed: patient identified, IV checked, site marked, risks and benefits discussed, surgical consent, monitors and equipment checked, pre-op evaluation and timeout performed  Prep:  Pt Position: supine  Sterile barriers:cap, gloves, gown and mask  Prep: ChloraPrep  Patient monitoring: blood pressure monitoring, continuous pulse oximetry and EKG  Procedure  Performed under: general  Guidance:ultrasound guided and landmark technique  Images:still images obtained, printed/placed on chart    Laterality:Bilateral  Block Type:PECS I and PECS II  Injection Technique:single-shot  Needle Type:short-bevel  Needle Gauge:20 G  Resistance on Injection: none    Medications Used: dexamethasone sodium phosphate injection, 4 mg  bupivacaine PF (MARCAINE) 0.25 % injection, 40 mL      Medications  Preservative Free Saline:10ml  Comment:Block Injection:  Total volume of LA divided between Right and Left sided blocks         Post Assessment  Injection Assessment: negative aspiration for heme and incremental injection  Patient Tolerance:comfortable throughout block  Complications:no  Additional Notes  The pt. Was placed in the Supine Position and GA was induced     The insertion site was prepped with CHG and Ultrasound guidance with In-Plane techniquewas  a 4inch BBraun 360 degree echogenic needle was visualized.  Normal Saline PSF was  utilized for hydrodissection of tissue. PECS 1 Block- Pectoralis Major and Minor where identified and LA was injected between PMM and PmM at the level of the 3rd Rib(10ml),  PECS 2-  Pectoralis Minor and Serratus muscle where identified and the needle was advanced laterally in-plane with the 4th rib as a backstop, pleura was monitored.  LA was  injected between SA and PmM at the level of 4th rib( 20ml).  LA injection spread was visualized, injection was incremental 1-5ml, normal or low injection pressure, no intravascular injection, no pneumothorax appreciated.  Thank You.

## 2022-04-19 NOTE — ANESTHESIA POSTPROCEDURE EVALUATION
Patient: Cecile Bennett    Procedure Summary     Date: 04/19/22 Room / Location:  [facility] /  [facility]    Anesthesia Start: 1444 Anesthesia Stop: 1653    Procedure: LEFT SIMPLE  MASTECTOMY, PROPHYLACTIC RIGHT SIMPLE MASTECTOMY, LEFT SENTINEL NODE BIOPSY (Bilateral Breast) Diagnosis: Malignant neoplasm of lower-inner quadrant of left female breast    Surgeons: Modesto Rodriguez MD Provider: Rubio Joseph MD    Anesthesia Type: general ASA Status: 3          Anesthesia Type: general    Vitals  Vitals Value Taken Time   /83 04/19/22 1653   Temp 98 °F (36.7 °C) 04/19/22 1653   Pulse 77 04/19/22 1653   Resp 12 04/19/22 1653   SpO2 96 % 04/19/22 1653           Post Anesthesia Care and Evaluation    Patient location during evaluation: PACU  Patient participation: complete - patient participated  Level of consciousness: awake and alert  Pain score: 0  Pain management: adequate  Airway patency: patent  Anesthetic complications: No anesthetic complications  PONV Status: none  Cardiovascular status: hemodynamically stable and acceptable  Respiratory status: nonlabored ventilation, acceptable and nasal cannula  Hydration status: acceptable

## 2022-04-19 NOTE — ANESTHESIA PREPROCEDURE EVALUATION
Anesthesia Evaluation     Patient summary reviewed and Nursing notes reviewed                Airway   Mallampati: II  TM distance: >3 FB  Neck ROM: full  No difficulty expected  Dental - normal exam     Pulmonary - negative pulmonary ROS and normal exam   Cardiovascular - normal exam    (+) hypertension,       Neuro/Psych- negative ROS  GI/Hepatic/Renal/Endo    (+)  GERD,      Musculoskeletal (-) negative ROS    Abdominal  - normal exam    Bowel sounds: normal.   Substance History - negative use     OB/GYN negative ob/gyn ROS         Other      history of cancer (right)      Other Comment: Thrombocytopenia PLT 117K                Anesthesia Plan    ASA 3     general   (Bilateral PECS blocks)  intravenous induction     Anesthetic plan, all risks, benefits, and alternatives have been provided, discussed and informed consent has been obtained with: patient.    Plan discussed with CRNA.        CODE STATUS:

## 2022-04-19 NOTE — BRIEF OP NOTE
BREAST MASTECTOMY WITH SENTINEL NODE BIOPSY  Progress Note    Cecile Bennett  4/19/2022    Pre-op Diagnosis:   Left breast cancer in the lower inner quadrant       Post-Op Diagnosis Codes:     * Malignant neoplasm of lower-inner quadrant of left female breast [C50.312]    Procedure/CPT® Codes:        Procedure(s):  LEFT SIMPLE  MASTECTOMY, PROPHYLACTIC RIGHT SIMPLE MASTECTOMY, LEFT SENTINEL NODE BIOPSY    Surgeon(s):  Modesto Rodriguez MD    Anesthesia: General    Staff:   Circulator: Magy Lam RN; Abdiaziz Fair RN  Physician Assistant: Tuan Diallo PA  Scrub Person: Alphonso Wild; Deuce Dominguez  Nursing Assistant: Jessy Staples; Shari Melendez         Estimated Blood Loss: minimal    Urine Voided: * No values recorded between 4/19/2022  2:44 PM and 4/19/2022  4:25 PM *    Specimens:                Specimens     ID Source Type Tests Collected By Collected At Frozen?    A Breast, Right Tissue · TISSUE PATHOLOGY EXAM   Modesto Rodriguez MD 4/19/22 1604     Description: right breast stitch lateral    Comment: Diagnosis of Breast cancer - left breast    This specimen was not marked as sent.    B Breast, Left Tis left breast cancer in the inner lower quadrant laurita · TISSUE PATHOLOGY EXAM   Modesto Rodriguez MD 4/19/22 1605 Yes    Description: left sentinal lymph node    This specimen was not marked as sent.    C Breast, Left Tissue · TISSUE PATHOLOGY EXAM   Modesto Rodriguez MD 4/19/22 1611     Description: Left breast - left anterior margin, stitch on side of tumor    This specimen was not marked as sent.                Drains: * No LDAs found *    Findings: 2 left sentinel lymph nodes negative for micrometastases    Complications: None          Modesto Rodriguez MD     Date: 4/19/2022  Time: 16:35 EDT

## 2022-04-20 VITALS
BODY MASS INDEX: 22.92 KG/M2 | TEMPERATURE: 96 F | HEART RATE: 71 BPM | WEIGHT: 116.73 LBS | HEIGHT: 60 IN | OXYGEN SATURATION: 95 % | SYSTOLIC BLOOD PRESSURE: 114 MMHG | RESPIRATION RATE: 18 BRPM | DIASTOLIC BLOOD PRESSURE: 59 MMHG

## 2022-04-20 PROCEDURE — A9270 NON-COVERED ITEM OR SERVICE: HCPCS | Performed by: SURGERY

## 2022-04-20 PROCEDURE — 63710000001 PANTOPRAZOLE 40 MG TABLET DELAYED-RELEASE: Performed by: SURGERY

## 2022-04-20 PROCEDURE — 63710000001 ACETAMINOPHEN 500 MG TABLET: Performed by: SURGERY

## 2022-04-20 PROCEDURE — 25010000002 CEFAZOLIN IN DEXTROSE 2-4 GM/100ML-% SOLUTION: Performed by: SURGERY

## 2022-04-20 PROCEDURE — 63710000001 MELOXICAM 7.5 MG TABLET: Performed by: SURGERY

## 2022-04-20 RX ADMIN — CEFAZOLIN SODIUM 2 G: 2 INJECTION, SOLUTION INTRAVENOUS at 05:19

## 2022-04-20 RX ADMIN — ACETAMINOPHEN 1000 MG: 500 TABLET ORAL at 05:19

## 2022-04-20 RX ADMIN — PANTOPRAZOLE SODIUM 40 MG: 40 TABLET, DELAYED RELEASE ORAL at 08:19

## 2022-04-20 RX ADMIN — ACETAMINOPHEN 1000 MG: 500 TABLET ORAL at 01:05

## 2022-04-20 RX ADMIN — MELOXICAM 15 MG: 7.5 TABLET ORAL at 08:19

## 2022-04-20 NOTE — PLAN OF CARE
Goal Outcome Evaluation:Discharged to home for self care.  Daughter in romm & taught drain care with emptying too.

## 2022-04-20 NOTE — PROGRESS NOTES
"Cecile Bennett  1940  [id number]    Surgery Progress Note    Date of visit: 4/20/2022    Subjective: Comfortable with no complaints of pain    Objective:    /59 (BP Location: Right arm, Patient Position: Lying)   Pulse 71   Temp 96 °F (35.6 °C) (Oral)   Resp 18   Ht 152.4 cm (60\")   Wt 52.9 kg (116 lb 11.7 oz)   SpO2 95%   BMI 22.80 kg/m²     Intake/Output Summary (Last 24 hours) at 4/20/2022 0825  Last data filed at 4/20/2022 0700  Gross per 24 hour   Intake 990 ml   Output 1087 ml   Net -97 ml       CV: Regular rate and rhythm  L: normal air entry  BREAST: Bilateral mastectomy dressings are intact and dry  Flaps are viable with no swelling  Adequate Noah-Elam drainage    LABS:    Results from last 7 days   Lab Units 04/18/22  1624   WBC 10*3/mm3 8.75   HEMOGLOBIN g/dL 14.0   HEMATOCRIT % 44.0   PLATELETS 10*3/mm3 117*     Results from last 7 days   Lab Units 04/18/22  1624   SODIUM mmol/L 137   POTASSIUM mmol/L 4.2   CHLORIDE mmol/L 101   CO2 mmol/L 27.0   BUN mg/dL 13   CREATININE mg/dL 0.88   CALCIUM mg/dL 9.2   BILIRUBIN mg/dL 0.5   ALK PHOS U/L 79   ALT (SGPT) U/L 14   AST (SGOT) U/L 28   GLUCOSE mg/dL 94     Results from last 7 days   Lab Units 04/18/22  1624   SODIUM mmol/L 137   POTASSIUM mmol/L 4.2   CHLORIDE mmol/L 101   CO2 mmol/L 27.0   BUN mg/dL 13   CREATININE mg/dL 0.88   GLUCOSE mg/dL 94   CALCIUM mg/dL 9.2     No results found for: LIPASE      Assessment/ Plan: Stable course status post bilateral mastectomies and left sentinel lymph node biopsy secondary to left breast malignancy  Patient is progressing well  Instructed on drain care  Instructions were given to her  Tylenol 650 mg p.o. 3 times daily for 4 days  No need for narcotics  Home today  Follow-up in the cancer clinic in 1 week    Problem List Items Addressed This Visit        Hematology and Neoplasia    Invasive ductal carcinoma of breast, female, left (HCC)      Other Visit Diagnoses     Breast cancer (HCC)  "       Relevant Orders    Tissue Pathology Exam (Completed)            Modesto Rodriguez MD  4/20/2022  08:25 EDT

## 2022-04-20 NOTE — PLAN OF CARE
Problem: Adult Inpatient Plan of Care  Goal: Plan of Care Review  Outcome: Ongoing, Progressing  Flowsheets (Taken 4/20/2022 0357)  Outcome Evaluation: VSS on RA.  Calm and cooperative.  Slept well throughout night.  KAYLIN drains stripped and emptied.  No c/o pain or nausea.  Safety and comfort maintained.  Will continue to monitor.   Goal Outcome Evaluation:              Outcome Evaluation: VSS on RA.  Calm and cooperative.  Slept well throughout night.  KAYLIN drains stripped and emptied.  No c/o pain or nausea.  Safety and comfort maintained.  Will continue to monitor.

## 2022-04-20 NOTE — CASE MANAGEMENT/SOCIAL WORK
Discharge Planning Assessment  [facility]     Patient Name: Cecile Bennett  MRN: [id number]  Today's Date: 4/20/2022    Admit Date: 4/19/2022     Discharge Needs Assessment     Row Name 04/20/22 1106       Living Environment    People in Home alone    Current Living Arrangements home    Primary Care Provided by self    Provides Primary Care For no one    Family Caregiver if Needed child(celia), adult    Able to Return to Prior Arrangements yes       Resource/Environmental Concerns    Resource/Environmental Concerns none       Transition Planning    Patient/Family Anticipates Transition to home    Patient/Family Anticipated Services at Transition none    Transportation Anticipated family or friend will provide       Discharge Needs Assessment    Readmission Within the Last 30 Days no previous admission in last 30 days    Equipment Currently Used at Home none    Concerns to be Addressed no discharge needs identified;denies needs/concerns at this time    Anticipated Changes Related to Illness none    Equipment Needed After Discharge none               Discharge Plan     Row Name 04/20/22 1106       Plan    Plan Home    Patient/Family in Agreement with Plan yes    Plan Comments Met & spoke with Ms Bennett at the bedside. She lives alone in [facility]. At baseline, is ind with ADL's/mobility. Denies DME/HH/Rehab/O2. POA is [facility]. Has a living will. No needs voiced. Plan is home and her daughter, Nuria, will transport.    Final Discharge Disposition Code 01 - home or self-care              Continued Care and Services - Admitted Since 4/19/2022    Coordination has not been started for this encounter.       Expected Discharge Date and Time     Expected Discharge Date Expected Discharge Time    Apr 20, 2022          Demographic Summary     Row Name 04/20/22 1105       General Information    General Information Comments Verified PCP is Dr Oliveros. Primary insurance is [facility]. Has drug  coverage.               Functional Status     Row Name 04/20/22 1106       Functional Status    Usual Activity Tolerance good    Current Activity Tolerance good       Functional Status, IADL    Medications independent    Meal Preparation independent    Housekeeping independent    Laundry independent    Shopping independent               Psychosocial    No documentation.                Abuse/Neglect    No documentation.                Legal    No documentation.                Substance Abuse    No documentation.                Patient Forms    No documentation.                   Tiffanie Bolanos RN

## 2022-04-25 NOTE — PROGRESS NOTES
"  Subjective     PROBLEM LIST:  1. [id number] ER+ (95%) AR+ (95%) Her2 negative (1+) invasive ductal carcinoma of the left breast  A) bilateral mastectomy on 4/19/22.  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  2. History of right breast cancer  3. Hypertension  4. osteoporosis    CHIEF COMPLAINT: breast cancer      HISTORY OF PRESENT ILLNESS:  The patient is a 82 y.o. female, referred for evaluation of a recently diagnosed breast cancer.  She has a history of contralateral breast cancer in 2009.  This was treated with lumpectomy, radiation, and endocrine therapy for 5 years.    She presented with bleeding from her left nipple.  She has undergone bilateral mastectomy.  She says she is recovering well from the surgery.    She has a history of low bone density and has taken alendronate in the past.    REVIEW OF SYSTEMS:  A 14 point review of systems was performed and is negative except as noted above.    Past Medical History:   Diagnosis Date   • Aortic valve sclerosis    • Breast cancer (HCC) 2009    RIGHT   • History of transfusion 1980    no reaction [facility] [place]   • Hx of radiation therapy 2009    RT BREAST CANCER   • Hypertension    • Osteoporosis    • Skin cancer     facial forehead           Objective     /75 Comment: PT is anxious  Pulse 65   Temp 97.1 °F (36.2 °C)   Resp 16   Ht 152.4 cm (60\")   Wt 51.7 kg (114 lb)   SpO2 98%   BMI 22.26 kg/m²   Performance Status:  ECOG score: 0           General: well appearing female in no acute distress  Neuro: alert and oriented  HEENT: sclerae anicteric, oropharynx clear  Extremities: no lower extremity edema  Skin: no rashes, lesions, bruising, or petechiae  Psych: mood and affect appropriate    Lab Results   Component Value Date    WBC 8.75 04/18/2022    HGB 14.0 04/18/2022    HCT 44.0 04/18/2022    MCV 96.9 04/18/2022     (L) 04/18/2022     Lab Results   Component Value Date    GLUCOSE 94 04/18/2022    BUN 13 04/18/2022    " CREATININE 0.88 04/18/2022    EGFRIFNONA 62 07/14/2021    BCR 14.8 04/18/2022    K 4.2 04/18/2022    CO2 27.0 04/18/2022    CALCIUM 9.2 04/18/2022    ALBUMIN 4.10 04/18/2022    AST 28 04/18/2022    ALT 14 04/18/2022                 Assessment/Plan     Cecile Bennett is a 82 y.o. female with a T2N0M0 ER+ Her2 negative IDC of the left breast.    We discussed options for adjuvant therapy.  IV chemotherapy is sometimes used, but given her age and low risk features of her tumor, I think the risks of chemotherapy would outweigh the potential benefits.  We discussed adjuvant endocrine therapy with an aromatase inhibitor.  We reviewed the potential side effects of anastrozole including hot flashes, vaginal dryness, joint pain, decrease in bone density, and mood or sleep disturbance.    She does have a history of osteoporosis so we will repeat a bone density scan.    I will go ahead and send anastrozole to her pharmacy and she can begin taking it in the next few weeks.  She will follow-up in about 3 months to see how she is tolerating it.  If she is doing well then we will see her every 6 months.         A total greater than 60 mins minutes was spent in face to face patient time, examination, counseling, charting, reviewing test results, and reviewing outside records.    Liza Guerra MD    4/27/2022

## 2022-04-26 LAB
BEAKER LAB AP INTRAOPERATIVE CONSULTATION: NORMAL
CYTO UR: NORMAL
LAB AP CASE REPORT: NORMAL
LAB AP CLINICAL INFORMATION: NORMAL
LAB AP SPECIAL STAINS: NORMAL
PATH REPORT.FINAL DX SPEC: NORMAL
PATH REPORT.GROSS SPEC: NORMAL

## 2022-04-27 ENCOUNTER — CONSULT (OUTPATIENT)
Dept: ONCOLOGY | Facility: CLINIC | Age: 82
End: 2022-04-27

## 2022-04-27 VITALS
RESPIRATION RATE: 16 BRPM | HEIGHT: 60 IN | OXYGEN SATURATION: 98 % | TEMPERATURE: 97.1 F | BODY MASS INDEX: 22.38 KG/M2 | SYSTOLIC BLOOD PRESSURE: 157 MMHG | HEART RATE: 65 BPM | DIASTOLIC BLOOD PRESSURE: 75 MMHG | WEIGHT: 114 LBS

## 2022-04-27 DIAGNOSIS — M81.0 AGE-RELATED OSTEOPOROSIS WITHOUT CURRENT PATHOLOGICAL FRACTURE: ICD-10-CM

## 2022-04-27 DIAGNOSIS — C50.912 INVASIVE DUCTAL CARCINOMA OF BREAST, FEMALE, LEFT: Primary | ICD-10-CM

## 2022-04-27 PROCEDURE — 99205 OFFICE O/P NEW HI 60 MIN: CPT | Performed by: INTERNAL MEDICINE

## 2022-04-27 RX ORDER — ANASTROZOLE 1 MG/1
1 TABLET ORAL DAILY
Qty: 30 TABLET | Refills: 11 | Status: SHIPPED | OUTPATIENT
Start: 2022-04-27 | End: 2023-02-07 | Stop reason: SDUPTHER

## 2022-07-14 ENCOUNTER — HOSPITAL ENCOUNTER (OUTPATIENT)
Dept: BONE DENSITY | Facility: HOSPITAL | Age: 82
Discharge: HOME | End: 2022-07-14
Admitting: INTERNAL MEDICINE

## 2022-07-14 DIAGNOSIS — M81.0 AGE-RELATED OSTEOPOROSIS WITHOUT CURRENT PATHOLOGICAL FRACTURE: ICD-10-CM

## 2022-07-14 DIAGNOSIS — C50.912 INVASIVE DUCTAL CARCINOMA OF BREAST, FEMALE, LEFT: ICD-10-CM

## 2022-07-14 PROCEDURE — 77080 DXA BONE DENSITY AXIAL: CPT

## 2022-07-18 ENCOUNTER — OFFICE VISIT (OUTPATIENT)
Dept: INTERNAL MEDICINE | Facility: CLINIC | Age: 82
End: 2022-07-18

## 2022-07-18 ENCOUNTER — LAB (OUTPATIENT)
Dept: LAB | Facility: HOSPITAL | Age: 82
End: 2022-07-18

## 2022-07-18 VITALS
WEIGHT: 115 LBS | HEART RATE: 64 BPM | OXYGEN SATURATION: 98 % | HEIGHT: 60 IN | BODY MASS INDEX: 22.58 KG/M2 | SYSTOLIC BLOOD PRESSURE: 130 MMHG | DIASTOLIC BLOOD PRESSURE: 60 MMHG | TEMPERATURE: 96.9 F

## 2022-07-18 DIAGNOSIS — I10 ESSENTIAL HYPERTENSION: Primary | ICD-10-CM

## 2022-07-18 DIAGNOSIS — R60.0 LEG EDEMA, LEFT: ICD-10-CM

## 2022-07-18 DIAGNOSIS — M81.0 OSTEOPOROSIS, UNSPECIFIED OSTEOPOROSIS TYPE, UNSPECIFIED PATHOLOGICAL FRACTURE PRESENCE: ICD-10-CM

## 2022-07-18 DIAGNOSIS — C50.912 INVASIVE DUCTAL CARCINOMA OF BREAST, FEMALE, LEFT: ICD-10-CM

## 2022-07-18 DIAGNOSIS — Z00.00 ENCOUNTER FOR MEDICARE ANNUAL WELLNESS EXAM: ICD-10-CM

## 2022-07-18 DIAGNOSIS — R80.9 PROTEINURIA, UNSPECIFIED TYPE: ICD-10-CM

## 2022-07-18 DIAGNOSIS — K27.9 PUD (PEPTIC ULCER DISEASE): ICD-10-CM

## 2022-07-18 LAB
ALBUMIN SERPL-MCNC: 4.4 G/DL (ref 3.5–5.2)
ALBUMIN UR-MCNC: 4.2 MG/DL
ALBUMIN/GLOB SERPL: 1.9 G/DL
ALP SERPL-CCNC: 69 U/L (ref 39–117)
ALT SERPL W P-5'-P-CCNC: 16 U/L (ref 1–33)
ANION GAP SERPL CALCULATED.3IONS-SCNC: 11.6 MMOL/L (ref 5–15)
AST SERPL-CCNC: 28 U/L (ref 1–32)
BILIRUB BLD-MCNC: NEGATIVE MG/DL
BILIRUB SERPL-MCNC: 0.6 MG/DL (ref 0–1.2)
BUN SERPL-MCNC: 14 MG/DL (ref 8–23)
BUN/CREAT SERPL: 15.4 (ref 7–25)
CALCIUM SPEC-SCNC: 9.5 MG/DL (ref 8.6–10.5)
CHLORIDE SERPL-SCNC: 104 MMOL/L (ref 98–107)
CLARITY, POC: CLEAR
CO2 SERPL-SCNC: 27.4 MMOL/L (ref 22–29)
COLOR UR: YELLOW
CREAT SERPL-MCNC: 0.91 MG/DL (ref 0.57–1)
CREAT UR-MCNC: 191.6 MG/DL
EGFRCR SERPLBLD CKD-EPI 2021: 63.9 ML/MIN/1.73
EXPIRATION DATE: ABNORMAL
GLOBULIN UR ELPH-MCNC: 2.3 GM/DL
GLUCOSE SERPL-MCNC: 79 MG/DL (ref 65–99)
GLUCOSE UR STRIP-MCNC: NEGATIVE MG/DL
KETONES UR QL: NEGATIVE
LEUKOCYTE EST, POC: ABNORMAL
Lab: ABNORMAL
MICROALBUMIN/CREAT UR: 21.9 MG/G
NITRITE UR-MCNC: NEGATIVE MG/ML
PH UR: 6 [PH] (ref 5–8)
POTASSIUM SERPL-SCNC: 3.7 MMOL/L (ref 3.5–5.2)
PROT SERPL-MCNC: 6.7 G/DL (ref 6–8.5)
PROT UR STRIP-MCNC: ABNORMAL MG/DL
RBC # UR STRIP: ABNORMAL /UL
SODIUM SERPL-SCNC: 143 MMOL/L (ref 136–145)
SP GR UR: 1.02 (ref 1–1.03)
UROBILINOGEN UR QL: NORMAL

## 2022-07-18 PROCEDURE — G0439 PPPS, SUBSEQ VISIT: HCPCS | Performed by: INTERNAL MEDICINE

## 2022-07-18 PROCEDURE — 82306 VITAMIN D 25 HYDROXY: CPT | Performed by: INTERNAL MEDICINE

## 2022-07-18 PROCEDURE — 87086 URINE CULTURE/COLONY COUNT: CPT | Performed by: INTERNAL MEDICINE

## 2022-07-18 PROCEDURE — 84443 ASSAY THYROID STIM HORMONE: CPT | Performed by: INTERNAL MEDICINE

## 2022-07-18 PROCEDURE — 80053 COMPREHEN METABOLIC PANEL: CPT | Performed by: INTERNAL MEDICINE

## 2022-07-18 PROCEDURE — 1160F RVW MEDS BY RX/DR IN RCRD: CPT | Performed by: INTERNAL MEDICINE

## 2022-07-18 PROCEDURE — 82043 UR ALBUMIN QUANTITATIVE: CPT | Performed by: INTERNAL MEDICINE

## 2022-07-18 PROCEDURE — 82570 ASSAY OF URINE CREATININE: CPT | Performed by: INTERNAL MEDICINE

## 2022-07-18 PROCEDURE — 81003 URINALYSIS AUTO W/O SCOPE: CPT | Performed by: INTERNAL MEDICINE

## 2022-07-18 PROCEDURE — 1170F FXNL STATUS ASSESSED: CPT | Performed by: INTERNAL MEDICINE

## 2022-07-18 PROCEDURE — 82607 VITAMIN B-12: CPT | Performed by: INTERNAL MEDICINE

## 2022-07-18 NOTE — PROGRESS NOTES
The ABCs of the Annual Wellness Visit  Subsequent Medicare Wellness Visit    Chief Complaint   Patient presents with   • Annual Exam      Subjective    History of Present Illness:  Cecile Bennett is a 82 y.o. female who presents for a Subsequent Medicare Wellness Visit.    The following portions of the patient's history were reviewed and   updated as appropriate: current medications, past family history, past medical history, past social history, past surgical history and problem list.     Compared to one year ago, the patient feels her physical   health is the same.    Compared to one year ago, the patient feels her mental   health is the same.    Recent Hospitalizations:  She was not admitted to the hospital during the last year.       Current Medical Providers:  Patient Care Team:  Leon Oliveros MD as PCP - General (Internal Medicine)  Luli Moreno APRN as Nurse Practitioner (Nurse Practitioner)  Modesto Rodriguez MD as Consulting Physician (General Surgery)    Outpatient Medications Prior to Visit   Medication Sig Dispense Refill   • anastrozole (ARIMIDEX) 1 MG tablet Take 1 tablet by mouth Daily. 30 tablet 11   • Calcium Carb-Cholecalciferol (CALCIUM 1000 + D PO) Take 1 tablet/day by mouth.     • diphenhydrAMINE (BENADRYL) 25 mg capsule Take 25 mg by mouth Every 6 (Six) Hours As Needed for Itching or Allergies.     • metoprolol succinate XL (TOPROL-XL) 25 MG 24 hr tablet Take 1 tablet by mouth Every Night. 90 tablet 3   • omeprazole (priLOSEC) 20 MG capsule Take 1 capsule by mouth Every Morning. 90 capsule 2     No facility-administered medications prior to visit.       No opioid medication identified on active medication list. I have reviewed chart for other potential  high risk medication/s and harmful drug interactions in the elderly.          Aspirin is not on active medication list.  Aspirin use is not indicated based on review of current medical condition/s. Risk of harm  outweighs potential benefits.  .      Fall Risk Assessment was completed, and patient is at low risk for falls.      Patient Active Problem List   Diagnosis   • Ataxia   • Dizziness   • Numbness and tingling of both feet   • PUD (peptic ulcer disease)   • Seasonal allergic rhinitis due to pollen   • Osteoporosis   • Essential hypertension   • Pharyngitis   • Leg edema, left   • Invasive ductal carcinoma of breast, female, left (HCC)     Advance Care Planning   Advance Directive is not on file.  ACP discussion was held with the patient during this visit. Patient does not have an advance directive, information provided.    Review of Systems   Constitutional: Negative for chills, fatigue, fever and unexpected weight change.   HENT: Negative for congestion, ear pain, hearing loss, rhinorrhea, sinus pressure and trouble swallowing.    Eyes: Negative for discharge and itching.   Respiratory: Negative for chest tightness and shortness of breath.    Cardiovascular: Positive for leg swelling (on left). Negative for chest pain and palpitations.   Gastrointestinal: Negative for abdominal pain, blood in stool, constipation, diarrhea and vomiting.   Endocrine: Negative for polydipsia and polyuria.   Genitourinary: Negative for difficulty urinating, dysuria, enuresis, frequency, hematuria and urgency.        3/22 mammogram   Musculoskeletal: Negative for arthralgias, back pain, gait problem and joint swelling.        Left bunion   Skin: Negative for rash and wound.   Allergic/Immunologic: Negative for immunocompromised state.   Neurological: Negative for dizziness, syncope, weakness, light-headedness, numbness and headaches.   Hematological: Does not bruise/bleed easily.   Psychiatric/Behavioral: Negative for behavioral problems, dysphoric mood and sleep disturbance. The patient is not nervous/anxious.          Objective       Vitals:    07/18/22 0918 07/18/22 0950   BP: 128/72 130/60   BP Location: Left arm    Patient Position:  "Sitting    Pulse: 64    Temp: 96.9 °F (36.1 °C)    TempSrc: Infrared    SpO2: 98%    Weight: 52.2 kg (115 lb)    Height: 152.4 cm (60\")    PainSc: 0-No pain      BMI Readings from Last 1 Encounters:   22 22.46 kg/m²   BMI is within normal parameters. No follow-up required.    Does the patient have evidence of cognitive impairment? No    Physical Exam            HEALTH RISK ASSESSMENT    Smoking Status:  Social History     Tobacco Use   Smoking Status Former Smoker   • Packs/day: 1.00   • Years: 15.00   • Pack years: 15.00   • Quit date:    • Years since quittin.5   Smokeless Tobacco Never Used     Alcohol Consumption:  Social History     Substance and Sexual Activity   Alcohol Use Yes   • Alcohol/week: 1.0 standard drink   • Types: 1 Glasses of wine per week     Fall Risk Screen:    SUE Fall Risk Assessment was completed, and patient is at MODERATE risk for falls. Assessment completed on:2022    Depression Screening:  PHQ-2/PHQ-9 Depression Screening 2022   Retired PHQ-9 Total Score -   Retired Total Score -   Little Interest or Pleasure in Doing Things 0-->not at all   Feeling Down, Depressed or Hopeless 0-->not at all   PHQ-9: Brief Depression Severity Measure Score 0       Health Habits and Functional and Cognitive Screening:  Functional & Cognitive Status 2022   Do you have difficulty preparing food and eating? No   Do you have difficulty bathing yourself, getting dressed or grooming yourself? No   Do you have difficulty using the toilet? No   Do you have difficulty moving around from place to place? No   Do you have trouble with steps or getting out of a bed or a chair? No   Current Diet Other   Dental Exam Up to date   Eye Exam Up to date   Exercise (times per week) 7 times per week   Current Exercises Include Walking   Do you need help using the phone?  No   Are you deaf or do you have serious difficulty hearing?  No   Do you need help with transportation? No   Do you need " help shopping? No   Do you need help preparing meals?  No   Do you need help with housework?  No   Do you need help with laundry? No   Do you need help taking your medications? No   Do you need help managing money? No   Do you ever drive or ride in a car without wearing a seat belt? No   Have you felt unusual stress, anger or loneliness in the last month? No   Who do you live with? Alone   If you need help, do you have trouble finding someone available to you? No   Have you been bothered in the last four weeks by sexual problems? No   Do you have difficulty concentrating, remembering or making decisions? No       Age-appropriate Screening Schedule:  Refer to the list below for future screening recommendations based on patient's age, sex and/or medical conditions. Orders for these recommended tests are listed in the plan section. The patient has been provided with a written plan.    Health Maintenance   Topic Date Due   • TDAP/TD VACCINES (1 - Tdap) Never done   • ZOSTER VACCINE (1 of 2) 08/08/2022 (Originally 3/30/1990)   • INFLUENZA VACCINE  10/01/2022   • DXA SCAN  07/14/2024              Assessment & Plan     CMS Preventative Services Quick Reference  Risk Factors Identified During Encounter  Immunizations Discussed/Encouraged (specific Immunizations; Td and Shingrix  The above risks/problems have been discussed with the patient.  Follow up actions/plans if indicated are seen below in the Assessment/Plan Section.  Pertinent information has been shared with the patient in the After Visit Summary.    Diagnoses and all orders for this visit:    1. Essential hypertension (Primary)  -     TSH  -     POC Urinalysis Dipstick, Automated    2. PUD (peptic ulcer disease)  -     Vitamin B12    3. Invasive ductal carcinoma of breast, female, left (HCC)    4. Leg edema, left  -     TSH    5. Osteoporosis, unspecified osteoporosis type, unspecified pathological fracture presence  -     Comprehensive Metabolic Panel  -      TSH  -     Vitamin D 25 Hydroxy    6. Encounter for Medicare annual wellness exam  -     Comprehensive Metabolic Panel  -     TSH  -     Vitamin D 25 Hydroxy  -     Vitamin B12  -     POC Urinalysis Dipstick, Automated        Follow Up:   Return in about 6 months (around 1/18/2023) for Recheck, with fasting labs.     An After Visit Summary and PPPS were given to the patient.           HTN-cont BB, advised goal of 130/80  PUD-controlled on PPI but reduce to qod  Rhinitis-cont claritin  Osteoporosis-check vit D ,  Advised weight bearing exercise and at least vit D 2000 IU qd  Anemia-labs at goal  Abnormal glucose-counseled on diet, labs at goal     7/19 labs noted and dw patient

## 2022-07-19 LAB
25(OH)D3 SERPL-MCNC: 38.4 NG/ML (ref 30–100)
BACTERIA SPEC AEROBE CULT: NORMAL
TSH SERPL DL<=0.05 MIU/L-ACNC: 2.67 UIU/ML (ref 0.27–4.2)
VIT B12 BLD-MCNC: 446 PG/ML (ref 211–946)

## 2022-08-03 ENCOUNTER — OFFICE VISIT (OUTPATIENT)
Dept: ONCOLOGY | Facility: CLINIC | Age: 82
End: 2022-08-03

## 2022-08-03 VITALS
OXYGEN SATURATION: 98 % | HEART RATE: 56 BPM | RESPIRATION RATE: 16 BRPM | WEIGHT: 113 LBS | TEMPERATURE: 97.7 F | HEIGHT: 60 IN | SYSTOLIC BLOOD PRESSURE: 183 MMHG | DIASTOLIC BLOOD PRESSURE: 66 MMHG | BODY MASS INDEX: 22.19 KG/M2

## 2022-08-03 DIAGNOSIS — C50.912 INVASIVE DUCTAL CARCINOMA OF BREAST, FEMALE, LEFT: Primary | ICD-10-CM

## 2022-08-03 PROCEDURE — 99214 OFFICE O/P EST MOD 30 MIN: CPT | Performed by: NURSE PRACTITIONER

## 2022-08-03 NOTE — PROGRESS NOTES
"      PROBLEM LIST:  1. [id number] ER+ (95%) IN+ (95%) Her2 negative (1+) invasive ductal carcinoma of the left breast  A) bilateral mastectomy on 4/19/22.  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  B) anastrozole started May 2022  2. History of right breast cancer  3. Hypertension  4. osteoporosis    Subjective     CHIEF COMPLAINT: breast cancer    HISTORY OF PRESENT ILLNESS:   Cecile Bennett returns for follow-up.   She began anastrozole early May 2022.  She is tolerating the medication well.  She denies any hot flashes or night sweats.  No joint pain.  No long bone pain.  No cough or dyspnea.  No headaches or diplopia.      Objective      BP (!) 183/66   Pulse 56   Temp 97.7 °F (36.5 °C)   Resp 16   Ht 152.4 cm (60\")   Wt 51.3 kg (113 lb)   SpO2 98%   BMI 22.07 kg/m²    Vitals:    08/03/22 0944   PainSc: 0-No pain               ECOG score: 0             General: well appearing female in no acute distress  Neuro: alert and oriented  HEENT: sclera anicteric, oropharynx clear  Lymphatics: no cervical, supraclavicular, or axillary adenopathy  Cardiovascular: regular rate and rhythm, no murmurs  Lungs: clear to auscultation bilaterally  Breast: Bilateral mastectomy incisions well-healed with no masses or skin changes  Abdomen: soft, nontender, nondistended.  No palpable organomegaly  Extremeties: no lower extremity edema  Skin: no rashes, lesions, bruising, or petechiae  Psych: mood and affect appropriate    I have reexamined the patient and the results are consistent with the previously documented exam. Tata Martínez, ADELE        RECENT LABS:  Lab Results   Component Value Date    WBC 8.75 04/18/2022    HGB 14.0 04/18/2022    HCT 44.0 04/18/2022    MCV 96.9 04/18/2022     (L) 04/18/2022       Lab Results   Component Value Date    GLUCOSE 79 07/18/2022    BUN 14 07/18/2022    CREATININE 0.91 07/18/2022    EGFRIFNONA 62 07/14/2021    BCR 15.4 07/18/2022    K 3.7 " "07/18/2022    CO2 27.4 07/18/2022    CALCIUM 9.5 07/18/2022    ALBUMIN 4.40 07/18/2022    AST 28 07/18/2022    ALT 16 07/18/2022       DEXA Bone Density Axial  Narrative: DUAL-ENERGY X-RAY ABSORPTIOMETRY (DXA)     INDICATION:  Postmenopausal, osteoporosis, cancer     COMPARISON: Previous bone mineral density exam performed on 03/02/2007     PROCEDURE: A DXA scan was performed using a Hologic densitometer.     The lumbar spine L1-L4 was evaluated as well as bilateral total hip.     The T-score compares the patient's bone mineral density with the peak  bone mass of young normal patients.  According to criteria established  by the World Health Organization, patients with T-scores  between 1.0  and 2.5 standard deviations BELOW the mean are osteopenic (low bone  mass).  Patients with T-scores EQUAL TO OR GREATER than 2.5 standard  deviations below the mean are osteoporotic.     The Z-score compares the patient bone mineral density with age and sex  matched peers.  According to the International Society for Clinical  Densitometry's 2007 consensus conference:  In women prior to menopause  and men less than age 50, Z-scores, not T-scores are preferred.  A  Z-score of -2.0 or lower is defined as \"below the expected range for  age\" and a Z-score above -2.0 is \"within the expected range for age.\"   The WHO diagnostic criteria may be applied in women in the menopausal  transition.  Osteoporosis cannot be diagnosed in men under age 50 on the  basis of BMD alone.     TECHNICAL QUALITY:  The study is of good technical quality.     RESULTS:     Lumbar Spine:  The BMD measured in the L1-L4 region is 0.796 g/cm2.  The  average T-score is -2.3.  The Z-score is 0.4.     Total Hip:  The BMD measured at the left total proximal femur is 0.651  g/cm2.  The T-score is -2.4.  The Z-score is -0.3.     Femoral Neck:  The BMD measured at the left femoral neck is 0.536 g/cm2.   The T-score is -2.8.  The Z-score is -0.5.     Total Hip:  The BMD " measured at the right total proximal femur is 0.688  g/cm2.  The T-score is -2.1.  The Z-score is 0.0.     Femoral neck: The BMD measured at the right femoral neck is 0.581 g/cm2.  The T score is -2.4. The Z score is -0.1.     Impression: Osteoporosis of the left femoral neck. Osteopenia of the  L1-L4 vertebrae, right femoral neck, total hips bilaterally.     The ten year fracture risk assessment was not calculated because some  T-scores were at or below -2.5.     All the treatment decisions require clinical judgment and consideration  of individual patient factors, including patient preferences,  co-morbidities, previous drug use, risk factors not captured in the FRAX  model (frailty, falls, vitamin D deficiency, increased bone turnover,  interval significant decline in bone density) and possible under or over  estimation of fracture risk by FRAX. Approaches to reduce osteoporosis  related fracture risk include optimizing calcium and vitamin D status,  appropriate weight bearing exercises and fall-prevention measurements.   The National Osteoporosis Foundation recommends  (http://www.nof.org/hcp/practice/practice-and-clinical-guidelines/clinic  ans-guide) that FDA-approved medical therapies be considered in  postmenopausal women and men aged equal or greater than 50 years with :   a) hip or vertebral (clinical or morphometric) fracture; b) T-score of  -2.5 or less at the spine or hip; c) Ten-year fracture probability by  FRAX of greater than 3% for hip fracture of greater than 20% for major  osteoporotic fracture.       Secondary causes of bone loss should be evaluated if clinically  indicated since the etiology of low BMD cannot be determined by BMD  measurement alone.     FOLLOWUP: Consider repeating the study in 2-3 years to reassess the  patient's status or sooner if there is some new clinical indication.     INTERVAL CHANGE:   There was an increase in bone mineral density of the  L1-L4 vertebrae by 12.8%, and  total left hip by 1.2% when compared to  previous study performed on 03/02/2007. Significant increase in bone  mineral density of the lumbar spine may be due to sclerotic, and/or  arthropathic changes, as the patient has not reported a history with  osteoporosis treatment.        At this facility, the least significant change in the BMD at the left  hip with 95% confidence is 0.506242 gm/cm2 at the hip and 0.310546 g/cm2  at the lumbar spine.     This report was finalized on 7/14/2022 3:50 PM by Willi Saeed.                ASSESSMENT AND PLAN:     Cecile Bennett is a 82 y.o. female  with a T2N0M0 ER+ Her2 negative IDC of the left breast.     She began anastrozole early May 2022 and is tolerating it well.  We will continue this for a minimum of 5 years.    Osteoporosis: Bone density from 7/14/2022 showed osteoporosis.  I discussed with her at length treatment for the osteoporosis including Zometa or Prolia.  In the past she had been on Fosamax for osteoporosis but has been off treatment for several years.  She does plan on having some extensive dental work done over the next few months.  She is going to think about her treatment options and will let us know at her follow-up appointment if she is interested in Zometa or Prolia at that time.  If she is going to require ongoing dental procedures for quite some time we may need to rethink about changing her anastrozole to tamoxifen.  She did take tamoxifen in the past for 5 years and tolerated it well.  She will need a repeat bone density scan July 2024.    Follow-up 6 months.        I spent 31 minutes caring for Cecile on this date of service. This time includes time spent by me in the following activities: preparing for the visit, reviewing tests, obtaining and/or reviewing a separately obtained history, performing a medically appropriate examination and/or evaluation, counseling and educating the patient/family/caregiver and documenting information in  the medical record                  Tata Martínez APRN  [facility] Hematology and Oncology    8/3/2022          CC:

## 2022-08-08 ENCOUNTER — TELEPHONE (OUTPATIENT)
Dept: ONCOLOGY | Facility: CLINIC | Age: 82
End: 2022-08-08

## 2022-08-08 NOTE — TELEPHONE ENCOUNTER
We reviewed treatment options again for osteoporosis including Zometa or Prolia every 6 months.  I have reminded to the patient she should meet with her dentist to develop her dental plan and have dental work completed prior to starting treatment.  She is thinking about talking to her primary care provider after her dental work is completed about restarting Fosamax.

## 2022-08-08 NOTE — TELEPHONE ENCOUNTER
Caller: Cecile Bennett    Relationship: Self    Best call back number: [phone]    Who are you requesting to speak with (clinical staff, provider,  specific staff member): TOMMY ESQUIVEL    What was the call regarding: CECILE WAS IN ON AUG 3, AND THEY DISCUSSED THE BONE DENSITY TEST. CECILE WOULD LIKE TO DISCUSS FURTHER    Do you require a callback: YES

## 2022-09-01 ENCOUNTER — LAB (OUTPATIENT)
Dept: LAB | Facility: HOSPITAL | Age: 82
End: 2022-09-01

## 2022-09-01 ENCOUNTER — OFFICE VISIT (OUTPATIENT)
Dept: INTERNAL MEDICINE | Facility: CLINIC | Age: 82
End: 2022-09-01

## 2022-09-01 VITALS
HEART RATE: 60 BPM | HEIGHT: 60 IN | SYSTOLIC BLOOD PRESSURE: 132 MMHG | WEIGHT: 111 LBS | TEMPERATURE: 96.8 F | BODY MASS INDEX: 21.79 KG/M2 | DIASTOLIC BLOOD PRESSURE: 82 MMHG | OXYGEN SATURATION: 99 %

## 2022-09-01 DIAGNOSIS — R59.0 OCCIPITAL LYMPHADENOPATHY: Primary | ICD-10-CM

## 2022-09-01 LAB — SARS-COV-2 RNA NOSE QL NAA+PROBE: NOT DETECTED

## 2022-09-01 PROCEDURE — 99213 OFFICE O/P EST LOW 20 MIN: CPT | Performed by: PHYSICIAN ASSISTANT

## 2022-09-01 PROCEDURE — U0004 COV-19 TEST NON-CDC HGH THRU: HCPCS

## 2022-09-01 NOTE — PROGRESS NOTES
[facility] PRIMARY CARE  [address]   [facility] [place] [address]  Dept: [phone]  Dept Fax: [phone]  Loc: [phone]  Loc Fax: [phone]    Cecile Bennett  1940    Follow Up Office Visit Note    History of Present Illness:  Patient is an 82-year-old female in today for a lump on the back of her head.  Patient is also had a headaches and left-sided face pain as well as left-sided ear pain.  Symptoms have been going on for about 3 days.  Patient was recently exposed to her daughter and grandchildren who have now have COVID.  Recent CBC was normal.  Overall symptoms are better today.      The following portions of the patient's history were reviewed and updated as appropriate: allergies, current medications, past family history, past medical history, past social history, past surgical history, and problem list.    Medications:    Current Outpatient Medications:   •  anastrozole (ARIMIDEX) 1 MG tablet, Take 1 tablet by mouth Daily., Disp: 30 tablet, Rfl: 11  •  Calcium Carb-Cholecalciferol (CALCIUM 1000 + D PO), Take 1 tablet/day by mouth. Vitamin D 2,000 mg, Disp: , Rfl:   •  diphenhydrAMINE (BENADRYL) 25 mg capsule, Take 25 mg by mouth Every 6 (Six) Hours As Needed for Itching or Allergies., Disp: , Rfl:   •  metoprolol succinate XL (TOPROL-XL) 25 MG 24 hr tablet, Take 1 tablet by mouth Every Night., Disp: 90 tablet, Rfl: 3  •  omeprazole (priLOSEC) 20 MG capsule, Take 1 capsule by mouth Every Morning., Disp: 90 capsule, Rfl: 2    Subjective  Allergies   Allergen Reactions   • Latex Unknown - High Severity     Rash with gloves and bandaid   • Metal Unknown - High Severity     Nickel  Causes rash    • Nickel Rash   • Aspirin GI Intolerance     ulcers   • Bacitracin-Polymyxin B Rash   • Codeine Nausea And Vomiting   • Colloidal Oatmeal Rash   • Hydrocortisone Rash        Past Medical History:   Diagnosis Date   • Aortic valve sclerosis    • Breast  cancer (HCC) 2009    RIGHT   • History of transfusion     no reaction shadi ky   • Hx of radiation therapy 2009    RT BREAST CANCER   • Hypertension    • Osteoporosis    • Skin cancer     facial forehead       Past Surgical History:   Procedure Laterality Date   • BREAST BIOPSY Right 2009    Stereotactic biopsy   • BREAST LUMPECTOMY Right 2009   • COLONOSCOPY     • ECTOPIC PREGNANCY SURGERY     • ENDOSCOPY     • EYE SURGERY      bilateral cataract   • MASTECTOMY W/ SENTINEL NODE BIOPSY Bilateral 2022    Procedure: SIMPLE  MASTECTOMY, PROPHYLACTIC RIGHT SIMPLE MASTECTOMY, LEFT SENTINEL NODE BIOPSY;  Surgeon: Modesto Rodriguez MD;  Location: [facility];  Service: General;  Laterality: Bilateral;   • SKIN BIOPSY     • TONSILLECTOMY     • TUBAL ABDOMINAL LIGATION         Family History   Problem Relation Age of Onset   • Breast cancer Sister 67   • Dementia Brother    • Pancreatic cancer Brother    • Stroke Maternal Grandmother    • Breast cancer Cousin 40   • Ovarian cancer Neg Hx         Social History     Socioeconomic History   • Marital status:    Tobacco Use   • Smoking status: Former Smoker     Packs/day: 1.00     Years: 15.00     Pack years: 15.00     Quit date:      Years since quittin.6   • Smokeless tobacco: Never Used   Vaping Use   • Vaping Use: Never used   Substance and Sexual Activity   • Alcohol use: Yes     Alcohol/week: 1.0 standard drink     Types: 1 Glasses of wine per week   • Drug use: Never   • Sexual activity: Not Currently       Review of Systems   Constitutional: Negative for activity change, chills, fatigue, fever and unexpected weight change.   HENT: Positive for ear pain. Negative for congestion, postnasal drip, sinus pressure and sore throat.    Eyes: Negative for pain, discharge and redness.   Respiratory: Negative for cough, shortness of breath and wheezing.    Cardiovascular: Negative for chest pain, palpitations and leg swelling.  "  Gastrointestinal: Negative for diarrhea, nausea and vomiting.   Endocrine: Negative for cold intolerance and heat intolerance.   Genitourinary: Negative for decreased urine volume and dysuria.   Musculoskeletal: Negative for arthralgias and myalgias.   Skin: Negative for rash and wound.   Neurological: Positive for headaches. Negative for dizziness and light-headedness.   Hematological: Does not bruise/bleed easily.   Psychiatric/Behavioral: Negative for confusion, dysphoric mood and sleep disturbance. The patient is not nervous/anxious.          Objective  Vitals:    09/01/22 1039   BP: 132/82   BP Location: Left arm   Patient Position: Sitting   Pulse: 60   Temp: 96.8 °F (36 °C)   TempSrc: Temporal   SpO2: 99%   Weight: 50.3 kg (111 lb)   Height: 152.4 cm (60\")     Body mass index is 21.68 kg/m².     Physical Exam  Physical Exam  Vitals and nursing note reviewed.   Constitutional:       General: She is not in acute distress.     Appearance: She is not ill-appearing.   HENT:      Head: Normocephalic.      Right Ear: Tympanic membrane, ear canal and external ear normal. There is no impacted cerumen.      Left Ear: Tympanic membrane, ear canal and external ear normal. There is no impacted cerumen.      Nose: No congestion or rhinorrhea.      Mouth/Throat:      Mouth: Mucous membranes are moist.      Pharynx: Oropharynx is clear. No oropharyngeal exudate or posterior oropharyngeal erythema.   Eyes:      General:         Right eye: No discharge.         Left eye: No discharge.      Extraocular Movements: Extraocular movements intact.      Conjunctiva/sclera: Conjunctivae normal.      Pupils: Pupils are equal, round, and reactive to light.   Cardiovascular:      Rate and Rhythm: Normal rate and regular rhythm.      Heart sounds: Normal heart sounds. No murmur heard.    No friction rub. No gallop.   Pulmonary:      Effort: Pulmonary effort is normal. No respiratory distress.      Breath sounds: Normal breath sounds. No " wheezing.   Abdominal:      General: Bowel sounds are normal. There is no distension.      Palpations: Abdomen is soft. There is no mass.      Tenderness: There is no abdominal tenderness.   Musculoskeletal:         General: No swelling. Normal range of motion.      Cervical back: Normal range of motion. No tenderness.      Right lower leg: No edema.      Left lower leg: No edema.   Lymphadenopathy:      Cervical: No cervical adenopathy.   Skin:     Findings: No bruising, erythema or rash.      Comments: Approximately 1 cm in circumference occipital lymph node palpated on exam of the left occipital region   Neurological:      Mental Status: She is oriented to person, place, and time.      Gait: Gait normal.   Psychiatric:         Mood and Affect: Mood normal.         Behavior: Behavior normal.         Thought Content: Thought content normal.         Judgment: Judgment normal.         Diagnostic Data  Procedures    Assessment  Diagnoses and all orders for this visit:    1. Occipital lymphadenopathy (Primary)  -     Cancel: COVID-19,[facility] ROUTINE, NP/OP SWAB IN TRANSPORT MEDIA OR ESWAB 72 HR TAT - Swab, Anterior nasal; Future        Plan    1. Occipital lymphadenopathy (Primary)- symptoms improving overall, obtain COVID swab.  Advised if symptoms do not continue to improve or get worse to call back.      Return if symptoms worsen or fail to improve.    Abdiaziz Chamorro PA-C  09/01/2022

## 2022-09-02 RX ORDER — AMOXICILLIN AND CLAVULANATE POTASSIUM 875; 125 MG/1; MG/1
1 TABLET, FILM COATED ORAL 2 TIMES DAILY
Qty: 20 TABLET | Refills: 0 | Status: SHIPPED | OUTPATIENT
Start: 2022-09-02 | End: 2022-09-12

## 2022-09-06 ENCOUNTER — TELEPHONE (OUTPATIENT)
Dept: INTERNAL MEDICINE | Facility: CLINIC | Age: 82
End: 2022-09-06

## 2022-09-06 NOTE — TELEPHONE ENCOUNTER
Caller: Bennett Cecileria Dunn    Relationship: Self    Best call back number: [phone]    What is the best time to reach you: ANY    Who are you requesting to speak with (clinical staff, provider,  specific staff member): ANY    What was the call regarding: PATIENT IS STATING THAT THE amoxicillin-clavulanate (Augmentin) 875-125 MG per tablet MEDICATION IS MAKING HER SICK, SHE ONLY TOOK TWO DAYS OF THIS MEDICATION. GAVE HER UPSET STOMACH, NAUSEA, AND HORRIBLE HEADACHE. PATIENT WANTS TO TRY A DIFFERENT MEDICATION IF SYMPTOMS RETURN. PATIENT STATES HER EAR FEELS BETTER AND THE NODULE ON HER NECK HAS SUBSIDED AS WELL. PLEASE ALL BACK TO ADVISE.    Do you require a callback: YES PLEASE

## 2022-09-15 ENCOUNTER — APPOINTMENT (OUTPATIENT)
Dept: MAMMOGRAPHY | Facility: HOSPITAL | Age: 82
End: 2022-09-15

## 2022-09-16 ENCOUNTER — OFFICE VISIT (OUTPATIENT)
Dept: INTERNAL MEDICINE | Facility: CLINIC | Age: 82
End: 2022-09-16

## 2022-09-16 VITALS
TEMPERATURE: 96.9 F | HEIGHT: 60 IN | OXYGEN SATURATION: 97 % | SYSTOLIC BLOOD PRESSURE: 130 MMHG | WEIGHT: 108 LBS | BODY MASS INDEX: 21.2 KG/M2 | HEART RATE: 64 BPM | DIASTOLIC BLOOD PRESSURE: 70 MMHG

## 2022-09-16 DIAGNOSIS — M81.0 OSTEOPOROSIS, UNSPECIFIED OSTEOPOROSIS TYPE, UNSPECIFIED PATHOLOGICAL FRACTURE PRESENCE: ICD-10-CM

## 2022-09-16 DIAGNOSIS — K27.9 PUD (PEPTIC ULCER DISEASE): ICD-10-CM

## 2022-09-16 DIAGNOSIS — I10 ESSENTIAL HYPERTENSION: Primary | ICD-10-CM

## 2022-09-16 DIAGNOSIS — C50.912 INVASIVE DUCTAL CARCINOMA OF BREAST, FEMALE, LEFT: ICD-10-CM

## 2022-09-16 PROCEDURE — 99214 OFFICE O/P EST MOD 30 MIN: CPT | Performed by: INTERNAL MEDICINE

## 2022-09-16 PROCEDURE — 85027 COMPLETE CBC AUTOMATED: CPT | Performed by: INTERNAL MEDICINE

## 2022-09-16 PROCEDURE — 84443 ASSAY THYROID STIM HORMONE: CPT | Performed by: INTERNAL MEDICINE

## 2022-09-16 PROCEDURE — 80053 COMPREHEN METABOLIC PANEL: CPT | Performed by: INTERNAL MEDICINE

## 2022-09-16 NOTE — PROGRESS NOTES
Patient is a 82 y.o. female who is here for vertigo.  Chief Complaint   Patient presents with   • Dizziness         HPI:    Here for mgmt of HTN and PUD.  Has not felt good for the past couple of weeks.  Started after starting Augmentin.  Has a metallic taste in her mouth.  Some nausea.  Appetite is poor.  Had diarrhea but better.  No fever or chills.  No abdominal pains.  No dysuria.  Energy level is low.     History:     Patient Active Problem List   Diagnosis   • Ataxia   • Dizziness   • Numbness and tingling of both feet   • PUD (peptic ulcer disease)   • Seasonal allergic rhinitis due to pollen   • Osteoporosis   • Essential hypertension   • Pharyngitis   • Leg edema, left   • Invasive ductal carcinoma of breast, female, left (HCC)       Past Medical History:   Diagnosis Date   • Aortic valve sclerosis    • Breast cancer (HCC) 2009    RIGHT   • History of transfusion 1980    no reaction shadi ky   • Hx of radiation therapy 2009    RT BREAST CANCER   • Hypertension    • Osteoporosis    • Skin cancer     facial forehead       Past Surgical History:   Procedure Laterality Date   • BREAST BIOPSY Right 04/27/2009    Stereotactic biopsy   • BREAST LUMPECTOMY Right 05/26/2009   • COLONOSCOPY     • ECTOPIC PREGNANCY SURGERY     • ENDOSCOPY     • EYE SURGERY      bilateral cataract   • MASTECTOMY W/ SENTINEL NODE BIOPSY Bilateral 4/19/2022    Procedure: SIMPLE  MASTECTOMY, PROPHYLACTIC RIGHT SIMPLE MASTECTOMY, LEFT SENTINEL NODE BIOPSY;  Surgeon: Modesto Rodriguez MD;  Location: [facility];  Service: General;  Laterality: Bilateral;   • SKIN BIOPSY     • TONSILLECTOMY     • TUBAL ABDOMINAL LIGATION         Current Outpatient Medications on File Prior to Visit   Medication Sig   • anastrozole (ARIMIDEX) 1 MG tablet Take 1 tablet by mouth Daily.   • Calcium Carb-Cholecalciferol (CALCIUM 1000 + D PO) Take 1 tablet/day by mouth. Vitamin D 2,000 mg   • diphenhydrAMINE (BENADRYL) 25 mg capsule Take 25 mg by mouth Every  6 (Six) Hours As Needed for Itching or Allergies.   • metoprolol succinate XL (TOPROL-XL) 25 MG 24 hr tablet Take 1 tablet by mouth Every Night.   • omeprazole (priLOSEC) 20 MG capsule Take 1 capsule by mouth Every Morning.     No current facility-administered medications on file prior to visit.       Family History   Problem Relation Age of Onset   • Breast cancer Sister 67   • Dementia Brother    • Pancreatic cancer Brother    • Stroke Maternal Grandmother    • Breast cancer Cousin 40   • Ovarian cancer Neg Hx        Social History     Socioeconomic History   • Marital status:    Tobacco Use   • Smoking status: Former Smoker     Packs/day: 1.00     Years: 15.00     Pack years: 15.00     Quit date:      Years since quittin.7   • Smokeless tobacco: Never Used   Vaping Use   • Vaping Use: Never used   Substance and Sexual Activity   • Alcohol use: Yes     Alcohol/week: 1.0 standard drink     Types: 1 Glasses of wine per week   • Drug use: Never   • Sexual activity: Not Currently         Review of Systems   Constitutional: Positive for appetite change and fatigue. Negative for chills, fever and unexpected weight change.   HENT: Negative for congestion, ear pain, hearing loss, rhinorrhea, sinus pressure and trouble swallowing.    Eyes: Negative for discharge and itching.   Respiratory: Negative for chest tightness and shortness of breath.    Cardiovascular: Positive for leg swelling (on left). Negative for chest pain and palpitations.   Gastrointestinal: Positive for nausea. Negative for abdominal pain, blood in stool, constipation, diarrhea and vomiting.   Endocrine: Negative for polydipsia and polyuria.   Genitourinary: Negative for difficulty urinating, dysuria, enuresis, frequency, hematuria and urgency.        3/22 mammogram   Musculoskeletal: Negative for arthralgias, back pain, gait problem and joint swelling.        Left bunion   Skin: Negative for rash and wound.   Allergic/Immunologic:  "Negative for immunocompromised state.   Neurological: Negative for dizziness, syncope, weakness, light-headedness, numbness and headaches.   Hematological: Does not bruise/bleed easily.   Psychiatric/Behavioral: Negative for behavioral problems, dysphoric mood and sleep disturbance. The patient is not nervous/anxious.        /70   Pulse 64   Temp 96.9 °F (36.1 °C) (Infrared)   Ht 152.4 cm (60\")   Wt 49 kg (108 lb)   SpO2 97%   BMI 21.09 kg/m²       Physical Exam  Constitutional:       Appearance: Normal appearance. She is well-developed.   HENT:      Head: Normocephalic and atraumatic.      Right Ear: External ear normal.      Left Ear: External ear normal.      Nose: Nose normal.      Mouth/Throat:      Mouth: Mucous membranes are moist.      Pharynx: Oropharynx is clear.   Eyes:      Extraocular Movements: Extraocular movements intact.      Conjunctiva/sclera: Conjunctivae normal.      Pupils: Pupils are equal, round, and reactive to light.   Cardiovascular:      Rate and Rhythm: Normal rate and regular rhythm.      Heart sounds: Normal heart sounds.   Pulmonary:      Effort: Pulmonary effort is normal.      Breath sounds: Normal breath sounds.   Abdominal:      General: Bowel sounds are normal.      Palpations: Abdomen is soft.   Musculoskeletal:         General: Normal range of motion.      Cervical back: Normal range of motion and neck supple.      Left lower leg: Edema present.   Lymphadenopathy:      Cervical: No cervical adenopathy.   Skin:     General: Skin is warm and dry.   Neurological:      General: No focal deficit present.      Mental Status: She is alert and oriented to person, place, and time.   Psychiatric:         Mood and Affect: Mood normal.         Behavior: Behavior normal.         Thought Content: Thought content normal.         Procedure:      Discussion/Summary:    HTN-cont BB, advised goal of 130/80  PUD-controlled on PPI   Rhinitis-cont claritin  Osteoporosis-Advised weight " bearing exercise and at least vit D 2000 IU qd  Anemia-recheck at goal  Abnormal glucose-counseled on diet  Breast cancer-cont Arimidex     9/16 labs noted and dw patient    Current Outpatient Medications:   •  anastrozole (ARIMIDEX) 1 MG tablet, Take 1 tablet by mouth Daily., Disp: 30 tablet, Rfl: 11  •  Calcium Carb-Cholecalciferol (CALCIUM 1000 + D PO), Take 1 tablet/day by mouth. Vitamin D 2,000 mg, Disp: , Rfl:   •  diphenhydrAMINE (BENADRYL) 25 mg capsule, Take 25 mg by mouth Every 6 (Six) Hours As Needed for Itching or Allergies., Disp: , Rfl:   •  metoprolol succinate XL (TOPROL-XL) 25 MG 24 hr tablet, Take 1 tablet by mouth Every Night., Disp: 90 tablet, Rfl: 3  •  omeprazole (priLOSEC) 20 MG capsule, Take 1 capsule by mouth Every Morning., Disp: 90 capsule, Rfl: 2        Diagnoses and all orders for this visit:    1. Essential hypertension (Primary)  -     CBC (No Diff)  -     Comprehensive Metabolic Panel  -     TSH    2. PUD (peptic ulcer disease)    3. Invasive ductal carcinoma of breast, female, left (HCC)    4. Osteoporosis, unspecified osteoporosis type, unspecified pathological fracture presence

## 2022-09-17 LAB
ALBUMIN SERPL-MCNC: 4.5 G/DL (ref 3.5–5.2)
ALBUMIN/GLOB SERPL: 1.7 G/DL
ALP SERPL-CCNC: 71 U/L (ref 39–117)
ALT SERPL W P-5'-P-CCNC: 21 U/L (ref 1–33)
ANION GAP SERPL CALCULATED.3IONS-SCNC: 11.9 MMOL/L (ref 5–15)
AST SERPL-CCNC: 39 U/L (ref 1–32)
BILIRUB SERPL-MCNC: 0.8 MG/DL (ref 0–1.2)
BUN SERPL-MCNC: 14 MG/DL (ref 8–23)
BUN/CREAT SERPL: 14.4 (ref 7–25)
CALCIUM SPEC-SCNC: 10.2 MG/DL (ref 8.6–10.5)
CHLORIDE SERPL-SCNC: 103 MMOL/L (ref 98–107)
CO2 SERPL-SCNC: 26.1 MMOL/L (ref 22–29)
CREAT SERPL-MCNC: 0.97 MG/DL (ref 0.57–1)
DEPRECATED RDW RBC AUTO: 43.3 FL (ref 37–54)
EGFRCR SERPLBLD CKD-EPI 2021: 59.2 ML/MIN/1.73
ERYTHROCYTE [DISTWIDTH] IN BLOOD BY AUTOMATED COUNT: 13.2 % (ref 12.3–15.4)
GLOBULIN UR ELPH-MCNC: 2.6 GM/DL
GLUCOSE SERPL-MCNC: 83 MG/DL (ref 65–99)
HCT VFR BLD AUTO: 40.1 % (ref 34–46.6)
HGB BLD-MCNC: 13.5 G/DL (ref 12–15.9)
MCH RBC QN AUTO: 30.3 PG (ref 26.6–33)
MCHC RBC AUTO-ENTMCNC: 33.7 G/DL (ref 31.5–35.7)
MCV RBC AUTO: 89.9 FL (ref 79–97)
PLATELET # BLD AUTO: 159 10*3/MM3 (ref 140–450)
PMV BLD AUTO: 12.6 FL (ref 6–12)
POTASSIUM SERPL-SCNC: 4.4 MMOL/L (ref 3.5–5.2)
PROT SERPL-MCNC: 7.1 G/DL (ref 6–8.5)
RBC # BLD AUTO: 4.46 10*6/MM3 (ref 3.77–5.28)
SODIUM SERPL-SCNC: 141 MMOL/L (ref 136–145)
TSH SERPL DL<=0.05 MIU/L-ACNC: 2.93 UIU/ML (ref 0.27–4.2)
WBC NRBC COR # BLD: 7.38 10*3/MM3 (ref 3.4–10.8)

## 2022-09-19 ENCOUNTER — TELEPHONE (OUTPATIENT)
Dept: INTERNAL MEDICINE | Facility: CLINIC | Age: 82
End: 2022-09-19

## 2022-09-19 NOTE — TELEPHONE ENCOUNTER
PT CALLED STATING THAT  HAD SPOKEN TO HER SISTER OVER THE PHONE REGARDING LAB TEST RESULTS FROM 9/16/22. PT IS ASKING IF  WOULD LIKE TO SEE HER IN OFFICE AS A FOLLOW UP TO THOSE LAB RESULTS. IF NOT, PT WOULD LIKE TO CANCEL HER APPOINTMENT ON 10/12/22.

## 2022-10-12 ENCOUNTER — OFFICE VISIT (OUTPATIENT)
Dept: INTERNAL MEDICINE | Facility: CLINIC | Age: 82
End: 2022-10-12

## 2022-10-12 VITALS — HEIGHT: 60 IN | BODY MASS INDEX: 21.09 KG/M2

## 2022-10-12 DIAGNOSIS — Z23 NEED FOR INFLUENZA VACCINATION: Primary | ICD-10-CM

## 2022-10-12 PROCEDURE — 90662 IIV NO PRSV INCREASED AG IM: CPT | Performed by: INTERNAL MEDICINE

## 2022-10-12 PROCEDURE — G0008 ADMIN INFLUENZA VIRUS VAC: HCPCS | Performed by: INTERNAL MEDICINE

## 2022-10-12 NOTE — PROGRESS NOTES
Patient is a 82 y.o. female who is here for a follow up of hypertension.  Chief Complaint   Patient presents with   • Hypertension         HPI:      History:     Patient Active Problem List   Diagnosis   • Ataxia   • Dizziness   • Numbness and tingling of both feet   • PUD (peptic ulcer disease)   • Seasonal allergic rhinitis due to pollen   • Osteoporosis   • Essential hypertension   • Pharyngitis   • Leg edema, left   • Invasive ductal carcinoma of breast, female, left (HCC)       Past Medical History:   Diagnosis Date   • Aortic valve sclerosis    • Breast cancer (HCC) 2009    RIGHT   • History of transfusion 1980    no reaction shadi ky   • Hx of radiation therapy 2009    RT BREAST CANCER   • Hypertension    • Osteoporosis    • Skin cancer     facial forehead       Past Surgical History:   Procedure Laterality Date   • BREAST BIOPSY Right 04/27/2009    Stereotactic biopsy   • BREAST LUMPECTOMY Right 05/26/2009   • COLONOSCOPY     • ECTOPIC PREGNANCY SURGERY     • ENDOSCOPY     • EYE SURGERY      bilateral cataract   • MASTECTOMY W/ SENTINEL NODE BIOPSY Bilateral 4/19/2022    Procedure: SIMPLE  MASTECTOMY, PROPHYLACTIC RIGHT SIMPLE MASTECTOMY, LEFT SENTINEL NODE BIOPSY;  Surgeon: Modesto Rodriguez MD;  Location: [facility];  Service: General;  Laterality: Bilateral;   • SKIN BIOPSY     • TONSILLECTOMY     • TUBAL ABDOMINAL LIGATION         Current Outpatient Medications on File Prior to Visit   Medication Sig   • anastrozole (ARIMIDEX) 1 MG tablet Take 1 tablet by mouth Daily.   • Calcium Carb-Cholecalciferol (CALCIUM 1000 + D PO) Take 1 tablet/day by mouth. Vitamin D 2,000 mg   • diphenhydrAMINE (BENADRYL) 25 mg capsule Take 25 mg by mouth Every 6 (Six) Hours As Needed for Itching or Allergies.   • metoprolol succinate XL (TOPROL-XL) 25 MG 24 hr tablet Take 1 tablet by mouth Every Night.   • omeprazole (priLOSEC) 20 MG capsule Take 1 capsule by mouth Every Morning.     No current facility-administered  "medications on file prior to visit.       Family History   Problem Relation Age of Onset   • Breast cancer Sister 67   • Dementia Brother    • Pancreatic cancer Brother    • Stroke Maternal Grandmother    • Breast cancer Cousin 40   • Ovarian cancer Neg Hx        Social History     Socioeconomic History   • Marital status:    Tobacco Use   • Smoking status: Former     Packs/day: 1.00     Years: 15.00     Pack years: 15.00     Types: Cigarettes     Quit date:      Years since quittin.8   • Smokeless tobacco: Never   Vaping Use   • Vaping Use: Never used   Substance and Sexual Activity   • Alcohol use: Yes     Alcohol/week: 1.0 standard drink     Types: 1 Glasses of wine per week   • Drug use: Never   • Sexual activity: Not Currently         Review of Systems    Ht 152.4 cm (60\")   BMI 21.09 kg/m²       Physical Exam    Procedure:      Discussion/Summary:        Current Outpatient Medications:   •  anastrozole (ARIMIDEX) 1 MG tablet, Take 1 tablet by mouth Daily., Disp: 30 tablet, Rfl: 11  •  Calcium Carb-Cholecalciferol (CALCIUM 1000 + D PO), Take 1 tablet/day by mouth. Vitamin D 2,000 mg, Disp: , Rfl:   •  diphenhydrAMINE (BENADRYL) 25 mg capsule, Take 25 mg by mouth Every 6 (Six) Hours As Needed for Itching or Allergies., Disp: , Rfl:   •  metoprolol succinate XL (TOPROL-XL) 25 MG 24 hr tablet, Take 1 tablet by mouth Every Night., Disp: 90 tablet, Rfl: 3  •  omeprazole (priLOSEC) 20 MG capsule, Take 1 capsule by mouth Every Morning., Disp: 90 capsule, Rfl: 2        There are no diagnoses linked to this encounter.    "

## 2022-10-24 ENCOUNTER — TELEPHONE (OUTPATIENT)
Dept: ONCOLOGY | Facility: CLINIC | Age: 82
End: 2022-10-24

## 2022-10-24 NOTE — TELEPHONE ENCOUNTER
Caller: Cecile Bennett    Relationship: Self    Best call back number: [phone]    What is the best time to reach you: ANYTIME    Who are you requesting to speak with (clinical staff, provider,  specific staff member): DR MARRERO OR NURSE    What was the call regarding: PT IS GOING TO SEE A DENTIST A HAS A MED QUESTION.    PLEASE CALL TO [facility]VISE.     Do you require a callback: YES

## 2022-10-24 NOTE — TELEPHONE ENCOUNTER
I talked with patient and she had questions about the meds Dodie had discussed for her osteoporosis.  I told her the names of prolia and zometa and I told her it would be every 6 months.  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  I also told her to let us know at least a couple of weeks in advance of her Feb appt so I could have auth team send to her insurance for approval.  She verbalized understanding.

## 2022-11-08 DIAGNOSIS — C50.912 INVASIVE DUCTAL CARCINOMA OF BREAST, FEMALE, LEFT: Primary | ICD-10-CM

## 2022-12-19 ENCOUNTER — TELEPHONE (OUTPATIENT)
Dept: ONCOLOGY | Facility: CLINIC | Age: 82
End: 2022-12-19

## 2022-12-19 NOTE — TELEPHONE ENCOUNTER
Caller: Cecile Bennett    Relationship: Self    Best call back number: [phone]        What is the best time to reach you: ANYTIME     OR CAN LEAVE VOICEMAIL IF UNABLE TO REACH       Who are you requesting to speak with (clinical staff, provider,  specific staff member): DR MARRERO OR HER NURSE      What was the call regarding:     NEEDING A LETTER FROM DR MARRERO OFFICE TO SAY CONDITION IS A MEDICAL NECESSITY FOR A [facility]  DENTAL OFFICE FOR A PROCEDURE  SCHEDULED JAN 4TH     THERE PHONE NUMBER IS : [phone]    THERE FAX : [phone]    PLEASE CALL ONCE SENT OR TO DISCUSS         Do you require a callback: YES

## 2022-12-19 NOTE — TELEPHONE ENCOUNTER
Caller: Cecile Bennett    Relationship: Self    Best call back number: [phone]      Who are you requesting to speak with (clinical staff, provider,  specific staff member): clinical    What was the call regarding: CALLING TO VERIFY IF LETTER WILL BE SENT AS MEDICAL OR DENTAL    Do you require a callback: YES

## 2022-12-28 ENCOUNTER — LAB (OUTPATIENT)
Dept: LAB | Facility: HOSPITAL | Age: 82
End: 2022-12-28
Payer: MEDICARE

## 2022-12-28 ENCOUNTER — HOSPITAL ENCOUNTER (OUTPATIENT)
Dept: GENERAL RADIOLOGY | Facility: HOSPITAL | Age: 82
Discharge: HOME | End: 2022-12-28
Payer: MEDICARE

## 2022-12-28 ENCOUNTER — OFFICE VISIT (OUTPATIENT)
Dept: INTERNAL MEDICINE | Facility: CLINIC | Age: 82
End: 2022-12-28

## 2022-12-28 VITALS
HEART RATE: 62 BPM | WEIGHT: 109.6 LBS | BODY MASS INDEX: 21.52 KG/M2 | OXYGEN SATURATION: 99 % | HEIGHT: 60 IN | RESPIRATION RATE: 16 BRPM | SYSTOLIC BLOOD PRESSURE: 122 MMHG | DIASTOLIC BLOOD PRESSURE: 78 MMHG | TEMPERATURE: 96.8 F

## 2022-12-28 DIAGNOSIS — Z01.810 ENCOUNTER FOR PRE-OPERATIVE CARDIOVASCULAR CLEARANCE: Primary | ICD-10-CM

## 2022-12-28 DIAGNOSIS — Z01.810 ENCOUNTER FOR PRE-OPERATIVE CARDIOVASCULAR CLEARANCE: ICD-10-CM

## 2022-12-28 LAB
ALBUMIN SERPL-MCNC: 4.3 G/DL (ref 3.5–5.2)
ALBUMIN/GLOB SERPL: 1.9 G/DL
ALP SERPL-CCNC: 77 U/L (ref 39–117)
ALT SERPL W P-5'-P-CCNC: 18 U/L (ref 1–33)
ANION GAP SERPL CALCULATED.3IONS-SCNC: 9 MMOL/L (ref 5–15)
AST SERPL-CCNC: 36 U/L (ref 1–32)
BASOPHILS # BLD AUTO: 0.04 10*3/MM3 (ref 0–0.2)
BASOPHILS NFR BLD AUTO: 0.6 % (ref 0–1.5)
BILIRUB SERPL-MCNC: 0.6 MG/DL (ref 0–1.2)
BUN SERPL-MCNC: 14 MG/DL (ref 8–23)
BUN/CREAT SERPL: 15.1 (ref 7–25)
CALCIUM SPEC-SCNC: 9.9 MG/DL (ref 8.6–10.5)
CHLORIDE SERPL-SCNC: 105 MMOL/L (ref 98–107)
CO2 SERPL-SCNC: 28 MMOL/L (ref 22–29)
CREAT SERPL-MCNC: 0.93 MG/DL (ref 0.57–1)
DEPRECATED RDW RBC AUTO: 42.8 FL (ref 37–54)
EGFRCR SERPLBLD CKD-EPI 2021: 62.3 ML/MIN/1.73
EOSINOPHIL # BLD AUTO: 0.28 10*3/MM3 (ref 0–0.4)
EOSINOPHIL NFR BLD AUTO: 4 % (ref 0.3–6.2)
ERYTHROCYTE [DISTWIDTH] IN BLOOD BY AUTOMATED COUNT: 12.9 % (ref 12.3–15.4)
GLOBULIN UR ELPH-MCNC: 2.3 GM/DL
GLUCOSE SERPL-MCNC: 84 MG/DL (ref 65–99)
HCT VFR BLD AUTO: 39.2 % (ref 34–46.6)
HGB BLD-MCNC: 13.4 G/DL (ref 12–15.9)
IMM GRANULOCYTES # BLD AUTO: 0.01 10*3/MM3 (ref 0–0.05)
IMM GRANULOCYTES NFR BLD AUTO: 0.1 % (ref 0–0.5)
LYMPHOCYTES # BLD AUTO: 2.46 10*3/MM3 (ref 0.7–3.1)
LYMPHOCYTES NFR BLD AUTO: 35.2 % (ref 19.6–45.3)
MCH RBC QN AUTO: 31.2 PG (ref 26.6–33)
MCHC RBC AUTO-ENTMCNC: 34.2 G/DL (ref 31.5–35.7)
MCV RBC AUTO: 91.4 FL (ref 79–97)
MONOCYTES # BLD AUTO: 0.71 10*3/MM3 (ref 0.1–0.9)
MONOCYTES NFR BLD AUTO: 10.2 % (ref 5–12)
NEUTROPHILS NFR BLD AUTO: 3.48 10*3/MM3 (ref 1.7–7)
NEUTROPHILS NFR BLD AUTO: 49.9 % (ref 42.7–76)
NRBC BLD AUTO-RTO: 0 /100 WBC (ref 0–0.2)
PLATELET # BLD AUTO: 165 10*3/MM3 (ref 140–450)
PMV BLD AUTO: 12.3 FL (ref 6–12)
POTASSIUM SERPL-SCNC: 4.3 MMOL/L (ref 3.5–5.2)
PROT SERPL-MCNC: 6.6 G/DL (ref 6–8.5)
RBC # BLD AUTO: 4.29 10*6/MM3 (ref 3.77–5.28)
SODIUM SERPL-SCNC: 142 MMOL/L (ref 136–145)
WBC NRBC COR # BLD: 6.98 10*3/MM3 (ref 3.4–10.8)

## 2022-12-28 PROCEDURE — 80053 COMPREHEN METABOLIC PANEL: CPT | Performed by: PHYSICIAN ASSISTANT

## 2022-12-28 PROCEDURE — 85025 COMPLETE CBC W/AUTO DIFF WBC: CPT | Performed by: PHYSICIAN ASSISTANT

## 2022-12-28 PROCEDURE — 99213 OFFICE O/P EST LOW 20 MIN: CPT | Performed by: PHYSICIAN ASSISTANT

## 2022-12-28 PROCEDURE — 36415 COLL VENOUS BLD VENIPUNCTURE: CPT | Performed by: PHYSICIAN ASSISTANT

## 2022-12-28 PROCEDURE — 71046 X-RAY EXAM CHEST 2 VIEWS: CPT

## 2022-12-28 NOTE — PROGRESS NOTES
[facility] PRIMARY CARE  [address]   [facility] [address]  Dept: [phone]  Dept Fax: [phone]  Loc: [phone]  Loc Fax: [phone]    Cecile Bennett  1940    Follow Up Office Visit Note    History of Present Illness:  Patient is an 82-year-old female in today for preoperative clearance for dental surgery.  Supposed to have all her teeth extracted.  Needing cardiac clearance and referral to cardiology for this.      The following portions of the patient's history were reviewed and updated as appropriate: allergies, current medications, past family history, past medical history, past social history, past surgical history, and problem list.    Medications:    Current Outpatient Medications:   •  anastrozole (ARIMIDEX) 1 MG tablet, Take 1 tablet by mouth Daily., Disp: 30 tablet, Rfl: 11  •  Calcium Carb-Cholecalciferol (CALCIUM 1000 + D PO), Take 1 tablet/day by mouth. Vitamin D 2,000 mg, Disp: , Rfl:   •  metoprolol succinate XL (TOPROL-XL) 25 MG 24 hr tablet, Take 1 tablet by mouth Every Night., Disp: 90 tablet, Rfl: 3    Subjective  Allergies   Allergen Reactions   • Latex Unknown - High Severity     Rash with gloves and bandaid   • Metal Unknown - High Severity     Nickel  Causes rash    • Nickel Rash   • Aspirin GI Intolerance     ulcers   • Augmentin [Amoxicillin-Pot Clavulanate] Nausea Only and Headache   • Bacitracin-Polymyxin B Rash   • Codeine Nausea And Vomiting   • Colloidal Oatmeal Rash   • Hydrocortisone Rash        Past Medical History:   Diagnosis Date   • Aortic valve sclerosis    • Breast cancer (HCC) 2009    RIGHT   • History of transfusion 1980    no reaction shadi ky   • Hx of radiation therapy 2009    RT BREAST CANCER   • Hypertension    • Osteoporosis    • Skin cancer     facial forehead       Past Surgical History:   Procedure Laterality Date   • BREAST BIOPSY Right 04/27/2009    Stereotactic biopsy   • BREAST  LUMPECTOMY Right 2009   • COLONOSCOPY     • ECTOPIC PREGNANCY SURGERY     • ENDOSCOPY     • EYE SURGERY      bilateral cataract   • MASTECTOMY W/ SENTINEL NODE BIOPSY Bilateral 2022    Procedure: SIMPLE  MASTECTOMY, PROPHYLACTIC RIGHT SIMPLE MASTECTOMY, LEFT SENTINEL NODE BIOPSY;  Surgeon: Modesto Rodriguez MD;  [facility];  Service: General;  Laterality: Bilateral;   • SKIN BIOPSY     • TONSILLECTOMY     • TUBAL ABDOMINAL LIGATION         Family History   Problem Relation Age of Onset   • Breast cancer Sister 67   • Dementia Brother    • Pancreatic cancer Brother    • Stroke Maternal Grandmother    • Breast cancer Cousin 40   • Ovarian cancer Neg Hx         Social History     Socioeconomic History   • Marital status:    Tobacco Use   • Smoking status: Former     Packs/day: 1.00     Years: 15.00     Pack years: 15.00     Types: Cigarettes     Quit date:      Years since quittin.0   • Smokeless tobacco: Never   Vaping Use   • Vaping Use: Never used   Substance and Sexual Activity   • Alcohol use: Yes     Alcohol/week: 1.0 standard drink     Types: 1 Glasses of wine per week   • Drug use: Never   • Sexual activity: Not Currently       Review of Systems   Constitutional: Negative for activity change, chills, fatigue, fever and unexpected weight change.   HENT: Negative for congestion, ear pain, postnasal drip, sinus pressure and sore throat.    Eyes: Negative for pain, discharge and redness.   Respiratory: Negative for cough, shortness of breath and wheezing.    Cardiovascular: Negative for chest pain, palpitations and leg swelling.   Gastrointestinal: Negative for diarrhea, nausea and vomiting.   Endocrine: Negative for cold intolerance and heat intolerance.   Genitourinary: Negative for decreased urine volume and dysuria.   Musculoskeletal: Negative for arthralgias and myalgias.   Skin: Negative for rash and wound.   Neurological: Negative for dizziness, light-headedness and  "headaches.   Hematological: Does not bruise/bleed easily.   Psychiatric/Behavioral: Negative for confusion, dysphoric mood and sleep disturbance. The patient is not nervous/anxious.          Objective  Vitals:    12/28/22 1329   BP: 122/78   BP Location: Left arm   Patient Position: Sitting   Cuff Size: Adult   Pulse: 62   Resp: 16   Temp: 96.8 °F (36 °C)   TempSrc: Temporal   SpO2: 99%   Weight: 49.7 kg (109 lb 9.6 oz)   Height: 152.4 cm (60\")     Body mass index is 21.4 kg/m².     Physical Exam  Physical Exam  Vitals and nursing note reviewed.   Constitutional:       General: She is not in acute distress.     Appearance: She is not ill-appearing.   HENT:      Head: Normocephalic.      Right Ear: Tympanic membrane, ear canal and external ear normal. There is no impacted cerumen.      Left Ear: Tympanic membrane, ear canal and external ear normal. There is no impacted cerumen.      Nose: No congestion or rhinorrhea.      Mouth/Throat:      Mouth: Mucous membranes are moist.      Pharynx: Oropharynx is clear. No oropharyngeal exudate or posterior oropharyngeal erythema.   Eyes:      General:         Right eye: No discharge.         Left eye: No discharge.      Extraocular Movements: Extraocular movements intact.      Conjunctiva/sclera: Conjunctivae normal.      Pupils: Pupils are equal, round, and reactive to light.   Cardiovascular:      Rate and Rhythm: Normal rate and regular rhythm.      Heart sounds: Normal heart sounds. No murmur heard.    No friction rub. No gallop.   Pulmonary:      Effort: Pulmonary effort is normal. No respiratory distress.      Breath sounds: Normal breath sounds. No wheezing.   Abdominal:      General: Bowel sounds are normal. There is no distension.      Palpations: Abdomen is soft. There is no mass.      Tenderness: There is no abdominal tenderness.   Musculoskeletal:         General: No swelling. Normal range of motion.      Cervical back: Normal range of motion. No tenderness.      " Right lower leg: No edema.      Left lower leg: No edema.   Lymphadenopathy:      Cervical: No cervical adenopathy.   Skin:     Findings: No bruising, erythema or rash.   Neurological:      Mental Status: She is oriented to person, place, and time.      Gait: Gait normal.   Psychiatric:         Mood and Affect: Mood normal.         Behavior: Behavior normal.         Thought Content: Thought content normal.         Judgment: Judgment normal.         Diagnostic Data  Procedures    Assessment  Diagnoses and all orders for this visit:    1. Encounter for pre-operative cardiovascular clearance (Primary)  -     CBC w AUTO Differential; Future  -     Comprehensive Metabolic Panel  -     XR Chest PA & Lateral; Future  -     Ambulatory Referral to Cardiology  -     CBC w AUTO Differential        Plan    1. Encounter for pre-operative cardiovascular clearance (Primary)- obtain CBC, CMP, and chest x-ray.  Referred to cardiology urgently for time sensitivity.      Return in about 3 months (around 3/28/2023) for Recheck w/ Dr. Oliveros..    Abdiaziz Chamorro PA-C  12/28/2022

## 2022-12-29 ENCOUNTER — TELEPHONE (OUTPATIENT)
Dept: INTERNAL MEDICINE | Facility: CLINIC | Age: 82
End: 2022-12-29

## 2022-12-29 NOTE — TELEPHONE ENCOUNTER
Spoke with patient about normal labs and stable xray results. Patient verbalized understanding no further questions at this time.

## 2022-12-30 ENCOUNTER — OFFICE VISIT (OUTPATIENT)
Dept: CARDIOLOGY | Facility: CLINIC | Age: 82
End: 2022-12-30

## 2022-12-30 VITALS
OXYGEN SATURATION: 99 % | HEIGHT: 60 IN | DIASTOLIC BLOOD PRESSURE: 68 MMHG | WEIGHT: 108 LBS | SYSTOLIC BLOOD PRESSURE: 136 MMHG | HEART RATE: 64 BPM | BODY MASS INDEX: 21.2 KG/M2

## 2022-12-30 DIAGNOSIS — Z01.811 PRE-OP CHEST EXAM: Primary | ICD-10-CM

## 2022-12-30 PROCEDURE — 93000 ELECTROCARDIOGRAM COMPLETE: CPT | Performed by: PHYSICIAN ASSISTANT

## 2022-12-30 PROCEDURE — 99213 OFFICE O/P EST LOW 20 MIN: CPT | Performed by: PHYSICIAN ASSISTANT

## 2022-12-30 RX ORDER — MULTIPLE VITAMINS W/ MINERALS TAB 9MG-400MCG
TAB ORAL
COMMUNITY

## 2022-12-30 NOTE — PROGRESS NOTES
"[facility]  INITIAL OFFICE CONSULT      Cecile Bennett  1940  PCP: Leon Oliveros MD  Previous Cardioloy Provider: Luli ANGULO    SUBJECTIVE:   Cecile Bennett is a 82 y.o. 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 She has a past history of a heart \"murmur\" and she had echocardiogram 2020 revealing normal EF and Mild AI. She is asymptomatic. She has no chest pain or sob with exertion suggesting angina. She denies any palpitations, dizziness, near syncope, or syncope. She is active and goes to gym on a regular bases with no symptoms. She had breast cancer and she had to have bilateral mastectomy recently and she did well with surgery.  She states she needs oral surgery for dental disease.       Cardiac PMH: (Old records have been reviewed and summarized below)  1. Abnormal EKG  a. Echocardiogram 1/2020- Normal EF LVH, Mild AI.   b. No symptoms  2. Oral surgery: dental extraction required  3. Breast Cancer, S/p bilateral mastectomy.arimidex. 4/2021  4. HTN: Controlled Toprol.   5. Remote Tobacco abuse       Past Medical History, Past Surgical History, Family history, Social History, and Medications were all reviewed with the patient today and updated as necessary.     Current Outpatient Medications   Medication Sig Dispense Refill   • anastrozole (ARIMIDEX) 1 MG tablet Take 1 tablet by mouth Daily. 30 tablet 11   • Calcium Carb-Cholecalciferol (CALCIUM 1000 + D PO) Take 1 tablet/day by mouth. Vitamin D 2,000 mg     • metoprolol succinate XL (TOPROL-XL) 25 MG 24 hr tablet Take 1 tablet by mouth Every Night. 90 " tablet 3   • multivitamin with minerals tablet tablet Take  by mouth.       No current facility-administered medications for this visit.     Allergies   Allergen Reactions   • Latex Unknown - High Severity     Rash with gloves and bandaid   • Metal Unknown - High Severity     Nickel  Causes rash    • Nickel Rash   • Amoxicillin Other (See Comments)   • Aspirin GI Intolerance     ulcers  Avoids secondary to peptic ulcer disease.   • Augmentin [Amoxicillin-Pot Clavulanate] Nausea Only and Headache   • Bacitracin-Polymyxin B Rash   • Codeine Nausea And Vomiting   • Colloidal Oatmeal Rash   • Hydrocortisone Rash         Past Medical History:   Diagnosis Date   • Aortic valve sclerosis    • Breast cancer (HCC) 2009    RIGHT   • History of transfusion 1980    no reaction shadi ky   • Hx of radiation therapy 2009    RT BREAST CANCER   • Hypertension    • Osteoporosis    • Skin cancer     facial forehead     Past Surgical History:   Procedure Laterality Date   • BREAST BIOPSY Right 04/27/2009    Stereotactic biopsy   • BREAST LUMPECTOMY Right 05/26/2009   • COLONOSCOPY     • ECTOPIC PREGNANCY SURGERY     • ENDOSCOPY     • EYE SURGERY      bilateral cataract   • MASTECTOMY W/ SENTINEL NODE BIOPSY Bilateral 4/19/2022    Procedure: SIMPLE  MASTECTOMY, PROPHYLACTIC RIGHT SIMPLE MASTECTOMY, LEFT SENTINEL NODE BIOPSY;  Surgeon: Modesto Rodriguez MD;  Location: [facility];  Service: General;  Laterality: Bilateral;   • SKIN BIOPSY     • TONSILLECTOMY     • TUBAL ABDOMINAL LIGATION       Family History   Problem Relation Age of Onset   • No Known Problems Mother    • Lung cancer Father    • Breast cancer Sister 67   • Dementia Brother    • Pancreatic cancer Brother    • Stroke Maternal Grandmother    • Breast cancer Cousin 40   • Ovarian cancer Neg Hx      Social History     Tobacco Use   • Smoking status: Former     Packs/day: 1.00     Years: 15.00     Pack years: 15.00     Types: Cigarettes     Quit date: 1987     Years  "since quittin.0   • Smokeless tobacco: Never   Substance Use Topics   • Alcohol use: Yes     Alcohol/week: 1.0 standard drink     Types: 1 Glasses of wine per week       ROS:  Review of Symptoms:  General: no recent weight loss/gain, weakness or fatigue  Skin: no rashes, lumps, or other skin changes  HEENT: no dizziness, lightheadedness, or vision changes. Dental disease needs surgery.   Respiratory: no cough or hemoptysis  Cardiovascular: no palpitations, and tachycardia  Gastrointestinal: no black/tarry stools or diarrhea  Urinary: no change in frequency or urgency  Peripheral Vascular: no claudication or leg cramps  Musculoskeletal: no muscle or joint pain/stiffness  Psychiatric: no depression or excessive stress  Neurological: no sensory or motor loss, no syncope  Hematologic: no anemia, easy bruising or bleeding  Endocrine: no thyroid problems, nor heat or cold intolerance         PHYSICAL EXAM:   /68 (BP Location: Right arm, Patient Position: Sitting)   Pulse 64   Ht 152.4 cm (60\")   Wt 49 kg (108 lb)   SpO2 99%   BMI 21.09 kg/m²      Wt Readings from Last 5 Encounters:   22 49 kg (108 lb)   22 49.7 kg (109 lb 9.6 oz)   22 49 kg (108 lb)   22 50.3 kg (111 lb)   22 51.3 kg (113 lb)     BP Readings from Last 5 Encounters:   22 136/68   22 122/78   22 130/70   22 132/82   22 (!) 183/66       General-Well Nourished, Well developed  Eyes - PERRLA  Neck- supple, No mass  CV- regular rate and rhythm, no MRG  Lung- clear bilaterally  Abd- soft, +BS  Musc/skel - Norm strength and range of motion  Skin- warm and dry  Neuro - Alert & Oriented x 3, appropriate mood.    Patient's external notes were reviewed.  Independent interpretation of test performed by another physician in facility were reviewed.  Outside laboratory data was also reviewed.    Medical problems and test results were reviewed with the patient today.     Results for orders placed " or performed in visit on 12/28/22   Comprehensive Metabolic Panel    Specimen: Blood   Result Value Ref Range    Glucose 84 65 - 99 mg/dL    BUN 14 8 - 23 mg/dL    Creatinine 0.93 0.57 - 1.00 mg/dL    Sodium 142 136 - 145 mmol/L    Potassium 4.3 3.5 - 5.2 mmol/L    Chloride 105 98 - 107 mmol/L    CO2 28.0 22.0 - 29.0 mmol/L    Calcium 9.9 8.6 - 10.5 mg/dL    Total Protein 6.6 6.0 - 8.5 g/dL    Albumin 4.3 3.5 - 5.2 g/dL    ALT (SGPT) 18 1 - 33 U/L    AST (SGOT) 36 (H) 1 - 32 U/L    Alkaline Phosphatase 77 39 - 117 U/L    Total Bilirubin 0.6 0.0 - 1.2 mg/dL    Globulin 2.3 gm/dL    A/G Ratio 1.9 g/dL    BUN/Creatinine Ratio 15.1 7.0 - 25.0    Anion Gap 9.0 5.0 - 15.0 mmol/L    eGFR 62.3 >60.0 mL/min/1.73   CBC Auto Differential    Specimen: Blood   Result Value Ref Range    WBC 6.98 3.40 - 10.80 10*3/mm3    RBC 4.29 3.77 - 5.28 10*6/mm3    Hemoglobin 13.4 12.0 - 15.9 g/dL    Hematocrit 39.2 34.0 - 46.6 %    MCV 91.4 79.0 - 97.0 fL    MCH 31.2 26.6 - 33.0 pg    MCHC 34.2 31.5 - 35.7 g/dL    RDW 12.9 12.3 - 15.4 %    RDW-SD 42.8 37.0 - 54.0 fl    MPV 12.3 (H) 6.0 - 12.0 fL    Platelets 165 140 - 450 10*3/mm3    Neutrophil % 49.9 42.7 - 76.0 %    Lymphocyte % 35.2 19.6 - 45.3 %    Monocyte % 10.2 5.0 - 12.0 %    Eosinophil % 4.0 0.3 - 6.2 %    Basophil % 0.6 0.0 - 1.5 %    Immature Grans % 0.1 0.0 - 0.5 %    Neutrophils, Absolute 3.48 1.70 - 7.00 10*3/mm3    Lymphocytes, Absolute 2.46 0.70 - 3.10 10*3/mm3    Monocytes, Absolute 0.71 0.10 - 0.90 10*3/mm3    Eosinophils, Absolute 0.28 0.00 - 0.40 10*3/mm3    Basophils, Absolute 0.04 0.00 - 0.20 10*3/mm3    Immature Grans, Absolute 0.01 0.00 - 0.05 10*3/mm3    nRBC 0.0 0.0 - 0.2 /100 WBC         Lab Results   Component Value Date    CHOL 184 07/14/2021    HDL 83 (H) 07/14/2021    LDL 89 07/14/2021    VLDL 12 07/14/2021       EKG:  (EKG/Tracing has been independently visualized by me and summarized below)      ECG 12 Lead    Date/Time: 12/30/2022 11:20 AM  Performed by:  Willi Nelson PA  Authorized by: Willi Nelson PA   Comparison: not compared with previous ECG   Rhythm: sinus rhythm  Rate: normal  Conduction: conduction normal  T Waves: T waves normal  QRS axis: normal  Other findings: left ventricular hypertrophy    Clinical impression: non-specific ECG            ASSESSMENT   1. Abnormal EKG, Pre op evaluation for Oral sugery  2. HTN: Toprol rx. Controlled  3. Breast Cancer, recent Bilateral mastectomy  4. Echocardiogram 2020 Normal EF, Mild AI.       PLAN  · Patient is considered acceptable risk for oral sugery. She has no angina or CHF symptoms. BP is well controlled. [facility] with [facility] Normal EF, Mild AI(Asymptomatic).         Cardiology/Electrophysiology  12/30/22  11:08 EST  Will Omar SANCHES

## 2023-01-03 ENCOUNTER — TELEPHONE (OUTPATIENT)
Dept: ONCOLOGY | Facility: CLINIC | Age: 83
End: 2023-01-03
Payer: MEDICARE

## 2023-01-03 NOTE — TELEPHONE ENCOUNTER
I called Nuria back (pt daughter) and she said that her mom was anxious about the prolia and the dental work she needs to have done.  I advised Nuria that in our last phone conversation with patient that per ADELE Falk we would discuss the prolia in February as patient has a lot going on with her teeth right now.  I told her that we would put the prolia aside until her oral care is complete.  She is stating that all her teeth have to be removed and Nuria stated today that they are going to talk with her mom's dentist to see if there are alternatives to the teeth extractions.  I told Nuria we would talk about prolia during the Feb appt and she verbalized understanding.  She said a family member would be with her mom during that appt.  I told her that would be great.

## 2023-01-03 NOTE — TELEPHONE ENCOUNTER
Caller: Nuria Will    Relationship: Emergency Contact    Best call back number: [phone]    Who are you requesting to speak with (clinical staff, provider,  specific staff member): CLINICAL    What was the call regarding: REQUESTING TO SPEAK WITH PROVIDER TO CLARIFY WHY DENTAL WORK IS NEEDED PRIOR TO PROLIA TREATMENT    Do you require a callback: YES

## 2023-02-07 ENCOUNTER — OFFICE VISIT (OUTPATIENT)
Dept: ONCOLOGY | Facility: CLINIC | Age: 83
End: 2023-02-07
Payer: MEDICARE

## 2023-02-07 VITALS
RESPIRATION RATE: 16 BRPM | BODY MASS INDEX: 21.6 KG/M2 | HEART RATE: 70 BPM | TEMPERATURE: 97.7 F | OXYGEN SATURATION: 99 % | DIASTOLIC BLOOD PRESSURE: 84 MMHG | HEIGHT: 60 IN | WEIGHT: 110 LBS | SYSTOLIC BLOOD PRESSURE: 186 MMHG

## 2023-02-07 DIAGNOSIS — M81.0 OSTEOPOROSIS, UNSPECIFIED OSTEOPOROSIS TYPE, UNSPECIFIED PATHOLOGICAL FRACTURE PRESENCE: Primary | ICD-10-CM

## 2023-02-07 PROCEDURE — 99214 OFFICE O/P EST MOD 30 MIN: CPT | Performed by: NURSE PRACTITIONER

## 2023-02-07 RX ORDER — ANASTROZOLE 1 MG/1
1 TABLET ORAL DAILY
Qty: 90 TABLET | Refills: 3 | Status: SHIPPED | OUTPATIENT
Start: 2023-02-07

## 2023-02-07 NOTE — PROGRESS NOTES
"MEDICAL ONCOLOGY CANCER SURVIVORSHIP VISIT          PROBLEM LIST:  1. 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  B) anastrozole started May 2022  2. History of right breast cancer  3. Hypertension  4. osteoporosis    Subjective     CHIEF COMPLAINT: breast cancer    HISTORY OF PRESENT ILLNESS:   Cecile Bennett returns for follow-up.  She continues on anastrozole.  She is tolerating it well.  She denies any side effects from the medication.    She had a dental evaluation recently and was told she needed no immediate foreseeable dental procedures.        Objective      BP (!) 186/84 Comment: LUE  Pulse 70   Temp 97.7 °F (36.5 °C) (Infrared)   Resp 16   Ht 152.4 cm (60\")   Wt 49.9 kg (110 lb)   SpO2 99% Comment: RA  BMI 21.48 kg/m²    Vitals:    02/07/23 1013   PainSc: 0-No pain               ECOG score: 0             General: well appearing female in no acute distress  Neuro: alert and oriented  HEENT: sclera anicteric, oropharynx clear  Lymphatics: no cervical, supraclavicular, or axillary adenopathy  Cardiovascular: regular rate and rhythm, no murmurs  Lungs: clear to auscultation bilaterally  Breast: Bilateral mastectomy incisions well-healed with no masses or skin changes  Abdomen: soft, nontender, nondistended.  No palpable organomegaly  Extremeties: no lower extremity edema  Skin: no rashes, lesions, bruising, or petechiae  Psych: mood and affect appropriate    I have reexamined the patient and the results are consistent with the previously documented exam. Tata Martínez, APRN        RECENT LABS:  Lab Results   Component Value Date    WBC 6.98 12/28/2022    HGB 13.4 12/28/2022    HCT 39.2 12/28/2022    MCV 91.4 12/28/2022     12/28/2022       Lab Results   Component Value Date    GLUCOSE 84 12/28/2022    BUN 14 12/28/2022    CREATININE " 0.93 12/28/2022    EGFRIFNONA 62 07/14/2021    BCR 15.1 12/28/2022    K 4.3 12/28/2022    CO2 28.0 12/28/2022    CALCIUM 9.9 12/28/2022    ALBUMIN 4.3 12/28/2022    AST 36 (H) 12/28/2022    ALT 18 12/28/2022       XR Chest PA & Lateral  Narrative: DATE OF EXAM: 12/28/2022 3:06 PM     PROCEDURE: XR CHEST PA AND LATERAL-     INDICATIONS: pre-op; Z01.810-Encounter for preprocedural cardiovascular  examination     COMPARISON: No comparisons available.     TECHNIQUE: Two radiologic views of the chest, PA and lateral, were  obtained.     FINDINGS:  Scoliosis. Heart size and pulmonary vasculature are within normal  limits. Lungs hyperinflated. Lungs clear. Costophrenic angles sharp      Impression: Scoliosis and COPD with no acute cardiopulmonary process demonstrated     This report was finalized on 12/28/2022 6:49 PM by Phan Leyva.                ASSESSMENT AND PLAN:     Cecile Bennett is a 82 y.o. female  with a T2N0M0 ER+ Her2 negative IDC of the left breast.     She began anastrozole early May 2022 and is tolerating it well.  We will continue this for a minimum of 5 years.  Refill of anastrozole sent to pharmacy today.    Osteoporosis: Bone density from 7/14/2022 showed osteoporosis.  She has had a dental evaluation and has no immediate foreseeable dental procedures needed.  She would like to begin on Prolia soon.  I reviewed the most common side effects to be expected with Prolia.  We will plan on starting Prolia next week.  She will need a repeat bone density scan July 2024.    The patient and I have reviewed [facility] personal Survivorship Care Plan in detail. We discussed diagnosis, pathology, histology, all treatments, and ongoing surveillance recommendations. All questions were answered to her satisfaction. The patient is in agreement with our plan for ongoing surveillance as outlined in the plan. A copy of this document was provided at the completion of our visit.  A copy has also been sent to the  patient's primary care provider.      Follow-up 6 months.                      Tata Martínez APRN  [facility] Hematology and Oncology    2/7/2023          CC:

## 2023-02-08 ENCOUNTER — TELEPHONE (OUTPATIENT)
Dept: ONCOLOGY | Facility: CLINIC | Age: 83
End: 2023-02-08
Payer: MEDICARE

## 2023-02-08 NOTE — TELEPHONE ENCOUNTER
Caller: Cecile Bennett    Relationship: Self    Best call back number: [phone]    What is the best time to reach you: ANY    Who are you requesting to speak with (clinical staff, provider,  specific staff member): CLINICAL    What was the call regarding: CECILE IS CALLING STATES SHE BROUGHT HER DENTAL RECORDS TO HER APPT AND SHE DOESN'T KNOW IF THEY ARE STILL THERE OR IN HER CHART. SHE WANTS TO MAKE SURE SHE DIDN'T LOOSE THEM    Do you require a callback: YES

## 2023-02-08 NOTE — TELEPHONE ENCOUNTER
I talked with ADELE Falk and she handed the records  Back to the patient yesterday and she put them in her purse.  I told patient and she is frustrated as she cannot find them.  I told her I would check at the checkout area and let her know if we come across them.  She verbalized understanding.

## 2023-02-10 ENCOUNTER — TELEPHONE (OUTPATIENT)
Dept: ONCOLOGY | Facility: CLINIC | Age: 83
End: 2023-02-10

## 2023-02-10 DIAGNOSIS — M81.0 OSTEOPOROSIS, UNSPECIFIED OSTEOPOROSIS TYPE, UNSPECIFIED PATHOLOGICAL FRACTURE PRESENCE: Primary | ICD-10-CM

## 2023-02-10 NOTE — TELEPHONE ENCOUNTER
Caller: Cecile Bennett    Relationship to patient: Self    Best call back number: [phone]    Type of visit: INJECTION     Requested date: AFTER 2/14, CALL TO R/S     If rescheduling, when is the original appointment: 2/14/2023

## 2023-02-16 ENCOUNTER — HOSPITAL ENCOUNTER (OUTPATIENT)
Dept: ONCOLOGY | Facility: HOSPITAL | Age: 83
Discharge: HOME | End: 2023-02-16
Admitting: NURSE PRACTITIONER
Payer: MEDICARE

## 2023-02-16 VITALS
HEIGHT: 60 IN | TEMPERATURE: 97.1 F | SYSTOLIC BLOOD PRESSURE: 176 MMHG | BODY MASS INDEX: 21.4 KG/M2 | DIASTOLIC BLOOD PRESSURE: 78 MMHG | HEART RATE: 64 BPM | WEIGHT: 109 LBS | RESPIRATION RATE: 16 BRPM

## 2023-02-16 DIAGNOSIS — M81.0 OSTEOPOROSIS, UNSPECIFIED OSTEOPOROSIS TYPE, UNSPECIFIED PATHOLOGICAL FRACTURE PRESENCE: Primary | ICD-10-CM

## 2023-02-16 LAB
ALBUMIN SERPL-MCNC: 4.3 G/DL (ref 3.5–5.2)
ALBUMIN/GLOB SERPL: 1.7 G/DL
ALP SERPL-CCNC: 78 U/L (ref 39–117)
ALT SERPL W P-5'-P-CCNC: 21 U/L (ref 1–33)
ANION GAP SERPL CALCULATED.3IONS-SCNC: 9 MMOL/L (ref 5–15)
AST SERPL-CCNC: 37 U/L (ref 1–32)
BASOPHILS # BLD AUTO: 0.02 10*3/MM3 (ref 0–0.2)
BASOPHILS NFR BLD AUTO: 0.3 % (ref 0–1.5)
BILIRUB SERPL-MCNC: 0.4 MG/DL (ref 0–1.2)
BUN SERPL-MCNC: 14 MG/DL (ref 8–23)
BUN/CREAT SERPL: 18.9 (ref 7–25)
CALCIUM SPEC-SCNC: 9.4 MG/DL (ref 8.6–10.5)
CHLORIDE SERPL-SCNC: 108 MMOL/L (ref 98–107)
CO2 SERPL-SCNC: 27 MMOL/L (ref 22–29)
CREAT SERPL-MCNC: 0.74 MG/DL (ref 0.57–1)
DEPRECATED RDW RBC AUTO: 46.3 FL (ref 37–54)
EGFRCR SERPLBLD CKD-EPI 2021: 81.9 ML/MIN/1.73
EOSINOPHIL # BLD AUTO: 0.23 10*3/MM3 (ref 0–0.4)
EOSINOPHIL NFR BLD AUTO: 3.3 % (ref 0.3–6.2)
ERYTHROCYTE [DISTWIDTH] IN BLOOD BY AUTOMATED COUNT: 13.1 % (ref 12.3–15.4)
GLOBULIN UR ELPH-MCNC: 2.6 GM/DL
GLUCOSE SERPL-MCNC: 95 MG/DL (ref 65–99)
HCT VFR BLD AUTO: 42 % (ref 34–46.6)
HGB BLD-MCNC: 13.5 G/DL (ref 12–15.9)
IMM GRANULOCYTES # BLD AUTO: 0.01 10*3/MM3 (ref 0–0.05)
IMM GRANULOCYTES NFR BLD AUTO: 0.1 % (ref 0–0.5)
LYMPHOCYTES # BLD AUTO: 1.83 10*3/MM3 (ref 0.7–3.1)
LYMPHOCYTES NFR BLD AUTO: 26.3 % (ref 19.6–45.3)
MAGNESIUM SERPL-MCNC: 2.5 MG/DL (ref 1.6–2.4)
MCH RBC QN AUTO: 30.3 PG (ref 26.6–33)
MCHC RBC AUTO-ENTMCNC: 32.1 G/DL (ref 31.5–35.7)
MCV RBC AUTO: 94.2 FL (ref 79–97)
MONOCYTES # BLD AUTO: 0.74 10*3/MM3 (ref 0.1–0.9)
MONOCYTES NFR BLD AUTO: 10.6 % (ref 5–12)
NEUTROPHILS NFR BLD AUTO: 4.12 10*3/MM3 (ref 1.7–7)
NEUTROPHILS NFR BLD AUTO: 59.4 % (ref 42.7–76)
PHOSPHATE SERPL-MCNC: 2.9 MG/DL (ref 2.5–4.5)
PLATELET # BLD AUTO: 155 10*3/MM3 (ref 140–450)
PMV BLD AUTO: 11.3 FL (ref 6–12)
POTASSIUM SERPL-SCNC: 4.2 MMOL/L (ref 3.5–5.2)
PROT SERPL-MCNC: 6.9 G/DL (ref 6–8.5)
RBC # BLD AUTO: 4.46 10*6/MM3 (ref 3.77–5.28)
SODIUM SERPL-SCNC: 144 MMOL/L (ref 136–145)
WBC NRBC COR # BLD: 6.95 10*3/MM3 (ref 3.4–10.8)

## 2023-02-16 PROCEDURE — 36415 COLL VENOUS BLD VENIPUNCTURE: CPT

## 2023-02-16 PROCEDURE — 84100 ASSAY OF PHOSPHORUS: CPT | Performed by: NURSE PRACTITIONER

## 2023-02-16 PROCEDURE — 83735 ASSAY OF MAGNESIUM: CPT | Performed by: NURSE PRACTITIONER

## 2023-02-16 PROCEDURE — 85025 COMPLETE CBC W/AUTO DIFF WBC: CPT | Performed by: NURSE PRACTITIONER

## 2023-02-16 PROCEDURE — 25010000002 DENOSUMAB 60 MG/ML SOLUTION PREFILLED SYRINGE: Performed by: NURSE PRACTITIONER

## 2023-02-16 PROCEDURE — 96372 THER/PROPH/DIAG INJ SC/IM: CPT

## 2023-02-16 PROCEDURE — 80053 COMPREHEN METABOLIC PANEL: CPT | Performed by: NURSE PRACTITIONER

## 2023-02-16 RX ADMIN — DENOSUMAB 60 MG: 60 INJECTION SUBCUTANEOUS at 10:24

## 2023-02-27 RX ORDER — METOPROLOL SUCCINATE 25 MG/1
25 TABLET, EXTENDED RELEASE ORAL NIGHTLY
Qty: 90 TABLET | Refills: 3 | Status: SHIPPED | OUTPATIENT
Start: 2023-02-27

## 2023-02-27 NOTE — TELEPHONE ENCOUNTER
Rx Refill Note  Requested Prescriptions     Pending Prescriptions Disp Refills   • metoprolol succinate XL (TOPROL-XL) 25 MG 24 hr tablet 90 tablet 3     Sig: Take 1 tablet by mouth Every Night.      Last office visit with prescribing clinician: 10/12/2022   Last telemedicine visit with prescribing clinician: 3/3/2023   Next office visit with prescribing clinician: 3/3/2023                           Gato Rivera MA  02/27/23, 09:44 EST

## 2023-02-27 NOTE — TELEPHONE ENCOUNTER
Caller: Cecile Bennett    Relationship: Self    Best call back number: [phone]  Requested Prescriptions:   Requested Prescriptions     Pending Prescriptions Disp Refills   • metoprolol succinate XL (TOPROL-XL) 25 MG 24 hr tablet 90 tablet 3     Sig: Take 1 tablet by mouth Every Night.        Pharmacy where request should be sent: [facility] [address] [place] [address] [phone] [facility] [phone] FX     Additional details provided by patient: PATIENT STATES THAT SHE HAS 4 DAYS LEFT     Does the patient have less than a 3 day supply:  [] Yes  [x] No    Would you like a call back once the refill request has been completed: [] Yes [x] No    If the office needs to give you a call back, can they leave a voicemail: [] Yes [x] No    Lela Norton Rep   02/27/23 09:38 EST

## 2023-03-03 ENCOUNTER — OFFICE VISIT (OUTPATIENT)
Dept: INTERNAL MEDICINE | Facility: CLINIC | Age: 83
End: 2023-03-03
Payer: MEDICARE

## 2023-03-03 VITALS
HEART RATE: 72 BPM | BODY MASS INDEX: 21.4 KG/M2 | HEIGHT: 60 IN | WEIGHT: 109 LBS | OXYGEN SATURATION: 100 % | SYSTOLIC BLOOD PRESSURE: 134 MMHG | DIASTOLIC BLOOD PRESSURE: 74 MMHG

## 2023-03-03 DIAGNOSIS — C50.912 INVASIVE DUCTAL CARCINOMA OF BREAST, FEMALE, LEFT: ICD-10-CM

## 2023-03-03 DIAGNOSIS — M81.0 OSTEOPOROSIS, UNSPECIFIED OSTEOPOROSIS TYPE, UNSPECIFIED PATHOLOGICAL FRACTURE PRESENCE: ICD-10-CM

## 2023-03-03 DIAGNOSIS — I10 ESSENTIAL HYPERTENSION: Primary | ICD-10-CM

## 2023-03-03 DIAGNOSIS — F41.9 ANXIETY: ICD-10-CM

## 2023-03-03 DIAGNOSIS — K27.9 PUD (PEPTIC ULCER DISEASE): ICD-10-CM

## 2023-03-03 PROCEDURE — 99214 OFFICE O/P EST MOD 30 MIN: CPT | Performed by: INTERNAL MEDICINE

## 2023-03-03 RX ORDER — CITALOPRAM 10 MG/1
10 TABLET ORAL EVERY MORNING
Qty: 30 TABLET | Refills: 5 | Status: SHIPPED | OUTPATIENT
Start: 2023-03-03

## 2023-03-03 RX ORDER — BETAMETHASONE DIPROPIONATE 0.5 MG/G
1 CREAM TOPICAL 2 TIMES DAILY PRN
Qty: 45 G | Refills: 1 | Status: SHIPPED | OUTPATIENT
Start: 2023-03-03

## 2023-03-03 RX ORDER — DENOSUMAB 60 MG/ML
60 INJECTION SUBCUTANEOUS ONCE
COMMUNITY

## 2023-03-03 NOTE — PROGRESS NOTES
Patient is a 82 y.o. female who is here for a follow up of hypertension.  Chief Complaint   Patient presents with   • Hypertension         HPI:    Here for mgmt of HTN and PUD.  Having issues with anxiety.  Cannot sleep well at night.  Little things make her anxious.  Has not tried anything in the past.  BP has been higher than usual.  No nausea or emesis.      History:     Patient Active Problem List   Diagnosis   • Ataxia   • Dizziness   • Numbness and tingling of both feet   • PUD (peptic ulcer disease)   • Seasonal allergic rhinitis due to pollen   • Osteoporosis   • Essential hypertension   • Pharyngitis   • Leg edema, left   • Invasive ductal carcinoma of breast, female, left (HCC)   • Anxiety       Past Medical History:   Diagnosis Date   • Aortic valve sclerosis    • Breast cancer (HCC) 2009    RIGHT   • History of transfusion 1980    no reaction shadi ky   • Hx of radiation therapy 2009    RT BREAST CANCER   • Hypertension    • Osteoporosis    • Skin cancer     facial forehead       Past Surgical History:   Procedure Laterality Date   • BREAST BIOPSY Right 04/27/2009    Stereotactic biopsy   • BREAST LUMPECTOMY Right 05/26/2009   • COLONOSCOPY     • ECTOPIC PREGNANCY SURGERY     • ENDOSCOPY     • EYE SURGERY      bilateral cataract   • MASTECTOMY W/ SENTINEL NODE BIOPSY Bilateral 04/19/2022    Procedure: SIMPLE  MASTECTOMY, PROPHYLACTIC RIGHT SIMPLE MASTECTOMY, LEFT SENTINEL NODE BIOPSY;  Surgeon: Modesto Rodriguez MD;  Location: [facility];  Service: General;  Laterality: Bilateral;   • SKIN BIOPSY     • SKIN CANCER EXCISION Left 01/2023    Face- Basal Cell   • TONSILLECTOMY     • TUBAL ABDOMINAL LIGATION         Current Outpatient Medications on File Prior to Visit   Medication Sig   • anastrozole (ARIMIDEX) 1 MG tablet Take 1 tablet by mouth Daily.   • Calcium Carb-Cholecalciferol (CALCIUM 1000 + D PO) Take 1 tablet/day by mouth. Vitamin D 2,000 mg   • denosumab (Prolia) 60 MG/ML solution prefilled  syringe syringe Inject 1 mL under the skin into the appropriate area as directed 1 (One) Time. Every 6 months   • metoprolol succinate XL (TOPROL-XL) 25 MG 24 hr tablet Take 1 tablet by mouth Every Night.   • multivitamin with minerals tablet tablet Take  by mouth.     No current facility-administered medications on file prior to visit.       Family History   Problem Relation Age of Onset   • No Known Problems Mother    • Lung cancer Father    • Breast cancer Sister 67   • Dementia Brother    • Pancreatic cancer Brother    • Stroke Maternal Grandmother    • Breast cancer Cousin 40   • Ovarian cancer Neg Hx        Social History     Socioeconomic History   • Marital status:    Tobacco Use   • Smoking status: Former     Packs/day: 1.00     Years: 15.00     Pack years: 15.00     Types: Cigarettes     Quit date:      Years since quittin.1   • Smokeless tobacco: Never   Vaping Use   • Vaping Use: Never used   Substance and Sexual Activity   • Alcohol use: Yes     Alcohol/week: 1.0 standard drink     Types: 1 Glasses of wine per week   • Drug use: Never   • Sexual activity: Not Currently         Review of Systems   Constitutional: Positive for fatigue. Negative for chills, fever and unexpected weight change.   HENT: Negative for congestion, ear pain, hearing loss, rhinorrhea, sinus pressure and trouble swallowing.    Eyes: Negative for discharge and itching.   Respiratory: Negative for chest tightness and shortness of breath.    Cardiovascular: Positive for leg swelling (on left). Negative for chest pain and palpitations.   Gastrointestinal: Positive for nausea. Negative for abdominal pain, blood in stool, constipation, diarrhea and vomiting.   Endocrine: Negative for polydipsia and polyuria.   Genitourinary: Negative for difficulty urinating, dysuria, enuresis, frequency, hematuria and urgency.        3/22 mammogram   Musculoskeletal: Negative for arthralgias, back pain, gait problem and joint swelling.  "       Left bunion   Skin: Negative for rash and wound.   Allergic/Immunologic: Negative for immunocompromised state.   Neurological: Negative for dizziness, syncope, weakness, light-headedness, numbness and headaches.   Hematological: Does not bruise/bleed easily.   Psychiatric/Behavioral: Negative for behavioral problems, dysphoric mood and sleep disturbance. The patient is nervous/anxious.        /74 (BP Location: Left arm, Patient Position: Sitting)   Pulse 72   Ht 152.4 cm (60\")   Wt 49.4 kg (109 lb)   SpO2 100%   BMI 21.29 kg/m²       Physical Exam  Constitutional:       Appearance: Normal appearance. She is well-developed.   HENT:      Head: Normocephalic and atraumatic.      Right Ear: External ear normal.      Left Ear: External ear normal.      Nose: Nose normal.      Mouth/Throat:      Mouth: Mucous membranes are moist.      Pharynx: Oropharynx is clear.   Eyes:      Extraocular Movements: Extraocular movements intact.      Conjunctiva/sclera: Conjunctivae normal.      Pupils: Pupils are equal, round, and reactive to light.   Cardiovascular:      Rate and Rhythm: Normal rate and regular rhythm.      Heart sounds: Normal heart sounds.   Pulmonary:      Effort: Pulmonary effort is normal.      Breath sounds: Normal breath sounds.   Abdominal:      General: Bowel sounds are normal.      Palpations: Abdomen is soft.   Musculoskeletal:         General: Deformity (kyphosis) present. Normal range of motion.      Cervical back: Normal range of motion and neck supple.      Left lower leg: Edema present.   Lymphadenopathy:      Cervical: No cervical adenopathy.   Skin:     General: Skin is warm and dry.   Neurological:      General: No focal deficit present.      Mental Status: She is alert and oriented to person, place, and time.   Psychiatric:         Mood and Affect: Mood normal.         Behavior: Behavior normal.         Thought Content: Thought content normal. "         Procedure:      Discussion/Summary:    HTN-cont BB, advised goal of 130/80  PUD-controlled on PPI   Rhinitis-cont claritin  Osteoporosis-Advised weight bearing exercise and at least vit D 2000 IU qd, on Prolia  Anemia-recheck at goal  Abnormal glucose-counseled on diet  Breast cancer-cont Arimidex     2/16 labs noted and dw patient    Current Outpatient Medications:   •  anastrozole (ARIMIDEX) 1 MG tablet, Take 1 tablet by mouth Daily., Disp: 90 tablet, Rfl: 3  •  Calcium Carb-Cholecalciferol (CALCIUM 1000 + D PO), Take 1 tablet/day by mouth. Vitamin D 2,000 mg, Disp: , Rfl:   •  denosumab (Prolia) 60 MG/ML solution prefilled syringe syringe, Inject 1 mL under the skin into the appropriate area as directed 1 (One) Time. Every 6 months, Disp: , Rfl:   •  metoprolol succinate XL (TOPROL-XL) 25 MG 24 hr tablet, Take 1 tablet by mouth Every Night., Disp: 90 tablet, Rfl: 3  •  multivitamin with minerals tablet tablet, Take  by mouth., Disp: , Rfl:   •  betamethasone dipropionate 0.05 % cream, Apply 1 application topically to the appropriate area as directed 2 (Two) Times a Day As Needed for Irritation or Rash., Disp: 45 g, Rfl: 1  •  citalopram (CeleXA) 10 MG tablet, Take 1 tablet by mouth Every Morning., Disp: 30 tablet, Rfl: 5        Diagnoses and all orders for this visit:    1. Essential hypertension (Primary)    2. PUD (peptic ulcer disease)    3. Invasive ductal carcinoma of breast, female, left (HCC)    4. Osteoporosis, unspecified osteoporosis type, unspecified pathological fracture presence    5. Anxiety    Other orders  -     citalopram (CeleXA) 10 MG tablet; Take 1 tablet by mouth Every Morning.  Dispense: 30 tablet; Refill: 5  -     betamethasone dipropionate 0.05 % cream; Apply 1 application topically to the appropriate area as directed 2 (Two) Times a Day As Needed for Irritation or Rash.  Dispense: 45 g; Refill: 1

## 2023-04-03 ENCOUNTER — TELEPHONE (OUTPATIENT)
Dept: ONCOLOGY | Facility: CLINIC | Age: 83
End: 2023-04-03
Payer: MEDICARE

## 2023-04-12 ENCOUNTER — OFFICE VISIT (OUTPATIENT)
Dept: INTERNAL MEDICINE | Facility: CLINIC | Age: 83
End: 2023-04-12
Payer: MEDICARE

## 2023-04-12 VITALS
HEART RATE: 62 BPM | SYSTOLIC BLOOD PRESSURE: 120 MMHG | HEIGHT: 60 IN | DIASTOLIC BLOOD PRESSURE: 76 MMHG | OXYGEN SATURATION: 97 % | BODY MASS INDEX: 21.2 KG/M2 | WEIGHT: 108 LBS

## 2023-04-12 DIAGNOSIS — J30.1 SEASONAL ALLERGIC RHINITIS DUE TO POLLEN: ICD-10-CM

## 2023-04-12 DIAGNOSIS — I10 ESSENTIAL HYPERTENSION: Primary | ICD-10-CM

## 2023-04-12 DIAGNOSIS — F41.9 ANXIETY: ICD-10-CM

## 2023-04-12 DIAGNOSIS — C50.912 INVASIVE DUCTAL CARCINOMA OF BREAST, FEMALE, LEFT: ICD-10-CM

## 2023-04-12 DIAGNOSIS — K27.9 PUD (PEPTIC ULCER DISEASE): ICD-10-CM

## 2023-04-12 PROCEDURE — 3074F SYST BP LT 130 MM HG: CPT | Performed by: INTERNAL MEDICINE

## 2023-04-12 PROCEDURE — 3078F DIAST BP <80 MM HG: CPT | Performed by: INTERNAL MEDICINE

## 2023-04-12 PROCEDURE — 99214 OFFICE O/P EST MOD 30 MIN: CPT | Performed by: INTERNAL MEDICINE

## 2023-04-12 NOTE — PROGRESS NOTES
Patient is a 83 y.o. female who is here for a follow up of hypertension.  Chief Complaint   Patient presents with   • Hypertension         HPI:    Here for mgmt of HTN and PUD.  Doing well.  BP is good.  Rare dizziness.  No HAs.  Sleeping well.  Less anxious.  Energy level is good.  No abdominal pains.      History:     Patient Active Problem List   Diagnosis   • Ataxia   • Dizziness   • Numbness and tingling of both feet   • PUD (peptic ulcer disease)   • Seasonal allergic rhinitis due to pollen   • Osteoporosis   • Essential hypertension   • Pharyngitis   • Leg edema, left   • Invasive ductal carcinoma of breast, female, left   • Anxiety       Past Medical History:   Diagnosis Date   • Aortic valve sclerosis    • Breast cancer 2009    RIGHT   • History of transfusion 1980    no reaction shadi ky   • Hx of radiation therapy 2009    RT BREAST CANCER   • Hypertension    • Osteoporosis    • Skin cancer     facial forehead       Past Surgical History:   Procedure Laterality Date   • BREAST BIOPSY Right 04/27/2009    Stereotactic biopsy   • BREAST LUMPECTOMY Right 05/26/2009   • COLONOSCOPY     • ECTOPIC PREGNANCY SURGERY     • ENDOSCOPY     • EYE SURGERY      bilateral cataract   • MASTECTOMY W/ SENTINEL NODE BIOPSY Bilateral 04/19/2022    Procedure: SIMPLE  MASTECTOMY, PROPHYLACTIC RIGHT SIMPLE MASTECTOMY, LEFT SENTINEL NODE BIOPSY;  Surgeon: Modesto Rodriguez MD;  Location: [facility];  Service: General;  Laterality: Bilateral;   • SKIN BIOPSY     • SKIN CANCER EXCISION Left 01/2023    Face- Basal Cell   • TONSILLECTOMY     • TUBAL ABDOMINAL LIGATION         Current Outpatient Medications on File Prior to Visit   Medication Sig   • anastrozole (ARIMIDEX) 1 MG tablet Take 1 tablet by mouth Daily.   • betamethasone dipropionate 0.05 % cream Apply 1 application topically to the appropriate area as directed 2 (Two) Times a Day As Needed for Irritation or Rash.   • Calcium Carb-Cholecalciferol (CALCIUM 1000 + D PO)  Take 1 tablet/day by mouth. Vitamin D 2,000 mg   • citalopram (CeleXA) 10 MG tablet Take 1 tablet by mouth Every Morning.   • denosumab (Prolia) 60 MG/ML solution prefilled syringe syringe Inject 1 mL under the skin into the appropriate area as directed 1 (One) Time. Every 6 months   • metoprolol succinate XL (TOPROL-XL) 25 MG 24 hr tablet Take 1 tablet by mouth Every Night.   • multivitamin with minerals tablet tablet Take  by mouth.     No current facility-administered medications on file prior to visit.       Family History   Problem Relation Age of Onset   • No Known Problems Mother    • Lung cancer Father    • Breast cancer Sister 67   • Dementia Brother    • Pancreatic cancer Brother    • Stroke Maternal Grandmother    • Breast cancer Cousin 40   • Ovarian cancer Neg Hx        Social History     Socioeconomic History   • Marital status:    Tobacco Use   • Smoking status: Former     Packs/day: 1.00     Years: 15.00     Pack years: 15.00     Types: Cigarettes     Quit date:      Years since quittin.3   • Smokeless tobacco: Never   Vaping Use   • Vaping Use: Never used   Substance and Sexual Activity   • Alcohol use: Yes     Alcohol/week: 1.0 standard drink     Types: 1 Glasses of wine per week   • Drug use: Never   • Sexual activity: Not Currently         Review of Systems   Constitutional: Negative for chills, fever and unexpected weight change.   HENT: Negative for congestion, ear pain, hearing loss, rhinorrhea, sinus pressure and trouble swallowing.    Eyes: Negative for discharge and itching.   Respiratory: Negative for chest tightness and shortness of breath.    Cardiovascular: Positive for leg swelling (on left). Negative for chest pain and palpitations.   Gastrointestinal: Negative for abdominal pain, blood in stool, constipation, diarrhea and vomiting.   Endocrine: Negative for polydipsia and polyuria.   Genitourinary: Negative for difficulty urinating, dysuria, enuresis, frequency,  "hematuria and urgency.        3/22 mammogram   Musculoskeletal: Negative for arthralgias, back pain, gait problem and joint swelling.        Left bunion   Skin: Negative for rash and wound.   Allergic/Immunologic: Negative for immunocompromised state.   Neurological: Negative for dizziness, syncope, weakness, light-headedness, numbness and headaches.   Hematological: Does not bruise/bleed easily.   Psychiatric/Behavioral: Negative for behavioral problems, dysphoric mood and sleep disturbance. The patient is nervous/anxious (better).        /76   Pulse 62   Ht 152.4 cm (60\")   Wt 49 kg (108 lb)   SpO2 97%   BMI 21.09 kg/m²       Physical Exam  Constitutional:       Appearance: Normal appearance. She is well-developed.   HENT:      Head: Normocephalic and atraumatic.      Right Ear: External ear normal.      Left Ear: External ear normal.      Nose: Nose normal.      Mouth/Throat:      Mouth: Mucous membranes are moist.      Pharynx: Oropharynx is clear.   Eyes:      Extraocular Movements: Extraocular movements intact.      Conjunctiva/sclera: Conjunctivae normal.      Pupils: Pupils are equal, round, and reactive to light.   Cardiovascular:      Rate and Rhythm: Normal rate and regular rhythm.      Heart sounds: Normal heart sounds.   Pulmonary:      Effort: Pulmonary effort is normal.      Breath sounds: Normal breath sounds.   Abdominal:      General: Bowel sounds are normal.      Palpations: Abdomen is soft.   Musculoskeletal:         General: Deformity (kyphosis) present. Normal range of motion.      Cervical back: Normal range of motion and neck supple.      Left lower leg: Edema present.   Lymphadenopathy:      Cervical: No cervical adenopathy.   Skin:     General: Skin is warm and dry.   Neurological:      General: No focal deficit present.      Mental Status: She is alert and oriented to person, place, and time.   Psychiatric:         Mood and Affect: Mood normal.         Behavior: Behavior normal. "         Thought Content: Thought content normal.         Procedure:      Discussion/Summary:    HTN-cont BB, advised goal of 130/80  PUD-controlled on PPI   Rhinitis-cont claritin  Osteoporosis-Advised weight bearing exercise and at least vit D 2000 IU qd, on Prolia  Anemia-recheck at goal  Abnormal glucose-counseled on diet  Breast cancer-cont Arimidex     2/16 labs noted and dw patient    Current Outpatient Medications:   •  anastrozole (ARIMIDEX) 1 MG tablet, Take 1 tablet by mouth Daily., Disp: 90 tablet, Rfl: 3  •  betamethasone dipropionate 0.05 % cream, Apply 1 application topically to the appropriate area as directed 2 (Two) Times a Day As Needed for Irritation or Rash., Disp: 45 g, Rfl: 1  •  Calcium Carb-Cholecalciferol (CALCIUM 1000 + D PO), Take 1 tablet/day by mouth. Vitamin D 2,000 mg, Disp: , Rfl:   •  citalopram (CeleXA) 10 MG tablet, Take 1 tablet by mouth Every Morning., Disp: 30 tablet, Rfl: 5  •  denosumab (Prolia) 60 MG/ML solution prefilled syringe syringe, Inject 1 mL under the skin into the appropriate area as directed 1 (One) Time. Every 6 months, Disp: , Rfl:   •  metoprolol succinate XL (TOPROL-XL) 25 MG 24 hr tablet, Take 1 tablet by mouth Every Night., Disp: 90 tablet, Rfl: 3  •  multivitamin with minerals tablet tablet, Take  by mouth., Disp: , Rfl:         Diagnoses and all orders for this visit:    1. Essential hypertension (Primary)    2. Seasonal allergic rhinitis due to pollen    3. PUD (peptic ulcer disease)    4. Invasive ductal carcinoma of breast, female, left    5. Anxiety

## 2023-04-18 ENCOUNTER — TELEPHONE (OUTPATIENT)
Dept: INTERNAL MEDICINE | Facility: CLINIC | Age: 83
End: 2023-04-18
Payer: MEDICARE

## 2023-04-18 NOTE — TELEPHONE ENCOUNTER
PT CALLED THE OFFICE AND ASKED TO GET AN APPT TO SEE  EITHER TODAY OR TOMORROW, B/C SHE SAID WHENEVER SHE SWALLOWS HER PILLS SHE FEELS LIKE ITS NOT GOING DOWN AND ITS BURNING HER THROAT, JUST HAVING THE PILLS SIT IN THERE, PLEASE ADVISE.

## 2023-04-18 NOTE — TELEPHONE ENCOUNTER
Caller: Cecile Bennett    Relationship: Self    Best call back number: [phone]    Requested Prescriptions:   CLINDOMYCIN    Pharmacy where request should be sent: [facility] [address] [place] [address] [phone] [facility] [phone] [facility]     Last office visit with prescribing clinician: 4/12/2023   Last telemedicine visit with prescribing clinician: 8/24/2023   Next office visit with prescribing clinician: 8/24/2023     Additional details provided by patient: PATIENT WAS BITTEN BY DOG, TREATED AT URGENT CARE WITH CLINDAMYCIN, SOMEONE THREW THE PATIENTS MEDS OUT ON ACCIDENT. PATIENT WANTS TO SEE IF WE CAN SEND SOME IN TO COMPLETE THE DOSAGE    Does the patient have less than a 3 day supply:  [x] Yes  [] No    Would you like a call back once the refill request has been completed: [] Yes [x] No    If the office needs to give you a call back, can they leave a voicemail: [] Yes [x] No    Eitan Patel, JANETTE   04/18/23 09:42 EDT

## 2023-04-19 ENCOUNTER — TELEPHONE (OUTPATIENT)
Dept: GASTROENTEROLOGY | Facility: CLINIC | Age: 83
End: 2023-04-19

## 2023-04-19 ENCOUNTER — TELEPHONE (OUTPATIENT)
Dept: GASTROENTEROLOGY | Facility: CLINIC | Age: 83
End: 2023-04-19
Payer: MEDICARE

## 2023-04-19 ENCOUNTER — OFFICE VISIT (OUTPATIENT)
Dept: INTERNAL MEDICINE | Facility: CLINIC | Age: 83
End: 2023-04-19
Payer: MEDICARE

## 2023-04-19 VITALS
OXYGEN SATURATION: 95 % | WEIGHT: 103 LBS | BODY MASS INDEX: 20.22 KG/M2 | SYSTOLIC BLOOD PRESSURE: 120 MMHG | DIASTOLIC BLOOD PRESSURE: 70 MMHG | HEART RATE: 70 BPM | HEIGHT: 60 IN

## 2023-04-19 DIAGNOSIS — K27.9 PUD (PEPTIC ULCER DISEASE): ICD-10-CM
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
## 2023-04-19 DIAGNOSIS — I10 ESSENTIAL HYPERTENSION: Primary | ICD-10-CM

## 2023-04-19 DIAGNOSIS — R13.13 PHARYNGEAL DYSPHAGIA: ICD-10-CM
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
## 2023-04-19 RX ORDER — DOXYCYCLINE HYCLATE 100 MG/1
100 CAPSULE ORAL 2 TIMES DAILY
Qty: 10 CAPSULE | Refills: 0 | Status: SHIPPED | OUTPATIENT
Start: 2023-04-19

## 2023-04-19 RX ORDER — CLINDAMYCIN HYDROCHLORIDE 300 MG/1
1 CAPSULE ORAL EVERY 12 HOURS SCHEDULED
COMMUNITY
Start: 2023-04-18

## 2023-04-19 RX ORDER — SULFAMETHOXAZOLE AND TRIMETHOPRIM 800; 160 MG/1; MG/1
TABLET ORAL
COMMUNITY
Start: 2023-04-15

## 2023-04-19 NOTE — PROGRESS NOTES
Patient is a 83 y.o. female who is here for difficulty swallowing.  Chief Complaint   Patient presents with   • Difficulty Swallowing         HPI:    Patient recently bitten by dog.  Rxd bactrim/clindamycin.  Difficulty tolerating , making her nausea.    Patient having progressive difficulty swallowing , feels like things getting stuck in both throat and mid chest.  Has lost weight.      History:     Patient Active Problem List   Diagnosis   • Ataxia   • Dizziness   • Numbness and tingling of both feet   • PUD (peptic ulcer disease)   • Seasonal allergic rhinitis due to pollen   • Osteoporosis   • Essential hypertension   • Pharyngitis   • Leg edema, left   • Invasive ductal carcinoma of breast, female, left   • Anxiety   • Dog bite       Past Medical History:   Diagnosis Date   • Aortic valve sclerosis    • Breast cancer 2009    RIGHT   • History of transfusion 1980    no reaction shadi ky   • Hx of radiation therapy 2009    RT BREAST CANCER   • Hypertension    • Osteoporosis    • Skin cancer     facial forehead       Past Surgical History:   Procedure Laterality Date   • BREAST BIOPSY Right 04/27/2009    Stereotactic biopsy   • BREAST LUMPECTOMY Right 05/26/2009   • COLONOSCOPY     • ECTOPIC PREGNANCY SURGERY     • ENDOSCOPY     • EYE SURGERY      bilateral cataract   • MASTECTOMY W/ SENTINEL NODE BIOPSY Bilateral 04/19/2022    Procedure: SIMPLE  MASTECTOMY, PROPHYLACTIC RIGHT SIMPLE MASTECTOMY, LEFT SENTINEL NODE BIOPSY;  Surgeon: Modesto Rodriguez MD;  Location: [facility];  Service: General;  Laterality: Bilateral;   • SKIN BIOPSY     • SKIN CANCER EXCISION Left 01/2023    Face- Basal Cell   • TONSILLECTOMY     • TUBAL ABDOMINAL LIGATION         Current Outpatient Medications on File Prior to Visit   Medication Sig   • anastrozole (ARIMIDEX) 1 MG tablet Take 1 tablet by mouth Daily.   • betamethasone dipropionate 0.05 % cream Apply 1 application topically to the appropriate area as directed 2 (Two) Times  a Day As Needed for Irritation or Rash.   • Calcium Carb-Cholecalciferol (CALCIUM 1000 + D PO) Take 1 tablet/day by mouth. Vitamin D 2,000 mg   • citalopram (CeleXA) 10 MG tablet Take 1 tablet by mouth Every Morning.   • clindamycin (CLEOCIN) 300 MG capsule Take 1 capsule by mouth Every 12 (Twelve) Hours.   • denosumab (Prolia) 60 MG/ML solution prefilled syringe syringe Inject 1 mL under the skin into the appropriate area as directed 1 (One) Time. Every 6 months   • metoprolol succinate XL (TOPROL-XL) 25 MG 24 hr tablet Take 1 tablet by mouth Every Night.   • multivitamin with minerals tablet tablet Take  by mouth.   • sulfamethoxazole-trimethoprim (BACTRIM DS,SEPTRA DS) 800-160 MG per tablet      No current facility-administered medications on file prior to visit.       Family History   Problem Relation Age of Onset   • No Known Problems Mother    • Lung cancer Father    • Breast cancer Sister 67   • Dementia Brother    • Pancreatic cancer Brother    • Stroke Maternal Grandmother    • Breast cancer Cousin 40   • Ovarian cancer Neg Hx        Social History     Socioeconomic History   • Marital status:    Tobacco Use   • Smoking status: Former     Packs/day: 1.00     Years: 15.00     Pack years: 15.00     Types: Cigarettes     Quit date:      Years since quittin.3   • Smokeless tobacco: Never   Vaping Use   • Vaping Use: Never used   Substance and Sexual Activity   • Alcohol use: Yes     Alcohol/week: 1.0 standard drink     Types: 1 Glasses of wine per week   • Drug use: Never   • Sexual activity: Not Currently         Review of Systems   Constitutional: Negative for chills, fever and unexpected weight change.   HENT: Negative for congestion, ear pain, hearing loss, rhinorrhea, sinus pressure and trouble swallowing.    Eyes: Negative for discharge and itching.   Respiratory: Negative for chest tightness and shortness of breath.    Cardiovascular: Positive for leg swelling (on left). Negative for  "chest pain and palpitations.   Gastrointestinal: Negative for abdominal pain, blood in stool, constipation, diarrhea and vomiting.   Endocrine: Negative for polydipsia and polyuria.   Genitourinary: Negative for difficulty urinating, dysuria, enuresis, frequency, hematuria and urgency.        3/22 mammogram   Musculoskeletal: Negative for arthralgias, back pain, gait problem and joint swelling.        Left bunion   Skin: Positive for wound. Negative for rash.   Allergic/Immunologic: Negative for immunocompromised state.   Neurological: Negative for dizziness, syncope, weakness, light-headedness, numbness and headaches.   Hematological: Does not bruise/bleed easily.   Psychiatric/Behavioral: Negative for behavioral problems, dysphoric mood and sleep disturbance. The patient is nervous/anxious (better).        /70 (BP Location: Left arm, Patient Position: Sitting)   Pulse 70   Ht 152.4 cm (60\")   Wt 46.7 kg (103 lb)   SpO2 95%   BMI 20.12 kg/m²       Physical Exam  Constitutional:       Appearance: Normal appearance. She is well-developed.   HENT:      Head: Normocephalic and atraumatic.      Right Ear: External ear normal.      Left Ear: External ear normal.      Nose: Nose normal.      Mouth/Throat:      Mouth: Mucous membranes are moist.      Pharynx: Oropharynx is clear.   Eyes:      Extraocular Movements: Extraocular movements intact.      Conjunctiva/sclera: Conjunctivae normal.      Pupils: Pupils are equal, round, and reactive to light.   Cardiovascular:      Rate and Rhythm: Normal rate and regular rhythm.      Heart sounds: Normal heart sounds.   Pulmonary:      Effort: Pulmonary effort is normal.      Breath sounds: Normal breath sounds.   Abdominal:      General: Bowel sounds are normal.      Palpations: Abdomen is soft.   Musculoskeletal:         General: Deformity (kyphosis) present. Normal range of motion.      Cervical back: Normal range of motion and neck supple.      Left lower leg: Edema " present.   Lymphadenopathy:      Cervical: No cervical adenopathy.   Skin:     General: Skin is warm and dry.      Comments: Right 3rd digit puncture wound times 2, mild edema at PIP, full ROM   Neurological:      General: No focal deficit present.      Mental Status: She is alert and oriented to person, place, and time.   Psychiatric:         Mood and Affect: Mood normal.         Behavior: Behavior normal.         Thought Content: Thought content normal.         Procedure:      Discussion/Summary:    HTN-cont BB, advised goal of 130/80  PUD-controlled on PPI   Rhinitis-cont claritin  Osteoporosis-Advised weight bearing exercise and at least vit D 2000 IU qd, on Prolia  Anemia-recheck at goal  Abnormal glucose-counseled on diet  Breast cancer-cont Arimidex  Right finger dog bite-will change to Doxy, call if not better  Difficulty swallowing-check EGD     2/16 labs noted and dw patient    Current Outpatient Medications:   •  anastrozole (ARIMIDEX) 1 MG tablet, Take 1 tablet by mouth Daily., Disp: 90 tablet, Rfl: 3  •  betamethasone dipropionate 0.05 % cream, Apply 1 application topically to the appropriate area as directed 2 (Two) Times a Day As Needed for Irritation or Rash., Disp: 45 g, Rfl: 1  •  Calcium Carb-Cholecalciferol (CALCIUM 1000 + D PO), Take 1 tablet/day by mouth. Vitamin D 2,000 mg, Disp: , Rfl:   •  citalopram (CeleXA) 10 MG tablet, Take 1 tablet by mouth Every Morning., Disp: 30 tablet, Rfl: 5  •  clindamycin (CLEOCIN) 300 MG capsule, Take 1 capsule by mouth Every 12 (Twelve) Hours., Disp: , Rfl:   •  denosumab (Prolia) 60 MG/ML solution prefilled syringe syringe, Inject 1 mL under the skin into the appropriate area as directed 1 (One) Time. Every 6 months, Disp: , Rfl:   •  metoprolol succinate XL (TOPROL-XL) 25 MG 24 hr tablet, Take 1 tablet by mouth Every Night., Disp: 90 tablet, Rfl: 3  •  multivitamin with minerals tablet tablet, Take  by mouth., Disp: , Rfl:   •  sulfamethoxazole-trimethoprim  (BACTRIM DS,SEPTRA DS) 800-160 MG per tablet, , Disp: , Rfl:   •  doxycycline (VIBRAMYCIN) 100 MG capsule, Take 1 capsule by mouth 2 (Two) Times a Day., Disp: 10 capsule, Rfl: 0        Diagnoses and all orders for this visit:    1. Essential hypertension (Primary)  -     CBC (No Diff)  -     TSH    2. PUD (peptic ulcer disease)  -     Ambulatory Referral to Gastroenterology  -     Comprehensive Metabolic Panel    3. Dog bite, subsequent encounter  -     doxycycline (VIBRAMYCIN) 100 MG capsule; Take 1 capsule by mouth 2 (Two) Times a Day.  Dispense: 10 capsule; Refill: 0    4. Pharyngeal dysphagia  -     Ambulatory Referral to Gastroenterology

## 2023-04-19 NOTE — TELEPHONE ENCOUNTER
UNABLE TO WARM TRANSFER CALL    Caller: Cecile Bennett    Relationship to patient: Self    Best call back number: [phone]    Patient is needing: PATIENT RETURNING CALL TO [facility] TO SCHEDULE EGD WITH DR PINEDO. PLEASE CALL PATIENT TO SCHEDULE THANK YOU

## 2023-04-24 ENCOUNTER — OUTSIDE FACILITY SERVICE (OUTPATIENT)
Dept: GASTROENTEROLOGY | Facility: CLINIC | Age: 83
End: 2023-04-24
Payer: MEDICARE

## 2023-04-24 PROCEDURE — 88305 TISSUE EXAM BY PATHOLOGIST: CPT | Performed by: INTERNAL MEDICINE

## 2023-04-24 PROCEDURE — 43249 ESOPH EGD DILATION <30 MM: CPT | Performed by: INTERNAL MEDICINE

## 2023-04-24 PROCEDURE — 43239 EGD BIOPSY SINGLE/MULTIPLE: CPT | Performed by: INTERNAL MEDICINE

## 2023-04-25 ENCOUNTER — LAB REQUISITION (OUTPATIENT)
Dept: LAB | Facility: HOSPITAL | Age: 83
End: 2023-04-25
Payer: MEDICARE

## 2023-04-25 DIAGNOSIS — K44.9 DIAPHRAGMATIC HERNIA WITHOUT OBSTRUCTION OR GANGRENE: ICD-10-CM

## 2023-04-25 DIAGNOSIS — K25.9 GASTRIC ULCER, UNSPECIFIED AS ACUTE OR CHRONIC, WITHOUT HEMORRHAGE OR PERFORATION: ICD-10-CM

## 2023-04-25 DIAGNOSIS — R13.10 DYSPHAGIA, UNSPECIFIED: ICD-10-CM

## 2023-04-25 DIAGNOSIS — K22.2 ESOPHAGEAL OBSTRUCTION: ICD-10-CM

## 2023-04-25 DIAGNOSIS — R63.4 ABNORMAL WEIGHT LOSS: ICD-10-CM

## 2023-04-25 DIAGNOSIS — K21.00 GASTRO-ESOPHAGEAL REFLUX DISEASE WITH ESOPHAGITIS, WITHOUT BLEEDING: ICD-10-CM

## 2023-04-25 RX ORDER — OMEPRAZOLE 40 MG/1
40 CAPSULE, DELAYED RELEASE ORAL 2 TIMES DAILY
Qty: 30 CAPSULE | Refills: 11 | Status: SHIPPED | OUTPATIENT
Start: 2023-04-25

## 2023-05-18 ENCOUNTER — OFFICE VISIT (OUTPATIENT)
Dept: INTERNAL MEDICINE | Facility: CLINIC | Age: 83
End: 2023-05-18
Payer: MEDICARE

## 2023-05-18 VITALS
TEMPERATURE: 97.2 F | OXYGEN SATURATION: 98 % | BODY MASS INDEX: 21.01 KG/M2 | HEART RATE: 68 BPM | SYSTOLIC BLOOD PRESSURE: 148 MMHG | DIASTOLIC BLOOD PRESSURE: 68 MMHG | WEIGHT: 107 LBS | HEIGHT: 60 IN

## 2023-05-18 DIAGNOSIS — J02.9 PHARYNGITIS, UNSPECIFIED ETIOLOGY: Primary | ICD-10-CM

## 2023-05-18 LAB
EXPIRATION DATE: NORMAL
INTERNAL CONTROL: NORMAL
Lab: NORMAL
S PYO AG THROAT QL: NEGATIVE

## 2023-05-18 PROCEDURE — 1159F MED LIST DOCD IN RCRD: CPT | Performed by: INTERNAL MEDICINE

## 2023-05-18 PROCEDURE — 87880 STREP A ASSAY W/OPTIC: CPT | Performed by: INTERNAL MEDICINE

## 2023-05-18 PROCEDURE — 1160F RVW MEDS BY RX/DR IN RCRD: CPT | Performed by: INTERNAL MEDICINE

## 2023-05-18 PROCEDURE — 99213 OFFICE O/P EST LOW 20 MIN: CPT | Performed by: INTERNAL MEDICINE

## 2023-05-18 PROCEDURE — 3078F DIAST BP <80 MM HG: CPT | Performed by: INTERNAL MEDICINE

## 2023-05-18 PROCEDURE — 3077F SYST BP >= 140 MM HG: CPT | Performed by: INTERNAL MEDICINE

## 2023-05-18 RX ORDER — ONDANSETRON 4 MG/1
4 TABLET, ORALLY DISINTEGRATING ORAL EVERY 8 HOURS PRN
Qty: 10 TABLET | Refills: 0 | Status: SHIPPED | OUTPATIENT
Start: 2023-05-18

## 2023-05-18 RX ORDER — AZITHROMYCIN 250 MG/1
TABLET, FILM COATED ORAL
Qty: 6 TABLET | Refills: 0 | Status: SHIPPED | OUTPATIENT
Start: 2023-05-18

## 2023-05-18 NOTE — PROGRESS NOTES
"Subjective   Cecile Bennett is a 83 y.o. female here for sore throat for 2 days.  She denies any fever or chills though she did wake up a bit sweaty.  She has not been exposed to anybody ill.  She says she looked in the back of her throat and it looked red and she saw some pus.  She denies significant cough. She has GI upset to many abx.    I have reviewed the patient's relevant medical history and confirmed it is accurate.    I have personally reviewed and performed the ROS. Debbie Chaidez MD     Objective   /68 (BP Location: Left arm)   Pulse 68   Temp 97.2 °F (36.2 °C)   Ht 152.4 cm (60\")   Wt 48.5 kg (107 lb)   SpO2 98%   BMI 20.90 kg/m²     Physical Exam  Vitals reviewed.   Constitutional:       Appearance: She is well-developed.   HENT:      Mouth/Throat:      Mouth: Mucous membranes are moist.      Pharynx: Uvula midline. Posterior oropharyngeal erythema present. No oropharyngeal exudate.   Pulmonary:      Effort: Pulmonary effort is normal.   Neurological:      General: No focal deficit present.      Mental Status: She is alert.   Psychiatric:         Behavior: Behavior normal.         Thought Content: Thought content normal.         Judgment: Judgment normal.           Current Outpatient Medications:   •  anastrozole (ARIMIDEX) 1 MG tablet, Take 1 tablet by mouth Daily., Disp: 90 tablet, Rfl: 3  •  betamethasone dipropionate 0.05 % cream, Apply 1 application topically to the appropriate area as directed 2 (Two) Times a Day As Needed for Irritation or Rash., Disp: 45 g, Rfl: 1  •  Calcium Carb-Cholecalciferol (CALCIUM 1000 + D PO), Take 1 tablet/day by mouth. Vitamin D 2,000 mg, Disp: , Rfl:   •  citalopram (CeleXA) 10 MG tablet, Take 1 tablet by mouth Every Morning., Disp: 30 tablet, Rfl: 5  •  clindamycin (CLEOCIN) 300 MG capsule, Take 1 capsule by mouth Every 12 (Twelve) Hours., Disp: , Rfl:   •  denosumab (Prolia) 60 MG/ML solution prefilled syringe syringe, Inject 1 mL " under the skin into the appropriate area as directed 1 (One) Time. Every 6 months, Disp: , Rfl:   •  metoprolol succinate XL (TOPROL-XL) 25 MG 24 hr tablet, Take 1 tablet by mouth Every Night., Disp: 90 tablet, Rfl: 3  •  multivitamin with minerals tablet tablet, Take  by mouth., Disp: , Rfl:   •  omeprazole (priLOSEC) 40 MG capsule, Take 1 capsule by mouth 2 (Two) Times a Day., Disp: 30 capsule, Rfl: 11    Assessment & Plan   Diagnoses and all orders for this visit:    1. Pharyngitis, unspecified etiology (Primary)  -     POCT rapid strep A: Negative though throat is extremely erythematous and I am not convinced that she does not have strep so we will treat as such  -     azithromycin (Zithromax Z-Miko) 250 MG tablet; Take 2 tablets the first day, then 1 tablet daily for 4 days.  Dispense: 6 tablet; Refill: 0  Advised her to eat yogurt daily while taking this medication to help with GI upset  -  Other orders  -     ondansetron ODT (ZOFRAN-ODT) 4 MG disintegrating tablet; Place 1 tablet on the tongue Every 8 (Eight) Hours As Needed for Nausea or Vomiting.  Dispense: 10 tablet; Refill: 0             Debbie Chaidez MD

## 2023-06-01 ENCOUNTER — OUTSIDE FACILITY SERVICE (OUTPATIENT)
Dept: GASTROENTEROLOGY | Facility: CLINIC | Age: 83
End: 2023-06-01
Payer: MEDICARE

## 2023-06-01 PROCEDURE — 43249 ESOPH EGD DILATION <30 MM: CPT | Performed by: INTERNAL MEDICINE

## 2023-06-01 PROCEDURE — 43239 EGD BIOPSY SINGLE/MULTIPLE: CPT | Performed by: INTERNAL MEDICINE

## 2023-06-02 PROBLEM — K22.2 ESOPHAGEAL STRICTURE: Status: ACTIVE | Noted: 2023-06-02

## 2023-07-04 NOTE — TELEPHONE ENCOUNTER
I called and spoke to pt and she needs at least 7 dental implants and she was hoping that we could send a letter to [facility] stating that this is medically necessary due to the hormone blockade affecting her bone density.  We had been talking about setting her up for prolia for her osteoporosis since she has had a dexa.  She has only been on the anastrozole since May and had the Dexa in July.  She does report that she took fosamax for 5 years and had been done with that for a few years from her pcp.  I talked with Dodie Martínez and Dodie stated that patient just needs to have her dental work done but that we cannot write a letter for the dental work as that is not directly in connection with her breast cancer.  Dodie said that patient does not need to do treatment for her osteoporosis right now until the dental work is completed.  I told her that we would discuss at her February appt.  Pt verbalized understanding.     ED MD

## 2023-08-15 ENCOUNTER — LAB (OUTPATIENT)
Dept: LAB | Facility: HOSPITAL | Age: 83
End: 2023-08-15
Payer: MEDICARE

## 2023-08-15 ENCOUNTER — HOSPITAL ENCOUNTER (OUTPATIENT)
Dept: ONCOLOGY | Facility: HOSPITAL | Age: 83
Discharge: HOME | End: 2023-08-15
Payer: MEDICARE

## 2023-08-15 ENCOUNTER — OFFICE VISIT (OUTPATIENT)
Dept: ONCOLOGY | Facility: CLINIC | Age: 83
End: 2023-08-15
Payer: MEDICARE

## 2023-08-15 VITALS
SYSTOLIC BLOOD PRESSURE: 160 MMHG | HEART RATE: 57 BPM | RESPIRATION RATE: 16 BRPM | TEMPERATURE: 97.3 F | WEIGHT: 106 LBS | DIASTOLIC BLOOD PRESSURE: 69 MMHG | OXYGEN SATURATION: 98 % | HEIGHT: 60 IN | BODY MASS INDEX: 20.81 KG/M2

## 2023-08-15 DIAGNOSIS — M81.0 OSTEOPOROSIS, UNSPECIFIED OSTEOPOROSIS TYPE, UNSPECIFIED PATHOLOGICAL FRACTURE PRESENCE: Primary | ICD-10-CM

## 2023-08-15 DIAGNOSIS — M81.0 OSTEOPOROSIS, UNSPECIFIED OSTEOPOROSIS TYPE, UNSPECIFIED PATHOLOGICAL FRACTURE PRESENCE: ICD-10-CM

## 2023-08-15 DIAGNOSIS — C50.912 INVASIVE DUCTAL CARCINOMA OF BREAST, FEMALE, LEFT: ICD-10-CM

## 2023-08-15 LAB
ALBUMIN SERPL-MCNC: 4 G/DL (ref 3.5–5.2)
ALBUMIN/GLOB SERPL: 1.4 G/DL
ALP SERPL-CCNC: 76 U/L (ref 39–117)
ALT SERPL W P-5'-P-CCNC: 16 U/L (ref 1–33)
ANION GAP SERPL CALCULATED.3IONS-SCNC: 9 MMOL/L (ref 5–15)
AST SERPL-CCNC: 30 U/L (ref 1–32)
BASOPHILS # BLD AUTO: 0.03 10*3/MM3 (ref 0–0.2)
BASOPHILS NFR BLD AUTO: 0.5 % (ref 0–1.5)
BILIRUB SERPL-MCNC: 0.6 MG/DL (ref 0–1.2)
BUN SERPL-MCNC: 14 MG/DL (ref 8–23)
BUN/CREAT SERPL: 15.1 (ref 7–25)
CALCIUM SPEC-SCNC: 9.6 MG/DL (ref 8.6–10.5)
CHLORIDE SERPL-SCNC: 106 MMOL/L (ref 98–107)
CO2 SERPL-SCNC: 27 MMOL/L (ref 22–29)
CREAT SERPL-MCNC: 0.93 MG/DL (ref 0.57–1)
DEPRECATED RDW RBC AUTO: 46.5 FL (ref 37–54)
EGFRCR SERPLBLD CKD-EPI 2021: 61.9 ML/MIN/1.73
EOSINOPHIL # BLD AUTO: 0.27 10*3/MM3 (ref 0–0.4)
EOSINOPHIL NFR BLD AUTO: 4.1 % (ref 0.3–6.2)
ERYTHROCYTE [DISTWIDTH] IN BLOOD BY AUTOMATED COUNT: 13 % (ref 12.3–15.4)
GLOBULIN UR ELPH-MCNC: 2.9 GM/DL
GLUCOSE SERPL-MCNC: 93 MG/DL (ref 65–99)
HCT VFR BLD AUTO: 41.3 % (ref 34–46.6)
HGB BLD-MCNC: 13.3 G/DL (ref 12–15.9)
IMM GRANULOCYTES # BLD AUTO: 0.01 10*3/MM3 (ref 0–0.05)
IMM GRANULOCYTES NFR BLD AUTO: 0.2 % (ref 0–0.5)
LYMPHOCYTES # BLD AUTO: 1.68 10*3/MM3 (ref 0.7–3.1)
LYMPHOCYTES NFR BLD AUTO: 25.4 % (ref 19.6–45.3)
MAGNESIUM SERPL-MCNC: 2 MG/DL (ref 1.6–2.4)
MCH RBC QN AUTO: 30.4 PG (ref 26.6–33)
MCHC RBC AUTO-ENTMCNC: 32.2 G/DL (ref 31.5–35.7)
MCV RBC AUTO: 94.3 FL (ref 79–97)
MONOCYTES # BLD AUTO: 0.52 10*3/MM3 (ref 0.1–0.9)
MONOCYTES NFR BLD AUTO: 7.9 % (ref 5–12)
NEUTROPHILS NFR BLD AUTO: 4.1 10*3/MM3 (ref 1.7–7)
NEUTROPHILS NFR BLD AUTO: 61.9 % (ref 42.7–76)
PHOSPHATE SERPL-MCNC: 3.3 MG/DL (ref 2.5–4.5)
PLATELET # BLD AUTO: 185 10*3/MM3 (ref 140–450)
PMV BLD AUTO: 11 FL (ref 6–12)
POTASSIUM SERPL-SCNC: 4.6 MMOL/L (ref 3.5–5.2)
PROT SERPL-MCNC: 6.9 G/DL (ref 6–8.5)
RBC # BLD AUTO: 4.38 10*6/MM3 (ref 3.77–5.28)
SODIUM SERPL-SCNC: 142 MMOL/L (ref 136–145)
WBC NRBC COR # BLD: 6.61 10*3/MM3 (ref 3.4–10.8)

## 2023-08-15 PROCEDURE — 84100 ASSAY OF PHOSPHORUS: CPT

## 2023-08-15 PROCEDURE — 99213 OFFICE O/P EST LOW 20 MIN: CPT | Performed by: NURSE PRACTITIONER

## 2023-08-15 PROCEDURE — 36415 COLL VENOUS BLD VENIPUNCTURE: CPT

## 2023-08-15 PROCEDURE — 1126F AMNT PAIN NOTED NONE PRSNT: CPT | Performed by: NURSE PRACTITIONER

## 2023-08-15 PROCEDURE — 80053 COMPREHEN METABOLIC PANEL: CPT

## 2023-08-15 PROCEDURE — 3077F SYST BP >= 140 MM HG: CPT | Performed by: NURSE PRACTITIONER

## 2023-08-15 PROCEDURE — 1160F RVW MEDS BY RX/DR IN RCRD: CPT | Performed by: NURSE PRACTITIONER

## 2023-08-15 PROCEDURE — 96372 THER/PROPH/DIAG INJ SC/IM: CPT

## 2023-08-15 PROCEDURE — 1159F MED LIST DOCD IN RCRD: CPT | Performed by: NURSE PRACTITIONER

## 2023-08-15 PROCEDURE — 3078F DIAST BP <80 MM HG: CPT | Performed by: NURSE PRACTITIONER

## 2023-08-15 PROCEDURE — 83735 ASSAY OF MAGNESIUM: CPT

## 2023-08-15 PROCEDURE — 85025 COMPLETE CBC W/AUTO DIFF WBC: CPT

## 2023-08-15 PROCEDURE — 25010000002 DENOSUMAB 60 MG/ML SOLUTION PREFILLED SYRINGE: Performed by: NURSE PRACTITIONER

## 2023-08-15 RX ADMIN — DENOSUMAB 60 MG: 60 INJECTION SUBCUTANEOUS at 10:53

## 2023-08-15 NOTE — PROGRESS NOTES
"        PROBLEM LIST:  1. [id number] ER+ (95%) MI+ (95%) Her2 negative (1+) invasive ductal carcinoma of the left breast  A) bilateral mastectomy on 4/19/22.  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  B) anastrozole started May 2022  2. History of right breast cancer  3. Hypertension  4. osteoporosis    Subjective     CHIEF COMPLAINT: breast cancer    HISTORY OF PRESENT ILLNESS:   Cecile Bennett returns for follow-up.  She continues on anastrozole.  She is tolerating it well.  She has noted occasional hot flashes and night sweats.  She denies any arthralgias.    She has been seen by Dr. Mahmood for esophagitis and esophageal strictures.  She was recently started on omeprazole and is having no further difficulty with reflux or difficulty swallowing.  She has started gaining some weight.  She had dropped down to 97 pounds.  She is now up to 106 pounds today.        Objective      /69   Pulse 57   Temp 97.3 øF (36.3 øC)   Resp 16   Ht 152.4 cm (60\")   Wt 48.1 kg (106 lb)   SpO2 98%   BMI 20.70 kg/mý    Vitals:    08/15/23 0959   PainSc: 0-No pain                 ECOG score: 2             General: well appearing female in no acute distress  Neuro: alert and oriented  HEENT: sclera anicteric, oropharynx clear  Lymphatics: no cervical, supraclavicular, or axillary adenopathy  Cardiovascular: regular rate and rhythm, no murmurs  Lungs: clear to auscultation bilaterally  Breast: Bilateral mastectomy incisions well-healed with no masses or skin changes  Abdomen: soft, nontender, nondistended.  No palpable organomegaly  Extremeties: no lower extremity edema  Skin: no rashes, lesions, bruising, or petechiae  Psych: mood and affect appropriate    I have reexamined the patient and the results are consistent with the previously documented exam. ADELE [facility]        RECENT LABS:  Lab Results   Component Value Date    WBC 6.61 08/15/2023    HGB 13.3 08/15/2023    HCT 41.3 " 08/15/2023    MCV 94.3 08/15/2023     08/15/2023       Lab Results   Component Value Date    GLUCOSE 95 02/16/2023    BUN 14 02/16/2023    CREATININE 0.74 02/16/2023    EGFRIFNONA 62 07/14/2021    BCR 18.9 02/16/2023    K 4.2 02/16/2023    CO2 27.0 02/16/2023    CALCIUM 9.4 02/16/2023    ALBUMIN 4.3 02/16/2023    AST 37 (H) 02/16/2023    ALT 21 02/16/2023       XR Chest PA & Lateral  Narrative: DATE OF EXAM: 12/28/2022 3:06 PM     PROCEDURE: XR CHEST PA AND LATERAL-     INDICATIONS: pre-op; Z01.810-Encounter for preprocedural cardiovascular  examination     COMPARISON: No comparisons available.     TECHNIQUE: Two radiologic views of the chest, PA and lateral, were  obtained.     FINDINGS:  Scoliosis. Heart size and pulmonary vasculature are within normal  limits. Lungs hyperinflated. Lungs clear. Costophrenic angles sharp      Impression: Scoliosis and COPD with no acute cardiopulmonary process demonstrated     This report was finalized on 12/28/2022 6:49 PM by Phan Leyva.                ASSESSMENT AND PLAN:     Cecile Bennett is a 83 y.o. female  with a T2N0M0 ER+ Her2 negative IDC of the left breast.     She began anastrozole early May 2022 and is tolerating it well.  We will continue this for a minimum of 5 years.      Osteoporosis: Bone density from 7/14/2022 showed osteoporosis.  Started Prolia 2/16/2023.  She will receive cycle 2 of Prolia today.  She will need a repeat bone density scan July 2024.      Follow-up 6 months.                      Tata Martínez APRN  [facility]    8/15/2023          CC:

## 2023-08-24 ENCOUNTER — OFFICE VISIT (OUTPATIENT)
Dept: INTERNAL MEDICINE | Facility: CLINIC | Age: 83
End: 2023-08-24
Payer: MEDICARE

## 2023-08-24 VITALS
OXYGEN SATURATION: 96 % | HEART RATE: 55 BPM | HEIGHT: 60 IN | SYSTOLIC BLOOD PRESSURE: 140 MMHG | DIASTOLIC BLOOD PRESSURE: 74 MMHG | WEIGHT: 106 LBS | BODY MASS INDEX: 20.81 KG/M2

## 2023-08-24 DIAGNOSIS — Z23 NEED FOR PROPHYLACTIC VACCINATION AGAINST STREPTOCOCCUS PNEUMONIAE (PNEUMOCOCCUS): ICD-10-CM

## 2023-08-24 DIAGNOSIS — M81.0 OSTEOPOROSIS, UNSPECIFIED OSTEOPOROSIS TYPE, UNSPECIFIED PATHOLOGICAL FRACTURE PRESENCE: ICD-10-CM

## 2023-08-24 DIAGNOSIS — Z00.00 ENCOUNTER FOR MEDICARE ANNUAL WELLNESS EXAM: ICD-10-CM

## 2023-08-24 DIAGNOSIS — F41.9 ANXIETY: ICD-10-CM

## 2023-08-24 DIAGNOSIS — Z00.00 ROUTINE GENERAL MEDICAL EXAMINATION AT A HEALTH CARE FACILITY: ICD-10-CM

## 2023-08-24 DIAGNOSIS — I10 ESSENTIAL HYPERTENSION: Primary | ICD-10-CM

## 2023-08-24 RX ORDER — DOXYCYCLINE HYCLATE 100 MG/1
100 CAPSULE ORAL 2 TIMES DAILY
Qty: 20 CAPSULE | Refills: 0 | Status: SHIPPED | OUTPATIENT
Start: 2023-08-24

## 2023-08-24 RX ORDER — CITALOPRAM HYDROBROMIDE 10 MG/1
10 TABLET ORAL EVERY MORNING
Qty: 90 TABLET | Refills: 3 | Status: SHIPPED | OUTPATIENT
Start: 2023-08-24

## 2023-08-24 NOTE — PROGRESS NOTES
The ABCs of the Annual Wellness Visit  Subsequent Medicare Wellness Visit    Chief Complaint   Patient presents with    Annual Exam      Subjective    History of Present Illness:  Cecile Bennett is a 83 y.o. female who presents for a Subsequent Medicare Wellness Visit.    The following portions of the patient's history were reviewed and   updated as appropriate: current medications, past family history, past medical history, past social history, past surgical history, and problem list.     Compared to one year ago, the patient feels her physical   health is better.    Compared to one year ago, the patient feels her mental   health is better.    Recent Hospitalizations:  She was not admitted to the hospital during the last year.       Current Medical Providers:  Patient Care Team:  Leon Oliveros MD as PCP - General (Internal Medicine)  Luli Moreno APRN as Nurse Practitioner (Nurse Practitioner)  Modesto Rodriguez MD as Consulting Physician (General Surgery)  Liza Guerra MD as Consulting Physician (Hematology and Oncology)    Outpatient Medications Prior to Visit   Medication Sig Dispense Refill    anastrozole (ARIMIDEX) 1 MG tablet Take 1 tablet by mouth Daily. 90 tablet 3    betamethasone dipropionate 0.05 % cream Apply 1 application topically to the appropriate area as directed 2 (Two) Times a Day As Needed for Irritation or Rash. 45 g 1    Calcium Carb-Cholecalciferol (CALCIUM 1000 + D PO) Take 1 tablet/day by mouth. Vitamin D 2,000 mg      denosumab (Prolia) 60 MG/ML solution prefilled syringe syringe Inject 1 mL under the skin into the appropriate area as directed 1 (One) Time. Every 6 months      metoprolol succinate XL (TOPROL-XL) 25 MG 24 hr tablet Take 1 tablet by mouth Every Night. 90 tablet 3    omeprazole (priLOSEC) 40 MG capsule Take 1 capsule by mouth 2 (Two) Times a Day. 30 capsule 11    ondansetron ODT (ZOFRAN-ODT) 4 MG disintegrating tablet Place 1 tablet on the  tongue Every 8 (Eight) Hours As Needed for Nausea or Vomiting. 10 tablet 0    citalopram (CeleXA) 10 MG tablet Take 1 tablet by mouth Every Morning. 30 tablet 5     No facility-administered medications prior to visit.       No opioid medication identified on active medication list. I have reviewed chart for other potential  high risk medication/s and harmful drug interactions in the elderly.        Aspirin is not on active medication list.  Aspirin use is not indicated based on review of current medical condition/s. Risk of harm outweighs potential benefits.  .    Fall Risk Assessment was completed, and patient is at low risk for falls.      Patient Active Problem List   Diagnosis    Ataxia    Dizziness    Numbness and tingling of both feet    PUD (peptic ulcer disease)    Seasonal allergic rhinitis due to pollen    Osteoporosis    Essential hypertension    Pharyngitis    Leg edema, left    Invasive ductal carcinoma of breast, female, left    Anxiety    Dog bite    Esophageal stricture     Advance Care Planning   Advance Directive is not on file.  ACP discussion was held with the patient during this visit. Patient does not have an advance directive, information provided.    Review of Systems   Constitutional:  Negative for chills, fever and unexpected weight change.   HENT:  Negative for congestion, ear pain, hearing loss, rhinorrhea, sinus pressure and trouble swallowing.    Eyes:  Negative for discharge and itching.   Respiratory:  Negative for chest tightness and shortness of breath.    Cardiovascular:  Positive for leg swelling (on left). Negative for chest pain and palpitations.   Gastrointestinal:  Negative for abdominal pain, blood in stool, constipation, diarrhea and vomiting.   Endocrine: Negative for polydipsia and polyuria.   Genitourinary:  Negative for difficulty urinating, dysuria, enuresis, frequency, hematuria and urgency.        3/22 mammogram   Musculoskeletal:  Negative for arthralgias, back pain,  "gait problem and joint swelling.        Left bunion   Skin:  Positive for wound. Negative for rash.   Allergic/Immunologic: Negative for immunocompromised state.   Neurological:  Negative for dizziness, syncope, weakness, light-headedness, numbness and headaches.   Hematological:  Does not bruise/bleed easily.   Psychiatric/Behavioral:  Negative for behavioral problems, dysphoric mood and sleep disturbance. The patient is nervous/anxious (better).        Objective       Vitals:    08/24/23 0953 08/24/23 1022   BP: 120/70 140/74   BP Location: Left arm    Patient Position: Sitting    Pulse: 55    SpO2: 96%    Weight: 48.1 kg (106 lb)    Height: 152.4 cm (60\")      BMI Readings from Last 1 Encounters:   08/24/23 20.70 kg/mý   BMI is within normal parameters. No follow-up required.    Does the patient have evidence of cognitive impairment? No    Physical Exam  Constitutional:       Appearance: Normal appearance. She is well-developed.   HENT:      Head: Normocephalic and atraumatic.      Right Ear: External ear normal.      Left Ear: External ear normal.      Nose: Nose normal.      Mouth/Throat:      Mouth: Mucous membranes are moist.      Pharynx: Oropharynx is clear.   Eyes:      Extraocular Movements: Extraocular movements intact.      Conjunctiva/sclera: Conjunctivae normal.      Pupils: Pupils are equal, round, and reactive to light.   Cardiovascular:      Rate and Rhythm: Normal rate and regular rhythm.      Heart sounds: Normal heart sounds.   Pulmonary:      Effort: Pulmonary effort is normal.      Breath sounds: Normal breath sounds.   Abdominal:      General: Bowel sounds are normal.      Palpations: Abdomen is soft.   Musculoskeletal:         General: Deformity (kyphosis) present. Normal range of motion.      Cervical back: Normal range of motion and neck supple.      Left lower leg: Edema present.   Lymphadenopathy:      Cervical: No cervical adenopathy.   Skin:     General: Skin is warm and dry. "   Neurological:      General: No focal deficit present.      Mental Status: She is alert and oriented to person, place, and time.   Psychiatric:         Mood and Affect: Mood normal.         Behavior: Behavior normal.         Thought Content: Thought content normal.               HEALTH RISK ASSESSMENT    Smoking Status:  Social History     Tobacco Use   Smoking Status Former    Packs/day: 1.00    Years: 15.00    Pack years: 15.00    Types: Cigarettes    Quit date:     Years since quittin.6    Passive exposure: Never   Smokeless Tobacco Never     Alcohol Consumption:  Social History     Substance and Sexual Activity   Alcohol Use Yes    Alcohol/week: 1.0 standard drink    Types: 1 Glasses of wine per week     Fall Risk Screen:    [facility] Fall Risk Assessment was completed, and patient is at MODERATE risk for falls. Assessment completed on:2023    Depression Screenin/24/2023     9:00 AM   PHQ-2/PHQ-9 Depression Screening   Little Interest or Pleasure in Doing Things 0-->not at all   Feeling Down, Depressed or Hopeless 0-->not at all   PHQ-9: Brief Depression Severity Measure Score 0       Health Habits and Functional and Cognitive Screenin/24/2023     9:00 AM   Functional & Cognitive Status   Do you have difficulty preparing food and eating? No   Do you have difficulty bathing yourself, getting dressed or grooming yourself? No   Do you have difficulty using the toilet? No   Do you have difficulty moving around from place to place? No   Do you have trouble with steps or getting out of a bed or a chair? No   Current Diet Limited Junk Food   Dental Exam Up to date   Eye Exam Up to date   Exercise (times per week) 2 times per week   Current Exercises Include Walking   Do you need help using the phone?  No   Are you deaf or do you have serious difficulty hearing?  No   Do you need help to go to places out of walking distance? No   Do you need help shopping? No   Do you need help preparing  meals?  No   Do you need help with housework?  No   Do you need help with laundry? No   Do you need help taking your medications? No   Do you need help managing money? No   Do you ever drive or ride in a car without wearing a seat belt? No   Have you felt unusual stress, anger or loneliness in the last month? No   Who do you live with? Alone   If you need help, do you have trouble finding someone available to you? No   Have you been bothered in the last four weeks by sexual problems? No   Do you have difficulty concentrating, remembering or making decisions? No       Age-appropriate Screening Schedule:  Refer to the list below for future screening recommendations based on patient's age, sex and/or medical conditions. Orders for these recommended tests are listed in the plan section. The patient has been provided with a written plan.    Health Maintenance   Topic Date Due    TDAP/TD VACCINES (1 - Tdap) Never done    ZOSTER VACCINE (1 of 2) Never done    COVID-19 Vaccine (6 - Pfizer series) 02/21/2023    INFLUENZA VACCINE  10/01/2023    DXA SCAN  07/14/2024    ANNUAL WELLNESS VISIT  08/24/2024    Pneumococcal Vaccine 65+  Completed              Assessment & Plan     CMS Preventative Services Quick Reference  Risk Factors Identified During Encounter  Immunizations Discussed/Encouraged: Td, Influenza, Prevnar 20 (Pneumococcal 20-valent conjugate), Shingrix, and COVID19  Polypharmacy: Medication List reviewed and Medications are appropriate for patient  The above risks/problems have been discussed with the patient.  Follow up actions/plans if indicated are seen below in the Assessment/Plan Section.  Pertinent information has been shared with the patient in the After Visit Summary.    Diagnoses and all orders for this visit:    1. Essential hypertension (Primary)    2. Osteoporosis, unspecified osteoporosis type, unspecified pathological fracture presence    3. Encounter for Medicare annual wellness exam    4. Routine  general medical examination at a health care facility    5. Anxiety  -     citalopram (CeleXA) 10 MG tablet; Take 1 tablet by mouth Every Morning.  Dispense: 90 tablet; Refill: 3    6. Need for prophylactic vaccination against Streptococcus pneumoniae (pneumococcus)  -     Pneumococcal Conjugate Vaccine 20-Valent (PCV20)    Other orders  -     doxycycline (VIBRAMYCIN) 100 MG capsule; Take 1 capsule by mouth 2 (Two) Times a Day.  Dispense: 20 capsule; Refill: 0        Follow Up:   Return in about 6 months (around 2/24/2024) for Recheck.     An After Visit Summary and PPPS were given to the patient.           HME-counseled on diet and exercise  HTN-cont BB, advised goal of 130/80  PUD-controlled on PPI   Rhinitis-cont claritin  Osteoporosis-Advised weight bearing exercise and at least vit D 2000 IU qd, on Prolia  Anemia-recheck at goal  Abnormal glucose-counseled on diet  Breast cancer-cont Arimidex     prior labs noted and dw patient    Reviewed the following with the patient: advised patient to avoid alcoholic beverages and encouraged patient to exercise 5-7 days per week for 30 minutes at a time.

## 2023-10-24 ENCOUNTER — TELEPHONE (OUTPATIENT)
Dept: GASTROENTEROLOGY | Facility: CLINIC | Age: 83
End: 2023-10-24
Payer: MEDICARE

## 2023-10-24 RX ORDER — OMEPRAZOLE 40 MG/1
40 CAPSULE, DELAYED RELEASE ORAL 2 TIMES DAILY
Qty: 180 CAPSULE | Refills: 0 | Status: SHIPPED | OUTPATIENT
Start: 2023-10-24

## 2023-10-24 NOTE — TELEPHONE ENCOUNTER
Caller: ALICE BAKER    Best call back number: [phone]    Requested Prescriptions: OMEPRAZOLE      Pharmacy where request should be sent:    [facility] - [place], [place] - [address] - [phone] PH - [phone] FX  [address] [facility] [address]  Phone: [phone]  Fax: [phone]     Last office visit with prescribing clinician: Visit date not found   Last telemedicine visit with prescribing clinician: Visit date not found   Next office visit with prescribing clinician: Visit date not found     Additional details provided by patient: PATIENT HAS NO PRESCRIPTION LEFT    Does the patient have less than a 3 day supply:  [x] Yes  [] No    Would you like a call back once the refill request has been completed: [x] Yes [] No    If the office needs to give you a call back, can they leave a voicemail: [x] Yes [] No    Lela Garrett Rep   10/24/23 14:20 EDT

## 2024-01-22 RX ORDER — OMEPRAZOLE 40 MG/1
40 CAPSULE, DELAYED RELEASE ORAL 2 TIMES DAILY
Qty: 180 CAPSULE | Refills: 0 | Status: SHIPPED | OUTPATIENT
Start: 2024-01-22

## 2024-02-05 DIAGNOSIS — M81.0 OSTEOPOROSIS, UNSPECIFIED OSTEOPOROSIS TYPE, UNSPECIFIED PATHOLOGICAL FRACTURE PRESENCE: Primary | ICD-10-CM

## 2024-02-05 DIAGNOSIS — C50.912 INVASIVE DUCTAL CARCINOMA OF BREAST, FEMALE, LEFT: ICD-10-CM

## 2024-02-05 NOTE — TELEPHONE ENCOUNTER
Caller: Sabrina Cecile Dunn    Relationship: Self    Best call back number:  [phone]    Requested Prescriptions:   Requested Prescriptions     Pending Prescriptions Disp Refills    anastrozole (ARIMIDEX) 1 MG tablet 90 tablet 3     Sig: Take 1 tablet by mouth Daily.        Pharmacy where request should be sent:      [facility] [address] [place] [address] - [phone] [facility] [phone]  [phone]     Last office visit with prescribing clinician: Visit date not found   Last telemedicine visit with prescribing clinician: Visit date not found   Next office visit with prescribing clinician: 2/14/2024     Additional details provided by patient:     Does the patient have less than a 3 day supply:  [] Yes  [x] No    Would you like a call back once the refill request has been completed: [] Yes [x] No    If the office needs to give you a call back, can they leave a voicemail: [] Yes [x] No

## 2024-02-06 RX ORDER — ANASTROZOLE 1 MG/1
1 TABLET ORAL DAILY
Qty: 90 TABLET | Refills: 3 | Status: SHIPPED | OUTPATIENT
Start: 2024-02-06

## 2024-02-14 ENCOUNTER — OFFICE VISIT (OUTPATIENT)
Dept: ONCOLOGY | Facility: CLINIC | Age: 84
End: 2024-02-14
Payer: MEDICARE

## 2024-02-14 ENCOUNTER — HOSPITAL ENCOUNTER (OUTPATIENT)
Dept: ONCOLOGY | Facility: HOSPITAL | Age: 84
Discharge: HOME | End: 2024-02-14
Admitting: NURSE PRACTITIONER
Payer: MEDICARE

## 2024-02-14 VITALS
DIASTOLIC BLOOD PRESSURE: 68 MMHG | TEMPERATURE: 98.2 F | SYSTOLIC BLOOD PRESSURE: 190 MMHG | WEIGHT: 108 LBS | RESPIRATION RATE: 16 BRPM | HEIGHT: 60 IN | BODY MASS INDEX: 21.2 KG/M2 | OXYGEN SATURATION: 98 % | HEART RATE: 56 BPM

## 2024-02-14 DIAGNOSIS — M81.0 OSTEOPOROSIS, UNSPECIFIED OSTEOPOROSIS TYPE, UNSPECIFIED PATHOLOGICAL FRACTURE PRESENCE: ICD-10-CM

## 2024-02-14 DIAGNOSIS — M81.0 AGE-RELATED OSTEOPOROSIS WITHOUT CURRENT PATHOLOGICAL FRACTURE: Primary | ICD-10-CM

## 2024-02-14 DIAGNOSIS — M81.0 OSTEOPOROSIS, UNSPECIFIED OSTEOPOROSIS TYPE, UNSPECIFIED PATHOLOGICAL FRACTURE PRESENCE: Primary | ICD-10-CM

## 2024-02-14 LAB
ALBUMIN SERPL-MCNC: 3.8 G/DL (ref 3.5–5.2)
ALBUMIN/GLOB SERPL: 1.4 G/DL
ALP SERPL-CCNC: 67 U/L (ref 39–117)
ALT SERPL W P-5'-P-CCNC: 14 U/L (ref 1–33)
ANION GAP SERPL CALCULATED.3IONS-SCNC: 9 MMOL/L (ref 5–15)
AST SERPL-CCNC: 28 U/L (ref 1–32)
BASOPHILS # BLD AUTO: 0.01 10*3/MM3 (ref 0–0.2)
BASOPHILS NFR BLD AUTO: 0.2 % (ref 0–1.5)
BILIRUB SERPL-MCNC: 0.4 MG/DL (ref 0–1.2)
BUN SERPL-MCNC: 11 MG/DL (ref 8–23)
BUN/CREAT SERPL: 13.3 (ref 7–25)
CALCIUM SPEC-SCNC: 8.9 MG/DL (ref 8.6–10.5)
CHLORIDE SERPL-SCNC: 106 MMOL/L (ref 98–107)
CO2 SERPL-SCNC: 27 MMOL/L (ref 22–29)
CREAT SERPL-MCNC: 0.83 MG/DL (ref 0.57–1)
DEPRECATED RDW RBC AUTO: 47 FL (ref 37–54)
EGFRCR SERPLBLD CKD-EPI 2021: 70.9 ML/MIN/1.73
EOSINOPHIL # BLD AUTO: 0.18 10*3/MM3 (ref 0–0.4)
EOSINOPHIL NFR BLD AUTO: 3.2 % (ref 0.3–6.2)
ERYTHROCYTE [DISTWIDTH] IN BLOOD BY AUTOMATED COUNT: 13.6 % (ref 12.3–15.4)
GLOBULIN UR ELPH-MCNC: 2.8 GM/DL
GLUCOSE SERPL-MCNC: 69 MG/DL (ref 65–99)
HCT VFR BLD AUTO: 40.6 % (ref 34–46.6)
HGB BLD-MCNC: 13.2 G/DL (ref 12–15.9)
IMM GRANULOCYTES # BLD AUTO: 0.01 10*3/MM3 (ref 0–0.05)
IMM GRANULOCYTES NFR BLD AUTO: 0.2 % (ref 0–0.5)
LYMPHOCYTES # BLD AUTO: 1.38 10*3/MM3 (ref 0.7–3.1)
LYMPHOCYTES NFR BLD AUTO: 24.6 % (ref 19.6–45.3)
MAGNESIUM SERPL-MCNC: 2.2 MG/DL (ref 1.6–2.4)
MCH RBC QN AUTO: 30.2 PG (ref 26.6–33)
MCHC RBC AUTO-ENTMCNC: 32.5 G/DL (ref 31.5–35.7)
MCV RBC AUTO: 92.9 FL (ref 79–97)
MONOCYTES # BLD AUTO: 0.63 10*3/MM3 (ref 0.1–0.9)
MONOCYTES NFR BLD AUTO: 11.2 % (ref 5–12)
NEUTROPHILS NFR BLD AUTO: 3.41 10*3/MM3 (ref 1.7–7)
NEUTROPHILS NFR BLD AUTO: 60.6 % (ref 42.7–76)
PHOSPHATE SERPL-MCNC: 3.2 MG/DL (ref 2.5–4.5)
PLATELET # BLD AUTO: 142 10*3/MM3 (ref 140–450)
PMV BLD AUTO: 11.1 FL (ref 6–12)
POTASSIUM SERPL-SCNC: 4.3 MMOL/L (ref 3.5–5.2)
PROT SERPL-MCNC: 6.6 G/DL (ref 6–8.5)
RBC # BLD AUTO: 4.37 10*6/MM3 (ref 3.77–5.28)
SODIUM SERPL-SCNC: 142 MMOL/L (ref 136–145)
WBC NRBC COR # BLD AUTO: 5.62 10*3/MM3 (ref 3.4–10.8)

## 2024-02-14 PROCEDURE — 36415 COLL VENOUS BLD VENIPUNCTURE: CPT

## 2024-02-14 PROCEDURE — 85025 COMPLETE CBC W/AUTO DIFF WBC: CPT | Performed by: NURSE PRACTITIONER

## 2024-02-14 PROCEDURE — 84100 ASSAY OF PHOSPHORUS: CPT | Performed by: NURSE PRACTITIONER

## 2024-02-14 PROCEDURE — 96372 THER/PROPH/DIAG INJ SC/IM: CPT

## 2024-02-14 PROCEDURE — 99214 OFFICE O/P EST MOD 30 MIN: CPT | Performed by: INTERNAL MEDICINE

## 2024-02-14 PROCEDURE — 3077F SYST BP >= 140 MM HG: CPT | Performed by: INTERNAL MEDICINE

## 2024-02-14 PROCEDURE — 83735 ASSAY OF MAGNESIUM: CPT | Performed by: NURSE PRACTITIONER

## 2024-02-14 PROCEDURE — 80053 COMPREHEN METABOLIC PANEL: CPT | Performed by: NURSE PRACTITIONER

## 2024-02-14 PROCEDURE — 1126F AMNT PAIN NOTED NONE PRSNT: CPT | Performed by: INTERNAL MEDICINE

## 2024-02-14 PROCEDURE — 25010000002 DENOSUMAB 60 MG/ML SOLUTION PREFILLED SYRINGE: Performed by: NURSE PRACTITIONER

## 2024-02-14 PROCEDURE — 3078F DIAST BP <80 MM HG: CPT | Performed by: INTERNAL MEDICINE

## 2024-02-14 RX ADMIN — DENOSUMAB 60 MG: 60 INJECTION SUBCUTANEOUS at 10:50

## 2024-02-21 RX ORDER — METOPROLOL SUCCINATE 25 MG/1
25 TABLET, EXTENDED RELEASE ORAL NIGHTLY
Qty: 90 TABLET | Refills: 3 | Status: SHIPPED | OUTPATIENT
Start: 2024-02-21

## 2024-02-21 NOTE — TELEPHONE ENCOUNTER
Caller: Cecile Bennett    Relationship: Self    Best call back number: [phone]    Requested Prescriptions:   Requested Prescriptions     Pending Prescriptions Disp Refills    metoprolol succinate XL (TOPROL-XL) 25 MG 24 hr tablet 90 tablet 3     Sig: Take 1 tablet by mouth Every Night.        Pharmacy where request should be sent: [facility] [address] [place] [address] [phone] [facility] [phone]      Last office visit with prescribing clinician: 8/24/2023   Last telemedicine visit with prescribing clinician: Visit date not found   Next office visit with prescribing clinician: 2/27/2024     Additional details provided by patient: PATIENT STATES THAT SHE ACCIDENTALLY DROPPED MEDICATIONS DOWN THE DRAIN AND NEEDS AN EARLY REFILL    Does the patient have less than a 3 day supply:  [x] Yes  [] No    Would you like a call back once the refill request has been completed: [] Yes [x] No    If the office needs to give you a call back, can they leave a voicemail: [] Yes [x] No    Lela Akbar Rep   02/21/24 08:23 EST

## 2024-02-27 ENCOUNTER — OFFICE VISIT (OUTPATIENT)
Dept: INTERNAL MEDICINE | Facility: CLINIC | Age: 84
End: 2024-02-27
Payer: MEDICARE

## 2024-02-27 VITALS
SYSTOLIC BLOOD PRESSURE: 140 MMHG | HEART RATE: 63 BPM | DIASTOLIC BLOOD PRESSURE: 70 MMHG | WEIGHT: 106 LBS | OXYGEN SATURATION: 98 % | HEIGHT: 60 IN | BODY MASS INDEX: 20.81 KG/M2

## 2024-02-27 DIAGNOSIS — M81.0 OSTEOPOROSIS, UNSPECIFIED OSTEOPOROSIS TYPE, UNSPECIFIED PATHOLOGICAL FRACTURE PRESENCE: ICD-10-CM

## 2024-02-27 DIAGNOSIS — R42 DIZZINESS: ICD-10-CM

## 2024-02-27 DIAGNOSIS — K27.9 PUD (PEPTIC ULCER DISEASE): ICD-10-CM

## 2024-02-27 DIAGNOSIS — I10 ESSENTIAL HYPERTENSION: Primary | ICD-10-CM

## 2024-02-27 PROCEDURE — 1159F MED LIST DOCD IN RCRD: CPT | Performed by: INTERNAL MEDICINE

## 2024-02-27 PROCEDURE — 1160F RVW MEDS BY RX/DR IN RCRD: CPT | Performed by: INTERNAL MEDICINE

## 2024-02-27 PROCEDURE — 99214 OFFICE O/P EST MOD 30 MIN: CPT | Performed by: INTERNAL MEDICINE

## 2024-02-27 PROCEDURE — 3078F DIAST BP <80 MM HG: CPT | Performed by: INTERNAL MEDICINE

## 2024-02-27 PROCEDURE — 3077F SYST BP >= 140 MM HG: CPT | Performed by: INTERNAL MEDICINE

## 2024-02-27 RX ORDER — BISOPROLOL FUMARATE AND HYDROCHLOROTHIAZIDE 2.5; 6.25 MG/1; MG/1
1 TABLET ORAL EVERY MORNING
Qty: 30 TABLET | Refills: 5 | Status: SHIPPED | OUTPATIENT
Start: 2024-02-27

## 2024-02-27 NOTE — PROGRESS NOTES
Patient is a 83 y.o. female who is here for a follow up of hypertension.  Chief Complaint   Patient presents with    Hypertension         HPI:    Here for mgmt of HTN and dizziness.  Still gets episodes of vertigo, lasting seconds.  Does not check BP.  No HAs.  Appetite is good.  No falls.  No abdominal pains.     History:     Patient Active Problem List   Diagnosis    Ataxia    Dizziness    Numbness and tingling of both feet    PUD (peptic ulcer disease)    Seasonal allergic rhinitis due to pollen    Osteoporosis    Essential hypertension    Pharyngitis    Leg edema, left    Invasive ductal carcinoma of breast, female, left    Anxiety    Dog bite    Esophageal stricture       Past Medical History:   Diagnosis Date    Aortic valve sclerosis     Breast cancer 2009    RIGHT    History of transfusion 1980    no reaction shadi ky    Hx of radiation therapy 2009    RT BREAST CANCER    Hypertension     Osteoporosis     Skin cancer     facial forehead       Past Surgical History:   Procedure Laterality Date    BREAST BIOPSY Right 04/27/2009    Stereotactic biopsy    BREAST LUMPECTOMY Right 05/26/2009    COLONOSCOPY      ECTOPIC PREGNANCY SURGERY      ENDOSCOPY      ENDOSCOPY  2023    EYE SURGERY      bilateral cataract    MASTECTOMY W/ SENTINEL NODE BIOPSY Bilateral 04/19/2022    Procedure: SIMPLE  MASTECTOMY, PROPHYLACTIC RIGHT SIMPLE MASTECTOMY, LEFT SENTINEL NODE BIOPSY;  Surgeon: Modesto Rodriguez MD;  Location: [facility];  Service: General;  Laterality: Bilateral;    SKIN BIOPSY      SKIN CANCER EXCISION Left 01/2023    Face- Basal Cell    TONSILLECTOMY      TUBAL ABDOMINAL LIGATION         Current Outpatient Medications on File Prior to Visit   Medication Sig    anastrozole (ARIMIDEX) 1 MG tablet Take 1 tablet by mouth Daily.    betamethasone dipropionate 0.05 % cream Apply 1 application topically to the appropriate area as directed 2 (Two) Times a Day As Needed for Irritation or Rash.    Calcium  Carb-Cholecalciferol (CALCIUM 1000 + D PO) Take 1 tablet/day by mouth. Vitamin D 2,000 mg    citalopram (CeleXA) 10 MG tablet Take 1 tablet by mouth Every Morning.    denosumab (Prolia) 60 MG/ML solution prefilled syringe syringe Inject 1 mL under the skin into the appropriate area as directed 1 (One) Time. Every 6 months    omeprazole (priLOSEC) 40 MG capsule Take 1 capsule by mouth 2 (Two) Times a Day.    ondansetron ODT (ZOFRAN-ODT) 4 MG disintegrating tablet Place 1 tablet on the tongue Every 8 (Eight) Hours As Needed for Nausea or Vomiting.    [DISCONTINUED] metoprolol succinate XL (TOPROL-XL) 25 MG 24 hr tablet Take 1 tablet by mouth Every Night.    [DISCONTINUED] doxycycline (VIBRAMYCIN) 100 MG capsule Take 1 capsule by mouth 2 (Two) Times a Day. (Patient not taking: Reported on 2024)     No current facility-administered medications on file prior to visit.       Family History   Problem Relation Age of Onset    No Known Problems Mother     Lung cancer Father     Breast cancer Sister 67    Dementia Brother     Pancreatic cancer Brother     Stroke Maternal Grandmother     Breast cancer Cousin 40    Ovarian cancer Neg Hx        Social History     Socioeconomic History    Marital status:    Tobacco Use    Smoking status: Former     Packs/day: 1.00     Years: 15.00     Additional pack years: 0.00     Total pack years: 15.00     Types: Cigarettes     Quit date:      Years since quittin.1     Passive exposure: Never    Smokeless tobacco: Never   Vaping Use    Vaping Use: Never used   Substance and Sexual Activity    Alcohol use: Yes     Alcohol/week: 1.0 standard drink of alcohol     Types: 1 Glasses of wine per week    Drug use: Never    Sexual activity: Not Currently         Review of Systems   Constitutional:  Negative for chills, fever and unexpected weight change.   HENT:  Negative for congestion, ear pain, hearing loss, rhinorrhea, sinus pressure and trouble swallowing.    Eyes:  Negative  "for discharge and itching.   Respiratory:  Negative for chest tightness and shortness of breath.    Cardiovascular:  Positive for leg swelling (on left). Negative for chest pain and palpitations.   Gastrointestinal:  Negative for abdominal pain, blood in stool, constipation, diarrhea and vomiting.   Endocrine: Negative for polydipsia and polyuria.   Genitourinary:  Negative for difficulty urinating, dysuria, enuresis, frequency, hematuria and urgency.        3/22 mammogram   Musculoskeletal:  Negative for arthralgias, back pain, gait problem and joint swelling.        Left bunion   Skin:  Negative for rash.   Allergic/Immunologic: Negative for immunocompromised state.   Neurological:  Positive for dizziness and light-headedness. Negative for syncope, weakness, numbness and headaches.   Hematological:  Does not bruise/bleed easily.   Psychiatric/Behavioral:  Negative for behavioral problems, dysphoric mood and sleep disturbance. The patient is nervous/anxious (better).        /70   Pulse 63   Ht 152.4 cm (60\")   Wt 48.1 kg (106 lb)   SpO2 98%   BMI 20.70 kg/m²       Physical Exam  Constitutional:       Appearance: Normal appearance. She is well-developed.   HENT:      Head: Normocephalic and atraumatic.      Right Ear: External ear normal.      Left Ear: External ear normal.      Nose: Nose normal.      Mouth/Throat:      Mouth: Mucous membranes are moist.      Pharynx: Oropharynx is clear.   Eyes:      Extraocular Movements: Extraocular movements intact.      Conjunctiva/sclera: Conjunctivae normal.      Pupils: Pupils are equal, round, and reactive to light.   Cardiovascular:      Rate and Rhythm: Normal rate and regular rhythm.      Heart sounds: Normal heart sounds.   Pulmonary:      Effort: Pulmonary effort is normal.      Breath sounds: Normal breath sounds.   Abdominal:      General: Bowel sounds are normal.      Palpations: Abdomen is soft.   Musculoskeletal:         General: Deformity (kyphosis) " present. Normal range of motion.      Cervical back: Normal range of motion and neck supple.      Left lower leg: Edema present.   Lymphadenopathy:      Cervical: No cervical adenopathy.   Skin:     General: Skin is warm and dry.   Neurological:      General: No focal deficit present.      Mental Status: She is alert and oriented to person, place, and time.   Psychiatric:         Mood and Affect: Mood normal.         Behavior: Behavior normal.         Thought Content: Thought content normal.         Procedure:      Discussion/Summary:    HTN-cont BB but change to ziac, advised goal of 130/80  PUD-controlled on PPI   Rhinitis-cont claritin  Osteoporosis-Advised weight bearing exercise and at least vit D 2000 IU qd, on Prolia  Anemia-recheck at goal  Abnormal glucose-counseled on diet  Breast cancer-cont Arimidex     prior labs noted and dw patient    Current Outpatient Medications:     anastrozole (ARIMIDEX) 1 MG tablet, Take 1 tablet by mouth Daily., Disp: 90 tablet, Rfl: 3    betamethasone dipropionate 0.05 % cream, Apply 1 application topically to the appropriate area as directed 2 (Two) Times a Day As Needed for Irritation or Rash., Disp: 45 g, Rfl: 1    Calcium Carb-Cholecalciferol (CALCIUM 1000 + D PO), Take 1 tablet/day by mouth. Vitamin D 2,000 mg, Disp: , Rfl:     citalopram (CeleXA) 10 MG tablet, Take 1 tablet by mouth Every Morning., Disp: 90 tablet, Rfl: 3    denosumab (Prolia) 60 MG/ML solution prefilled syringe syringe, Inject 1 mL under the skin into the appropriate area as directed 1 (One) Time. Every 6 months, Disp: , Rfl:     omeprazole (priLOSEC) 40 MG capsule, Take 1 capsule by mouth 2 (Two) Times a Day., Disp: 180 capsule, Rfl: 0    ondansetron ODT (ZOFRAN-ODT) 4 MG disintegrating tablet, Place 1 tablet on the tongue Every 8 (Eight) Hours As Needed for Nausea or Vomiting., Disp: 10 tablet, Rfl: 0    bisoprolol-hydrochlorothiazide (Ziac) 2.5-6.25 MG per tablet, Take 1 tablet by mouth Every  Morning., Disp: 30 tablet, Rfl: 5        Diagnoses and all orders for this visit:    1. Essential hypertension (Primary)  -     bisoprolol-hydrochlorothiazide (Ziac) 2.5-6.25 MG per tablet; Take 1 tablet by mouth Every Morning.  Dispense: 30 tablet; Refill: 5    2. PUD (peptic ulcer disease)    3. Osteoporosis, unspecified osteoporosis type, unspecified pathological fracture presence    4. Dizziness

## 2024-04-02 RX ORDER — OMEPRAZOLE 40 MG/1
40 CAPSULE, DELAYED RELEASE ORAL 2 TIMES DAILY
Qty: 180 CAPSULE | Refills: 0 | Status: SHIPPED | OUTPATIENT
Start: 2024-04-02 | End: 2024-04-03 | Stop reason: SDUPTHER

## 2024-04-03 RX ORDER — OMEPRAZOLE 40 MG/1
40 CAPSULE, DELAYED RELEASE ORAL 2 TIMES DAILY
Qty: 180 CAPSULE | Refills: 3 | Status: SHIPPED | OUTPATIENT
Start: 2024-04-03

## 2024-04-12 ENCOUNTER — OFFICE VISIT (OUTPATIENT)
Dept: INTERNAL MEDICINE | Facility: CLINIC | Age: 84
End: 2024-04-12
Payer: MEDICARE

## 2024-04-12 VITALS
DIASTOLIC BLOOD PRESSURE: 70 MMHG | HEART RATE: 51 BPM | SYSTOLIC BLOOD PRESSURE: 126 MMHG | HEIGHT: 60 IN | OXYGEN SATURATION: 98 % | WEIGHT: 107 LBS | BODY MASS INDEX: 21.01 KG/M2

## 2024-04-12 DIAGNOSIS — C50.912 INVASIVE DUCTAL CARCINOMA OF BREAST, FEMALE, LEFT: ICD-10-CM

## 2024-04-12 DIAGNOSIS — J30.1 SEASONAL ALLERGIC RHINITIS DUE TO POLLEN: ICD-10-CM

## 2024-04-12 DIAGNOSIS — I10 ESSENTIAL HYPERTENSION: Primary | ICD-10-CM

## 2024-04-12 DIAGNOSIS — M81.0 OSTEOPOROSIS, UNSPECIFIED OSTEOPOROSIS TYPE, UNSPECIFIED PATHOLOGICAL FRACTURE PRESENCE: ICD-10-CM

## 2024-04-12 DIAGNOSIS — K27.9 PUD (PEPTIC ULCER DISEASE): ICD-10-CM

## 2024-04-12 PROCEDURE — 3078F DIAST BP <80 MM HG: CPT | Performed by: INTERNAL MEDICINE

## 2024-04-12 PROCEDURE — 1160F RVW MEDS BY RX/DR IN RCRD: CPT | Performed by: INTERNAL MEDICINE

## 2024-04-12 PROCEDURE — 1159F MED LIST DOCD IN RCRD: CPT | Performed by: INTERNAL MEDICINE

## 2024-04-12 PROCEDURE — 99214 OFFICE O/P EST MOD 30 MIN: CPT | Performed by: INTERNAL MEDICINE

## 2024-04-12 PROCEDURE — 3074F SYST BP LT 130 MM HG: CPT | Performed by: INTERNAL MEDICINE

## 2024-04-12 NOTE — PROGRESS NOTES
Patient is a 84 y.o. female who is here for a follow up of hypertension.  Chief Complaint   Patient presents with    Hypertension         HPI:    Here for mgmt of HTN and dizziness.  Vertigo better.  No falls.  No HAs.  Appetite is good.  Her edema is better with compression hose.  Weight is stable.  Sleeping well.  No abdominal pains    History:     Patient Active Problem List   Diagnosis    Ataxia    Dizziness    Numbness and tingling of both feet    PUD (peptic ulcer disease)    Seasonal allergic rhinitis due to pollen    Osteoporosis    Essential hypertension    Pharyngitis    Leg edema, left    Invasive ductal carcinoma of breast, female, left    Anxiety    Dog bite    Esophageal stricture       Past Medical History:   Diagnosis Date    Aortic valve sclerosis     Breast cancer 2009    RIGHT    History of transfusion 1980    no reaction shadi ky    Hx of radiation therapy 2009    RT BREAST CANCER    Hypertension     Osteoporosis     Skin cancer     facial forehead       Past Surgical History:   Procedure Laterality Date    BREAST BIOPSY Right 04/27/2009    Stereotactic biopsy    BREAST LUMPECTOMY Right 05/26/2009    COLONOSCOPY      ECTOPIC PREGNANCY SURGERY      ENDOSCOPY      ENDOSCOPY  2023    EYE SURGERY      bilateral cataract    MASTECTOMY W/ SENTINEL NODE BIOPSY Bilateral 04/19/2022    Procedure: SIMPLE  MASTECTOMY, PROPHYLACTIC RIGHT SIMPLE MASTECTOMY, LEFT SENTINEL NODE BIOPSY;  Surgeon: Modesto Rodriguez MD;  Location: [facility];  Service: General;  Laterality: Bilateral;    SKIN BIOPSY      SKIN CANCER EXCISION Left 01/2023    Face- Basal Cell    TONSILLECTOMY      TUBAL ABDOMINAL LIGATION         Current Outpatient Medications on File Prior to Visit   Medication Sig    anastrozole (ARIMIDEX) 1 MG tablet Take 1 tablet by mouth Daily.    betamethasone dipropionate 0.05 % cream Apply 1 application topically to the appropriate area as directed 2 (Two) Times a Day As Needed for Irritation or Rash.     bisoprolol-hydrochlorothiazide (Ziac) 2.5-6.25 MG per tablet Take 1 tablet by mouth Every Morning.    Calcium Carb-Cholecalciferol (CALCIUM 1000 + D PO) Take 1 tablet/day by mouth. Vitamin D 2,000 mg    citalopram (CeleXA) 10 MG tablet Take 1 tablet by mouth Every Morning.    denosumab (Prolia) 60 MG/ML solution prefilled syringe syringe Inject 1 mL under the skin into the appropriate area as directed 1 (One) Time. Every 6 months    omeprazole (priLOSEC) 40 MG capsule Take 1 capsule by mouth 2 (Two) Times a Day.    ondansetron ODT (ZOFRAN-ODT) 4 MG disintegrating tablet Place 1 tablet on the tongue Every 8 (Eight) Hours As Needed for Nausea or Vomiting.     No current facility-administered medications on file prior to visit.       Family History   Problem Relation Age of Onset    No Known Problems Mother     Lung cancer Father     Breast cancer Sister 67    Dementia Brother     Pancreatic cancer Brother     Stroke Maternal Grandmother     Breast cancer Cousin 40    Ovarian cancer Neg Hx        Social History     Socioeconomic History    Marital status:    Tobacco Use    Smoking status: Former     Current packs/day: 0.00     Average packs/day: 1 pack/day for 15.0 years (15.0 ttl pk-yrs)     Types: Cigarettes     Start date:      Quit date:      Years since quittin.3     Passive exposure: Never    Smokeless tobacco: Never   Vaping Use    Vaping status: Never Used   Substance and Sexual Activity    Alcohol use: Yes     Alcohol/week: 1.0 standard drink of alcohol     Types: 1 Glasses of wine per week    Drug use: Never    Sexual activity: Not Currently         Review of Systems   Constitutional:  Negative for chills, fever and unexpected weight change.   HENT:  Negative for congestion, ear pain, hearing loss, rhinorrhea, sinus pressure and trouble swallowing.    Eyes:  Negative for discharge and itching.   Respiratory:  Negative for chest tightness and shortness of breath.    Cardiovascular:   "Positive for leg swelling (on left). Negative for chest pain and palpitations.   Gastrointestinal:  Negative for abdominal pain, blood in stool, constipation, diarrhea and vomiting.   Endocrine: Negative for polydipsia and polyuria.   Genitourinary:  Negative for difficulty urinating, dysuria, enuresis, frequency, hematuria and urgency.        3/22 mammogram   Musculoskeletal:  Negative for arthralgias, back pain, gait problem and joint swelling.        Left bunion   Skin:  Negative for rash.   Allergic/Immunologic: Negative for immunocompromised state.   Neurological:  Positive for dizziness and light-headedness. Negative for syncope, weakness, numbness and headaches.   Hematological:  Does not bruise/bleed easily.   Psychiatric/Behavioral:  Negative for behavioral problems, dysphoric mood and sleep disturbance. The patient is nervous/anxious (better).        /70   Pulse 51   Ht 152.4 cm (60\")   Wt 48.5 kg (107 lb)   SpO2 98%   BMI 20.90 kg/m²       Physical Exam  Constitutional:       Appearance: Normal appearance. She is well-developed.   HENT:      Head: Normocephalic and atraumatic.      Right Ear: External ear normal.      Left Ear: External ear normal.      Nose: Nose normal.      Mouth/Throat:      Mouth: Mucous membranes are moist.      Pharynx: Oropharynx is clear.   Eyes:      Extraocular Movements: Extraocular movements intact.      Conjunctiva/sclera: Conjunctivae normal.      Pupils: Pupils are equal, round, and reactive to light.   Cardiovascular:      Rate and Rhythm: Normal rate and regular rhythm.      Heart sounds: Normal heart sounds.   Pulmonary:      Effort: Pulmonary effort is normal.      Breath sounds: Normal breath sounds.   Abdominal:      General: Bowel sounds are normal.      Palpations: Abdomen is soft.   Musculoskeletal:         General: Deformity (kyphosis) present. Normal range of motion.      Cervical back: Normal range of motion and neck supple.      Left lower leg: " Edema present.   Lymphadenopathy:      Cervical: No cervical adenopathy.   Skin:     General: Skin is warm and dry.   Neurological:      General: No focal deficit present.      Mental Status: She is alert and oriented to person, place, and time.   Psychiatric:         Mood and Affect: Mood normal.         Behavior: Behavior normal.         Thought Content: Thought content normal.         Procedure:      Discussion/Summary:    HTN-cont ziac, advised goal of 130/80, advised to notify if gets dizzy sec to low HR  PUD-controlled on PPI   Rhinitis-cont claritin  Osteoporosis-Advised weight bearing exercise and at least vit D 2000 IU qd, on Prolia  Anemia-recheck at goal  Abnormal glucose-counseled on diet  Breast cancer-cont Arimidex     prior labs noted and dw patient    Current Outpatient Medications:     anastrozole (ARIMIDEX) 1 MG tablet, Take 1 tablet by mouth Daily., Disp: 90 tablet, Rfl: 3    betamethasone dipropionate 0.05 % cream, Apply 1 application topically to the appropriate area as directed 2 (Two) Times a Day As Needed for Irritation or Rash., Disp: 45 g, Rfl: 1    bisoprolol-hydrochlorothiazide (Ziac) 2.5-6.25 MG per tablet, Take 1 tablet by mouth Every Morning., Disp: 30 tablet, Rfl: 5    Calcium Carb-Cholecalciferol (CALCIUM 1000 + D PO), Take 1 tablet/day by mouth. Vitamin D 2,000 mg, Disp: , Rfl:     citalopram (CeleXA) 10 MG tablet, Take 1 tablet by mouth Every Morning., Disp: 90 tablet, Rfl: 3    denosumab (Prolia) 60 MG/ML solution prefilled syringe syringe, Inject 1 mL under the skin into the appropriate area as directed 1 (One) Time. Every 6 months, Disp: , Rfl:     omeprazole (priLOSEC) 40 MG capsule, Take 1 capsule by mouth 2 (Two) Times a Day., Disp: 180 capsule, Rfl: 3    ondansetron ODT (ZOFRAN-ODT) 4 MG disintegrating tablet, Place 1 tablet on the tongue Every 8 (Eight) Hours As Needed for Nausea or Vomiting., Disp: 10 tablet, Rfl: 0        Diagnoses and all orders for this visit:    1.  Essential hypertension (Primary)    2. Invasive ductal carcinoma of breast, female, left    3. Osteoporosis, unspecified osteoporosis type, unspecified pathological fracture presence    4. Seasonal allergic rhinitis due to pollen    5. PUD (peptic ulcer disease)

## 2024-07-16 ENCOUNTER — HOSPITAL ENCOUNTER (OUTPATIENT)
Dept: BONE DENSITY | Facility: HOSPITAL | Age: 84
Discharge: HOME | End: 2024-07-16
Admitting: INTERNAL MEDICINE
Payer: MEDICARE

## 2024-07-16 DIAGNOSIS — M81.0 AGE-RELATED OSTEOPOROSIS WITHOUT CURRENT PATHOLOGICAL FRACTURE: ICD-10-CM

## 2024-07-16 PROCEDURE — 77080 DXA BONE DENSITY AXIAL: CPT

## 2024-08-07 ENCOUNTER — TELEPHONE (OUTPATIENT)
Dept: INTERNAL MEDICINE | Facility: CLINIC | Age: 84
End: 2024-08-07
Payer: MEDICARE

## 2024-08-07 DIAGNOSIS — F41.9 ANXIETY: ICD-10-CM

## 2024-08-07 RX ORDER — CITALOPRAM HYDROBROMIDE 10 MG/1
10 TABLET ORAL EVERY MORNING
Qty: 90 TABLET | Refills: 3 | Status: SHIPPED | OUTPATIENT
Start: 2024-08-07

## 2024-08-07 NOTE — TELEPHONE ENCOUNTER
Caller: Bennett Cecileria Dunn    Relationship: Self    Best call back number: [phone]    Requested Prescriptions:   Requested Prescriptions      No prescriptions requested or ordered in this encounter      citalopram (CeleXA) 10 MG tablet     Pharmacy where request should be sent: [facility] [address] [place] [address] [phone] [facility] [phone]      Last office visit with prescribing clinician: 4/12/2024   Last telemedicine visit with prescribing clinician: Visit date not found   Next office visit with prescribing clinician: 8/20/2024     Additional details provided by patient:     PLEASE CALL IN 90 DAY SUPPLY    Does the patient have less than a 3 day supply:  [x] Yes  [] No    Would you like a call back once the refill request has been completed: [] Yes [x] No    If the office needs to give you a call back, can they leave a voicemail: [] Yes [x] No    Kimberly Valdez, PCT   08/07/24 11:30 EDT

## 2024-08-14 ENCOUNTER — OFFICE VISIT (OUTPATIENT)
Dept: ONCOLOGY | Facility: CLINIC | Age: 84
End: 2024-08-14
Payer: MEDICARE

## 2024-08-14 ENCOUNTER — TELEPHONE (OUTPATIENT)
Dept: ONCOLOGY | Facility: CLINIC | Age: 84
End: 2024-08-14

## 2024-08-14 VITALS
TEMPERATURE: 97.7 F | WEIGHT: 107 LBS | SYSTOLIC BLOOD PRESSURE: 105 MMHG | BODY MASS INDEX: 21.01 KG/M2 | HEIGHT: 60 IN | HEART RATE: 53 BPM | RESPIRATION RATE: 16 BRPM | DIASTOLIC BLOOD PRESSURE: 74 MMHG | OXYGEN SATURATION: 96 %

## 2024-08-14 DIAGNOSIS — C50.912 INVASIVE DUCTAL CARCINOMA OF BREAST, FEMALE, LEFT: Primary | ICD-10-CM

## 2024-08-14 PROCEDURE — 3074F SYST BP LT 130 MM HG: CPT | Performed by: NURSE PRACTITIONER

## 2024-08-14 PROCEDURE — 1159F MED LIST DOCD IN RCRD: CPT | Performed by: NURSE PRACTITIONER

## 2024-08-14 PROCEDURE — 1126F AMNT PAIN NOTED NONE PRSNT: CPT | Performed by: NURSE PRACTITIONER

## 2024-08-14 PROCEDURE — 99213 OFFICE O/P EST LOW 20 MIN: CPT | Performed by: NURSE PRACTITIONER

## 2024-08-14 PROCEDURE — 1160F RVW MEDS BY RX/DR IN RCRD: CPT | Performed by: NURSE PRACTITIONER

## 2024-08-14 PROCEDURE — 3078F DIAST BP <80 MM HG: CPT | Performed by: NURSE PRACTITIONER

## 2024-08-14 RX ORDER — METOPROLOL SUCCINATE 25 MG/1
25 TABLET, EXTENDED RELEASE ORAL NIGHTLY
COMMUNITY

## 2024-08-14 NOTE — PROGRESS NOTES
"        PROBLEM LIST:  1. [id number] ER+ (95%) NC+ (95%) Her2 negative (1+) invasive ductal carcinoma of the left breast  A) bilateral mastectomy on 4/19/22.  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  B) anastrozole started May 2022  2. History of right breast cancer  3. Hypertension  4. osteoporosis    Subjective     CHIEF COMPLAINT: breast cancer    HISTORY OF PRESENT ILLNESS:   Cecile Bennett returns for follow-up.  She continues on anastrozole.  She is tolerating it well.  She denies any hot flashes or joint pain.    Her daughter tested positive for COVID on 8/11/2024.  Patient has had exposure to her daughter approximately 7 days ago.  Patient reports nasal drainage but states that is not unusual for her.  She also has a new tickle in her throat today.  She denies any shortness of air or fevers.  No headaches.            Objective      /74   Pulse 53   Temp 97.7 °F (36.5 °C)   Resp 16   Ht 152.4 cm (60\")   Wt 48.5 kg (107 lb)   SpO2 96%   BMI 20.90 kg/m²    Vitals:    08/14/24 1039   PainSc: 0-No pain                   ECOG score: 2             General: well appearing female in no acute distress  Neuro: alert and oriented  HEENT: sclera anicteric, oropharynx clear  Lymphatics: no cervical, supraclavicular, or axillary adenopathy  Cardiovascular: regular rate and rhythm, no murmurs  Lungs: clear to auscultation bilaterally  Breast: Bilateral mastectomy incisions well-healed with no masses or skin changes  Abdomen: soft, nontender, nondistended.  No palpable organomegaly  Extremeties: no lower extremity edema  Skin: no rashes, lesions, bruising, or petechiae  Psych: mood and affect appropriate    I have reexamined the patient and the results are consistent with the previously documented exam. [facility]        RECENT LABS:  Lab Results   Component Value Date    WBC 5.62 02/14/2024    HGB 13.2 02/14/2024    HCT 40.6 02/14/2024    MCV 92.9 02/14/2024    PLT " 142 02/14/2024       Lab Results   Component Value Date    GLUCOSE 69 02/14/2024    BUN 11 02/14/2024    CREATININE 0.83 02/14/2024    EGFRIFNONA 62 07/14/2021    BCR 13.3 02/14/2024    K 4.3 02/14/2024    CO2 27.0 02/14/2024    CALCIUM 8.9 02/14/2024    ALBUMIN 3.8 02/14/2024    AST 28 02/14/2024    ALT 14 02/14/2024                  ASSESSMENT AND PLAN:     Cecile Bennett is a 84 y.o. female  with a T2N0M0 ER+ Her2 negative IDC of the left breast.     She began anastrozole early May 2022 and is tolerating it well.  We will continue this for a minimum of 5 years.      Osteoporosis: Bone density from 7/16/2024 shows osteoporosis.  The lumbar spine T-score is slightly worse at -3.0.  The remaining of the bone density scan is relatively stable.  We will plan on continuing Prolia and we will repeat bone density scan in 2 years.    Due to patient's recent exposure to COVID and mild possible COVID symptoms I have asked her to test for COVID today.  We will hold her Prolia injection until we hear back from her with her COVID test results.  If her COVID test is negative we will arrange for her Prolia and labs as soon as possible.      Follow-up 6 months.                      Tata Martínez APRN  [facility]    8/14/2024          CC:

## 2024-08-14 NOTE — TELEPHONE ENCOUNTER
Patient tested negative for COVID today. We have her rescheduled for 8/15/2024 at 11:00 am for Prolia. Patient states she can make this appointment.

## 2024-08-14 NOTE — TELEPHONE ENCOUNTER
Caller: Cecile Bennett    Relationship: Self    Best call back number: [phone]    What is the best time to reach you: ANYTIME    Who are you requesting to speak with (clinical staff, provider,  specific staff member): CLINICAL    What was the call regarding: PT CALLING TO INFORM TOMMY ESQUIVEL THAT SHE TESTED NEGATIVE FOR COVID AND WOULD LIKE TO RESCHEDULE HER PROLIA INJECTION FROM TODAY

## 2024-08-15 ENCOUNTER — HOSPITAL ENCOUNTER (OUTPATIENT)
Dept: ONCOLOGY | Facility: HOSPITAL | Age: 84
Discharge: HOME | End: 2024-08-15
Payer: MEDICARE

## 2024-08-15 VITALS
DIASTOLIC BLOOD PRESSURE: 63 MMHG | TEMPERATURE: 98.3 F | SYSTOLIC BLOOD PRESSURE: 140 MMHG | WEIGHT: 108 LBS | HEART RATE: 51 BPM | RESPIRATION RATE: 16 BRPM | BODY MASS INDEX: 21.2 KG/M2 | HEIGHT: 60 IN

## 2024-08-15 DIAGNOSIS — M81.0 OSTEOPOROSIS, UNSPECIFIED OSTEOPOROSIS TYPE, UNSPECIFIED PATHOLOGICAL FRACTURE PRESENCE: Primary | ICD-10-CM

## 2024-08-15 LAB
ALBUMIN SERPL-MCNC: 4 G/DL (ref 3.5–5.2)
ALBUMIN/GLOB SERPL: 1.7 G/DL
ALP SERPL-CCNC: 71 U/L (ref 39–117)
ALT SERPL W P-5'-P-CCNC: 14 U/L (ref 1–33)
ANION GAP SERPL CALCULATED.3IONS-SCNC: 9 MMOL/L (ref 5–15)
AST SERPL-CCNC: 31 U/L (ref 1–32)
BASOPHILS # BLD AUTO: 0.03 10*3/MM3 (ref 0–0.2)
BASOPHILS NFR BLD AUTO: 0.4 % (ref 0–1.5)
BILIRUB SERPL-MCNC: 0.4 MG/DL (ref 0–1.2)
BUN SERPL-MCNC: 20 MG/DL (ref 8–23)
BUN/CREAT SERPL: 24.1 (ref 7–25)
CALCIUM SPEC-SCNC: 9.1 MG/DL (ref 8.6–10.5)
CHLORIDE SERPL-SCNC: 103 MMOL/L (ref 98–107)
CO2 SERPL-SCNC: 28 MMOL/L (ref 22–29)
CREAT SERPL-MCNC: 0.83 MG/DL (ref 0.57–1)
DEPRECATED RDW RBC AUTO: 45.5 FL (ref 37–54)
EGFRCR SERPLBLD CKD-EPI 2021: 70.5 ML/MIN/1.73
EOSINOPHIL # BLD AUTO: 0.18 10*3/MM3 (ref 0–0.4)
EOSINOPHIL NFR BLD AUTO: 2.5 % (ref 0.3–6.2)
ERYTHROCYTE [DISTWIDTH] IN BLOOD BY AUTOMATED COUNT: 13 % (ref 12.3–15.4)
GLOBULIN UR ELPH-MCNC: 2.4 GM/DL
GLUCOSE SERPL-MCNC: 59 MG/DL (ref 65–99)
HCT VFR BLD AUTO: 39.3 % (ref 34–46.6)
HGB BLD-MCNC: 12.7 G/DL (ref 12–15.9)
IMM GRANULOCYTES # BLD AUTO: 0.01 10*3/MM3 (ref 0–0.05)
IMM GRANULOCYTES NFR BLD AUTO: 0.1 % (ref 0–0.5)
LYMPHOCYTES # BLD AUTO: 1.65 10*3/MM3 (ref 0.7–3.1)
LYMPHOCYTES NFR BLD AUTO: 23 % (ref 19.6–45.3)
MAGNESIUM SERPL-MCNC: 2.1 MG/DL (ref 1.6–2.4)
MCH RBC QN AUTO: 30.6 PG (ref 26.6–33)
MCHC RBC AUTO-ENTMCNC: 32.3 G/DL (ref 31.5–35.7)
MCV RBC AUTO: 94.7 FL (ref 79–97)
MONOCYTES # BLD AUTO: 0.74 10*3/MM3 (ref 0.1–0.9)
MONOCYTES NFR BLD AUTO: 10.3 % (ref 5–12)
NEUTROPHILS NFR BLD AUTO: 4.57 10*3/MM3 (ref 1.7–7)
NEUTROPHILS NFR BLD AUTO: 63.7 % (ref 42.7–76)
PHOSPHATE SERPL-MCNC: 3 MG/DL (ref 2.5–4.5)
PLATELET # BLD AUTO: 174 10*3/MM3 (ref 140–450)
PMV BLD AUTO: 10.5 FL (ref 6–12)
POTASSIUM SERPL-SCNC: 4.1 MMOL/L (ref 3.5–5.2)
PROT SERPL-MCNC: 6.4 G/DL (ref 6–8.5)
RBC # BLD AUTO: 4.15 10*6/MM3 (ref 3.77–5.28)
SODIUM SERPL-SCNC: 140 MMOL/L (ref 136–145)
WBC NRBC COR # BLD AUTO: 7.18 10*3/MM3 (ref 3.4–10.8)

## 2024-08-15 PROCEDURE — 80053 COMPREHEN METABOLIC PANEL: CPT | Performed by: INTERNAL MEDICINE

## 2024-08-15 PROCEDURE — 25010000002 DENOSUMAB 60 MG/ML SOLUTION PREFILLED SYRINGE: Performed by: INTERNAL MEDICINE

## 2024-08-15 PROCEDURE — 96372 THER/PROPH/DIAG INJ SC/IM: CPT

## 2024-08-15 PROCEDURE — 84100 ASSAY OF PHOSPHORUS: CPT | Performed by: INTERNAL MEDICINE

## 2024-08-15 PROCEDURE — 83735 ASSAY OF MAGNESIUM: CPT | Performed by: INTERNAL MEDICINE

## 2024-08-15 PROCEDURE — 85025 COMPLETE CBC W/AUTO DIFF WBC: CPT | Performed by: INTERNAL MEDICINE

## 2024-08-15 RX ADMIN — DENOSUMAB 60 MG: 60 INJECTION SUBCUTANEOUS at 11:33

## 2024-08-20 ENCOUNTER — OFFICE VISIT (OUTPATIENT)
Dept: INTERNAL MEDICINE | Facility: CLINIC | Age: 84
End: 2024-08-20
Payer: MEDICARE

## 2024-08-20 VITALS
SYSTOLIC BLOOD PRESSURE: 124 MMHG | WEIGHT: 107 LBS | BODY MASS INDEX: 21.01 KG/M2 | HEART RATE: 54 BPM | HEIGHT: 60 IN | DIASTOLIC BLOOD PRESSURE: 70 MMHG | OXYGEN SATURATION: 99 %

## 2024-08-20 DIAGNOSIS — K27.9 PUD (PEPTIC ULCER DISEASE): ICD-10-CM

## 2024-08-20 DIAGNOSIS — M81.0 OSTEOPOROSIS, UNSPECIFIED OSTEOPOROSIS TYPE, UNSPECIFIED PATHOLOGICAL FRACTURE PRESENCE: ICD-10-CM

## 2024-08-20 DIAGNOSIS — I10 ESSENTIAL HYPERTENSION: Primary | ICD-10-CM

## 2024-08-20 DIAGNOSIS — C50.912 INVASIVE DUCTAL CARCINOMA OF BREAST, FEMALE, LEFT: ICD-10-CM

## 2024-08-20 DIAGNOSIS — J30.1 SEASONAL ALLERGIC RHINITIS DUE TO POLLEN: ICD-10-CM

## 2024-08-20 PROCEDURE — 1159F MED LIST DOCD IN RCRD: CPT | Performed by: INTERNAL MEDICINE

## 2024-08-20 PROCEDURE — 3074F SYST BP LT 130 MM HG: CPT | Performed by: INTERNAL MEDICINE

## 2024-08-20 PROCEDURE — 1126F AMNT PAIN NOTED NONE PRSNT: CPT | Performed by: INTERNAL MEDICINE

## 2024-08-20 PROCEDURE — 99214 OFFICE O/P EST MOD 30 MIN: CPT | Performed by: INTERNAL MEDICINE

## 2024-08-20 PROCEDURE — 3078F DIAST BP <80 MM HG: CPT | Performed by: INTERNAL MEDICINE

## 2024-08-20 PROCEDURE — 1160F RVW MEDS BY RX/DR IN RCRD: CPT | Performed by: INTERNAL MEDICINE

## 2024-08-20 NOTE — PROGRESS NOTES
Patient is a 84 y.o. female who is here for a follow up of hypertension.  Chief Complaint   Patient presents with    Hypertension         HPI:    Here for mgmt of HTN and PUD.  Appetite is good.  No abdominal pains.  No nausea or emesis.  No HAs.  No falls.  Allergies are not bothersome.  Sleep is good.  Occasional dizziness.    History:     Patient Active Problem List   Diagnosis    Ataxia    Dizziness    Numbness and tingling of both feet    PUD (peptic ulcer disease)    Seasonal allergic rhinitis due to pollen    Osteoporosis    Essential hypertension    Pharyngitis    Leg edema, left    Invasive ductal carcinoma of breast, female, left    Anxiety    Dog bite    Esophageal stricture       Past Medical History:   Diagnosis Date    Aortic valve sclerosis     Breast cancer 2009    RIGHT    History of transfusion 1980    no reaction shadi ky    Hx of radiation therapy 2009    RT BREAST CANCER    Hypertension     Osteoporosis     Skin cancer     facial forehead       Past Surgical History:   Procedure Laterality Date    BREAST BIOPSY Right 04/27/2009    Stereotactic biopsy    BREAST LUMPECTOMY Right 05/26/2009    COLONOSCOPY      ECTOPIC PREGNANCY SURGERY      ENDOSCOPY      ENDOSCOPY  2023    EYE SURGERY      bilateral cataract    MASTECTOMY W/ SENTINEL NODE BIOPSY Bilateral 04/19/2022    Procedure: SIMPLE  MASTECTOMY, PROPHYLACTIC RIGHT SIMPLE MASTECTOMY, LEFT SENTINEL NODE BIOPSY;  Surgeon: Modesto Rodriguez MD;  Location: [facility];  Service: General;  Laterality: Bilateral;    SKIN BIOPSY      SKIN CANCER EXCISION Left 01/2023    Face- Basal Cell    TONSILLECTOMY      TUBAL ABDOMINAL LIGATION         Current Outpatient Medications on File Prior to Visit   Medication Sig    anastrozole (ARIMIDEX) 1 MG tablet Take 1 tablet by mouth Daily.    betamethasone dipropionate 0.05 % cream Apply 1 application topically to the appropriate area as directed 2 (Two) Times a Day As Needed for Irritation or Rash.     bisoprolol-hydrochlorothiazide (Ziac) 2.5-6.25 MG per tablet Take 1 tablet by mouth Every Morning.    Calcium Carb-Cholecalciferol (CALCIUM 1000 + D PO) Take 1 tablet/day by mouth. Vitamin D 2,000 mg    citalopram (CeleXA) 10 MG tablet Take 1 tablet by mouth Every Morning.    denosumab (Prolia) 60 MG/ML solution prefilled syringe syringe Inject 1 mL under the skin into the appropriate area as directed 1 (One) Time. Every 6 months    omeprazole (priLOSEC) 40 MG capsule Take 1 capsule by mouth 2 (Two) Times a Day.    ondansetron ODT (ZOFRAN-ODT) 4 MG disintegrating tablet Place 1 tablet on the tongue Every 8 (Eight) Hours As Needed for Nausea or Vomiting.    [DISCONTINUED] metoprolol succinate XL (TOPROL-XL) 25 MG 24 hr tablet Take 1 tablet by mouth Every Night. (Patient not taking: Reported on 2024)     No current facility-administered medications on file prior to visit.       Family History   Problem Relation Age of Onset    No Known Problems Mother     Lung cancer Father     Breast cancer Sister 67    Dementia Brother     Pancreatic cancer Brother     Stroke Maternal Grandmother     Breast cancer Cousin 40    Ovarian cancer Neg Hx        Social History     Socioeconomic History    Marital status:    Tobacco Use    Smoking status: Former     Current packs/day: 0.00     Average packs/day: 1 pack/day for 15.0 years (15.0 ttl pk-yrs)     Types: Cigarettes     Start date:      Quit date:      Years since quittin.6     Passive exposure: Never    Smokeless tobacco: Never   Vaping Use    Vaping status: Never Used   Substance and Sexual Activity    Alcohol use: Yes     Alcohol/week: 1.0 standard drink of alcohol     Types: 1 Glasses of wine per week    Drug use: Never    Sexual activity: Not Currently         Review of Systems   Constitutional:  Negative for chills, fever and unexpected weight change.   HENT:  Negative for congestion, ear pain, hearing loss, rhinorrhea, sinus pressure and trouble  "swallowing.    Eyes:  Negative for discharge and itching.   Respiratory:  Negative for chest tightness and shortness of breath.    Cardiovascular:  Positive for leg swelling (on left). Negative for chest pain and palpitations.   Gastrointestinal:  Negative for abdominal pain, blood in stool, constipation, diarrhea and vomiting.   Endocrine: Negative for polydipsia and polyuria.   Genitourinary:  Negative for difficulty urinating, dysuria, enuresis, frequency, hematuria and urgency.        3/22 mammogram   Musculoskeletal:  Negative for arthralgias, back pain, gait problem and joint swelling.        Left bunion   Skin:  Negative for rash.   Allergic/Immunologic: Negative for immunocompromised state.   Neurological:  Positive for dizziness and light-headedness. Negative for syncope, weakness, numbness and headaches.   Hematological:  Does not bruise/bleed easily.   Psychiatric/Behavioral:  Negative for behavioral problems, dysphoric mood and sleep disturbance. The patient is nervous/anxious (better).        /70 (BP Location: Left arm, Patient Position: Sitting)   Pulse 54   Ht 152.4 cm (60\")   Wt 48.5 kg (107 lb)   SpO2 99%   BMI 20.90 kg/m²       Physical Exam  Constitutional:       Appearance: Normal appearance. She is well-developed.   HENT:      Head: Normocephalic and atraumatic.      Right Ear: External ear normal.      Left Ear: External ear normal.      Nose: Nose normal.      Mouth/Throat:      Mouth: Mucous membranes are moist.      Pharynx: Oropharynx is clear.   Eyes:      Extraocular Movements: Extraocular movements intact.      Conjunctiva/sclera: Conjunctivae normal.      Pupils: Pupils are equal, round, and reactive to light.   Cardiovascular:      Rate and Rhythm: Normal rate and regular rhythm.      Heart sounds: Normal heart sounds.   Pulmonary:      Effort: Pulmonary effort is normal.      Breath sounds: Normal breath sounds.   Abdominal:      General: Bowel sounds are normal.      " Palpations: Abdomen is soft.   Musculoskeletal:         General: Deformity (kyphosis) present. Normal range of motion.      Cervical back: Normal range of motion and neck supple.      Left lower leg: Edema present.   Lymphadenopathy:      Cervical: No cervical adenopathy.   Skin:     General: Skin is warm and dry.   Neurological:      General: No focal deficit present.      Mental Status: She is alert and oriented to person, place, and time.   Psychiatric:         Mood and Affect: Mood normal.         Behavior: Behavior normal.         Thought Content: Thought content normal.         Procedure:      Discussion/Summary:    HTN-cont ziac, advised goal of 130/80, advised to notify if gets dizzy sec to low HR  PUD-controlled on PPI   Rhinitis-cont claritin  Osteoporosis-Advised weight bearing exercise and at least vit D 2000 IU qd, on Prolia  Anemia-recheck noted  Abnormal glucose-counseled on diet  Breast cancer-cont Arimidex     prior labs noted and dw patient    Current Outpatient Medications:     anastrozole (ARIMIDEX) 1 MG tablet, Take 1 tablet by mouth Daily., Disp: 90 tablet, Rfl: 3    betamethasone dipropionate 0.05 % cream, Apply 1 application topically to the appropriate area as directed 2 (Two) Times a Day As Needed for Irritation or Rash., Disp: 45 g, Rfl: 1    bisoprolol-hydrochlorothiazide (Ziac) 2.5-6.25 MG per tablet, Take 1 tablet by mouth Every Morning., Disp: 30 tablet, Rfl: 5    Calcium Carb-Cholecalciferol (CALCIUM 1000 + D PO), Take 1 tablet/day by mouth. Vitamin D 2,000 mg, Disp: , Rfl:     citalopram (CeleXA) 10 MG tablet, Take 1 tablet by mouth Every Morning., Disp: 90 tablet, Rfl: 3    denosumab (Prolia) 60 MG/ML solution prefilled syringe syringe, Inject 1 mL under the skin into the appropriate area as directed 1 (One) Time. Every 6 months, Disp: , Rfl:     omeprazole (priLOSEC) 40 MG capsule, Take 1 capsule by mouth 2 (Two) Times a Day., Disp: 180 capsule, Rfl: 3    ondansetron ODT  (ZOFRAN-ODT) 4 MG disintegrating tablet, Place 1 tablet on the tongue Every 8 (Eight) Hours As Needed for Nausea or Vomiting., Disp: 10 tablet, Rfl: 0        Diagnoses and all orders for this visit:    1. Essential hypertension (Primary)    2. Seasonal allergic rhinitis due to pollen    3. PUD (peptic ulcer disease)    4. Osteoporosis, unspecified osteoporosis type, unspecified pathological fracture presence    5. Invasive ductal carcinoma of breast, female, left

## 2024-08-21 DIAGNOSIS — I10 ESSENTIAL HYPERTENSION: ICD-10-CM

## 2024-08-21 RX ORDER — BISOPROLOL FUMARATE AND HYDROCHLOROTHIAZIDE 2.5; 6.25 MG/1; MG/1
1 TABLET ORAL EVERY MORNING
Qty: 30 TABLET | Refills: 0 | Status: SHIPPED | OUTPATIENT
Start: 2024-08-21

## 2024-08-21 RX ORDER — BISOPROLOL FUMARATE AND HYDROCHLOROTHIAZIDE 2.5; 6.25 MG/1; MG/1
1 TABLET ORAL EVERY MORNING
Qty: 30 TABLET | Refills: 5 | OUTPATIENT
Start: 2024-08-21

## 2024-08-21 NOTE — TELEPHONE ENCOUNTER
Caller: Cecile Bennett    Relationship: Self    Best call back number: [phone]    Requested Prescriptions:   Requested Prescriptions     Pending Prescriptions Disp Refills    bisoprolol-hydrochlorothiazide (Ziac) 2.5-6.25 MG per tablet 30 tablet 5     Sig: Take 1 tablet by mouth Every Morning.        Pharmacy where request should be sent: [facility] [address] [place] [address] [phone] [facility] [phone]      Last office visit with prescribing clinician: 8/20/2024   Last telemedicine visit with prescribing clinician: Visit date not found   Next office visit with prescribing clinician: 9/25/2024     Additional details provided by patient: PT HAS 3 PILLS LEFT    Does the patient have less than a 3 day supply:  [x] Yes  [] No    Would you like a call back once the refill request has been completed: [x] Yes [] No    If the office needs to give you a call back, can they leave a voicemail: [x] Yes [] No    Lela Ferguson Rep   08/21/24 09:31 EDT

## 2024-09-23 DIAGNOSIS — I10 ESSENTIAL HYPERTENSION: ICD-10-CM

## 2024-09-23 RX ORDER — BISOPROLOL FUMARATE AND HYDROCHLOROTHIAZIDE 2.5; 6.25 MG/1; MG/1
1 TABLET ORAL EVERY MORNING
Qty: 30 TABLET | Refills: 0 | Status: SHIPPED | OUTPATIENT
Start: 2024-09-23 | End: 2024-09-25 | Stop reason: SDUPTHER

## 2024-09-25 ENCOUNTER — OFFICE VISIT (OUTPATIENT)
Dept: INTERNAL MEDICINE | Facility: CLINIC | Age: 84
End: 2024-09-25
Payer: MEDICARE

## 2024-09-25 VITALS
HEART RATE: 56 BPM | BODY MASS INDEX: 21.2 KG/M2 | SYSTOLIC BLOOD PRESSURE: 124 MMHG | DIASTOLIC BLOOD PRESSURE: 60 MMHG | WEIGHT: 108 LBS | HEIGHT: 60 IN | OXYGEN SATURATION: 99 %

## 2024-09-25 DIAGNOSIS — M81.0 OSTEOPOROSIS, UNSPECIFIED OSTEOPOROSIS TYPE, UNSPECIFIED PATHOLOGICAL FRACTURE PRESENCE: ICD-10-CM

## 2024-09-25 DIAGNOSIS — C50.912 INVASIVE DUCTAL CARCINOMA OF BREAST, FEMALE, LEFT: ICD-10-CM

## 2024-09-25 DIAGNOSIS — I10 ESSENTIAL HYPERTENSION: Primary | ICD-10-CM

## 2024-09-25 DIAGNOSIS — Z00.00 ROUTINE GENERAL MEDICAL EXAMINATION AT A HEALTH CARE FACILITY: ICD-10-CM

## 2024-09-25 DIAGNOSIS — R73.09 ABNORMAL GLUCOSE: ICD-10-CM

## 2024-09-25 DIAGNOSIS — J30.1 SEASONAL ALLERGIC RHINITIS DUE TO POLLEN: ICD-10-CM

## 2024-09-25 DIAGNOSIS — K27.9 PUD (PEPTIC ULCER DISEASE): ICD-10-CM

## 2024-09-25 DIAGNOSIS — Z00.00 ENCOUNTER FOR MEDICARE ANNUAL WELLNESS EXAM: ICD-10-CM

## 2024-09-25 DIAGNOSIS — Z23 NEEDS FLU SHOT: ICD-10-CM

## 2024-09-25 PROCEDURE — 1160F RVW MEDS BY RX/DR IN RCRD: CPT | Performed by: INTERNAL MEDICINE

## 2024-09-25 PROCEDURE — 3074F SYST BP LT 130 MM HG: CPT | Performed by: INTERNAL MEDICINE

## 2024-09-25 PROCEDURE — G0008 ADMIN INFLUENZA VIRUS VAC: HCPCS | Performed by: INTERNAL MEDICINE

## 2024-09-25 PROCEDURE — G0439 PPPS, SUBSEQ VISIT: HCPCS | Performed by: INTERNAL MEDICINE

## 2024-09-25 PROCEDURE — 1170F FXNL STATUS ASSESSED: CPT | Performed by: INTERNAL MEDICINE

## 2024-09-25 PROCEDURE — 1159F MED LIST DOCD IN RCRD: CPT | Performed by: INTERNAL MEDICINE

## 2024-09-25 PROCEDURE — 1125F AMNT PAIN NOTED PAIN PRSNT: CPT | Performed by: INTERNAL MEDICINE

## 2024-09-25 PROCEDURE — 3078F DIAST BP <80 MM HG: CPT | Performed by: INTERNAL MEDICINE

## 2024-09-25 PROCEDURE — 90662 IIV NO PRSV INCREASED AG IM: CPT | Performed by: INTERNAL MEDICINE

## 2024-09-25 PROCEDURE — 99397 PER PM REEVAL EST PAT 65+ YR: CPT | Performed by: INTERNAL MEDICINE

## 2024-09-25 RX ORDER — BISOPROLOL FUMARATE AND HYDROCHLOROTHIAZIDE 2.5; 6.25 MG/1; MG/1
1 TABLET ORAL EVERY MORNING
Qty: 90 TABLET | Refills: 3 | Status: SHIPPED | OUTPATIENT
Start: 2024-09-25

## 2024-10-30 ENCOUNTER — HOSPITAL ENCOUNTER (OUTPATIENT)
Facility: HOSPITAL | Age: 84
Discharge: HOME | End: 2024-10-30
Admitting: INTERNAL MEDICINE
Payer: MEDICARE

## 2024-10-30 ENCOUNTER — OFFICE VISIT (OUTPATIENT)
Dept: INTERNAL MEDICINE | Facility: CLINIC | Age: 84
End: 2024-10-30
Payer: MEDICARE

## 2024-10-30 VITALS
WEIGHT: 106 LBS | HEIGHT: 60 IN | SYSTOLIC BLOOD PRESSURE: 116 MMHG | HEART RATE: 62 BPM | BODY MASS INDEX: 20.81 KG/M2 | DIASTOLIC BLOOD PRESSURE: 60 MMHG | OXYGEN SATURATION: 99 %

## 2024-10-30 DIAGNOSIS — M81.0 OSTEOPOROSIS, UNSPECIFIED OSTEOPOROSIS TYPE, UNSPECIFIED PATHOLOGICAL FRACTURE PRESENCE: ICD-10-CM

## 2024-10-30 DIAGNOSIS — S09.93XA FACIAL TRAUMA, INITIAL ENCOUNTER: ICD-10-CM

## 2024-10-30 DIAGNOSIS — I10 ESSENTIAL HYPERTENSION: Primary | ICD-10-CM

## 2024-10-30 DIAGNOSIS — J30.1 SEASONAL ALLERGIC RHINITIS DUE TO POLLEN: ICD-10-CM

## 2024-10-30 DIAGNOSIS — R49.0 HOARSENESS: ICD-10-CM

## 2024-10-30 LAB
EXPIRATION DATE: NORMAL
FLUAV AG UPPER RESP QL IA.RAPID: NOT DETECTED
FLUBV AG UPPER RESP QL IA.RAPID: NOT DETECTED
INTERNAL CONTROL: NORMAL
Lab: NORMAL
SARS-COV-2 AG UPPER RESP QL IA.RAPID: NOT DETECTED

## 2024-10-30 PROCEDURE — 70150 X-RAY EXAM OF FACIAL BONES: CPT

## 2024-10-30 RX ORDER — MUPIROCIN 20 MG/G
1 OINTMENT TOPICAL 3 TIMES DAILY
Qty: 30 G | Refills: 0 | Status: SHIPPED | OUTPATIENT
Start: 2024-10-30

## 2024-10-30 NOTE — PROGRESS NOTES
Patient is a 84 y.o. female who is here for a follow up of recent fall.  Chief Complaint   Patient presents with    Fall                HPI:    Patient fell last Sunday landing on her chin.  Has soreness in left mandible and TMG area.  Difficulty eating.  Today developed hoarseness.  No fever or chills.  No sore throat.    Occasional dizziness.      History:     Patient Active Problem List   Diagnosis    Ataxia    Dizziness    Numbness and tingling of both feet    PUD (peptic ulcer disease)    Seasonal allergic rhinitis due to pollen    Osteoporosis    Essential hypertension    Pharyngitis    Leg edema, left    Invasive ductal carcinoma of breast, female, left    Anxiety    Dog bite    Esophageal stricture    Facial trauma, initial encounter    Hoarseness       Past Medical History:   Diagnosis Date    Aortic valve sclerosis     Breast cancer 2009    RIGHT    History of transfusion 1980    no reaction shadi ky    Hx of radiation therapy 2009    RT BREAST CANCER    Hypertension     Osteoporosis     Skin cancer     facial forehead       Past Surgical History:   Procedure Laterality Date    BREAST BIOPSY Right 04/27/2009    Stereotactic biopsy    BREAST LUMPECTOMY Right 05/26/2009    COLONOSCOPY      ECTOPIC PREGNANCY SURGERY      ENDOSCOPY      ENDOSCOPY  2023    EYE SURGERY      bilateral cataract    MASTECTOMY W/ SENTINEL NODE BIOPSY Bilateral 04/19/2022    Procedure: SIMPLE  MASTECTOMY, PROPHYLACTIC RIGHT SIMPLE MASTECTOMY, LEFT SENTINEL NODE BIOPSY;  Surgeon: Modesto Rodriguez MD;  Location: [facility];  Service: General;  Laterality: Bilateral;    SKIN BIOPSY      SKIN CANCER EXCISION Left 01/2023    Face- Basal Cell    TONSILLECTOMY      TUBAL ABDOMINAL LIGATION         Current Outpatient Medications on File Prior to Visit   Medication Sig    anastrozole (ARIMIDEX) 1 MG tablet Take 1 tablet by mouth Daily.    betamethasone dipropionate 0.05 % cream Apply 1 application topically to the appropriate area as  directed 2 (Two) Times a Day As Needed for Irritation or Rash.    bisoprolol-hydrochlorothiazide (ZIAC) 2.5-6.25 MG per tablet Take 1 tablet by mouth Every Morning.    Calcium Carb-Cholecalciferol (CALCIUM 1000 + D PO) Take 1 tablet/day by mouth. Vitamin D 2,000 mg    citalopram (CeleXA) 10 MG tablet Take 1 tablet by mouth Every Morning.    denosumab (Prolia) 60 MG/ML solution prefilled syringe syringe Inject 1 mL under the skin into the appropriate area as directed 1 (One) Time. Every 6 months    omeprazole (priLOSEC) 40 MG capsule Take 1 capsule by mouth 2 (Two) Times a Day.    ondansetron ODT (ZOFRAN-ODT) 4 MG disintegrating tablet Place 1 tablet on the tongue Every 8 (Eight) Hours As Needed for Nausea or Vomiting.     No current facility-administered medications on file prior to visit.       Family History   Problem Relation Age of Onset    No Known Problems Mother     Lung cancer Father     Breast cancer Sister 67    Dementia Brother     Pancreatic cancer Brother     Stroke Maternal Grandmother     Breast cancer Cousin 40    Ovarian cancer Neg Hx        Social History     Socioeconomic History    Marital status:    Tobacco Use    Smoking status: Former     Current packs/day: 0.00     Average packs/day: 1 pack/day for 15.0 years (15.0 ttl pk-yrs)     Types: Cigarettes     Start date:      Quit date:      Years since quittin.8     Passive exposure: Never    Smokeless tobacco: Never   Vaping Use    Vaping status: Never Used   Substance and Sexual Activity    Alcohol use: Yes     Alcohol/week: 1.0 standard drink of alcohol     Types: 1 Glasses of wine per week    Drug use: Never    Sexual activity: Not Currently         Review of Systems   Constitutional:  Negative for chills, fever and unexpected weight change.   HENT:  Positive for voice change. Negative for congestion, ear pain, hearing loss, rhinorrhea, sinus pressure and trouble swallowing.         See HPI   Eyes:  Negative for discharge  "and itching.   Respiratory:  Negative for chest tightness and shortness of breath.    Cardiovascular:  Positive for leg swelling (on left). Negative for chest pain and palpitations.   Gastrointestinal:  Negative for abdominal pain, blood in stool, constipation, diarrhea and vomiting.   Endocrine: Negative for polydipsia and polyuria.   Genitourinary:  Negative for difficulty urinating, dysuria, enuresis, frequency, hematuria and urgency.        3/22 mammogram   Musculoskeletal:  Negative for arthralgias, back pain, gait problem and joint swelling.        Left bunion   Skin:  Negative for rash.   Allergic/Immunologic: Negative for immunocompromised state.   Neurological:  Positive for dizziness and light-headedness. Negative for syncope, weakness, numbness and headaches.   Hematological:  Does not bruise/bleed easily.   Psychiatric/Behavioral:  Negative for behavioral problems, dysphoric mood and sleep disturbance. The patient is nervous/anxious (better).        /60   Pulse 62   Ht 152.4 cm (60\")   Wt 48.1 kg (106 lb)   SpO2 99%   BMI 20.70 kg/m²       Physical Exam  Constitutional:       Appearance: Normal appearance. She is well-developed.   HENT:      Head: Normocephalic and atraumatic.      Comments: Upper chin abrasion/eccymosis     Right Ear: External ear normal.      Left Ear: External ear normal.      Nose: Nose normal.      Mouth/Throat:      Mouth: Mucous membranes are moist.      Pharynx: Oropharynx is clear.   Eyes:      Extraocular Movements: Extraocular movements intact.      Conjunctiva/sclera: Conjunctivae normal.      Pupils: Pupils are equal, round, and reactive to light.   Cardiovascular:      Rate and Rhythm: Normal rate and regular rhythm.      Heart sounds: Normal heart sounds.   Pulmonary:      Effort: Pulmonary effort is normal.      Breath sounds: Normal breath sounds.   Abdominal:      General: Bowel sounds are normal.      Palpations: Abdomen is soft.   Musculoskeletal:         " General: Deformity (kyphosis) present. Normal range of motion.      Cervical back: Normal range of motion and neck supple.      Left lower leg: Edema present.   Lymphadenopathy:      Cervical: No cervical adenopathy.   Skin:     General: Skin is warm and dry.   Neurological:      General: No focal deficit present.      Mental Status: She is alert and oriented to person, place, and time.   Psychiatric:         Mood and Affect: Mood normal.         Behavior: Behavior normal.         Thought Content: Thought content normal.         Procedure:      Historical Data:    HTN-cont ziac, advised goal of 130/80, advised to notify if gets dizzy sec to low HR  PUD-controlled on PPI   Rhinitis-cont claritin  Osteoporosis-Advised weight bearing exercise and at least vit D 2000 IU qd, on Prolia  Abnormal glucose-counseled on diet, labs noted  Breast cancer-cont Arimidex  Chin abrasion-add Bactroban  Hoarseness-check covid     9/25 labs noted and dw patient    Current Outpatient Medications:     anastrozole (ARIMIDEX) 1 MG tablet, Take 1 tablet by mouth Daily., Disp: 90 tablet, Rfl: 3    betamethasone dipropionate 0.05 % cream, Apply 1 application topically to the appropriate area as directed 2 (Two) Times a Day As Needed for Irritation or Rash., Disp: 45 g, Rfl: 1    bisoprolol-hydrochlorothiazide (ZIAC) 2.5-6.25 MG per tablet, Take 1 tablet by mouth Every Morning., Disp: 90 tablet, Rfl: 3    Calcium Carb-Cholecalciferol (CALCIUM 1000 + D PO), Take 1 tablet/day by mouth. Vitamin D 2,000 mg, Disp: , Rfl:     citalopram (CeleXA) 10 MG tablet, Take 1 tablet by mouth Every Morning., Disp: 90 tablet, Rfl: 3    denosumab (Prolia) 60 MG/ML solution prefilled syringe syringe, Inject 1 mL under the skin into the appropriate area as directed 1 (One) Time. Every 6 months, Disp: , Rfl:     omeprazole (priLOSEC) 40 MG capsule, Take 1 capsule by mouth 2 (Two) Times a Day., Disp: 180 capsule, Rfl: 3    ondansetron ODT (ZOFRAN-ODT) 4 MG  disintegrating tablet, Place 1 tablet on the tongue Every 8 (Eight) Hours As Needed for Nausea or Vomiting., Disp: 10 tablet, Rfl: 0    mupirocin (BACTROBAN) 2 % ointment, Apply 1 Application topically to the appropriate area as directed 3 (Three) Times a Day., Disp: 30 g, Rfl: 0        Diagnoses and all orders for this visit:    1. Essential hypertension (Primary)    2. Seasonal allergic rhinitis due to pollen    3. Osteoporosis, unspecified osteoporosis type, unspecified pathological fracture presence    4. Facial trauma, initial encounter  -     XR facial bones 3+ vw  -     mupirocin (BACTROBAN) 2 % ointment; Apply 1 Application topically to the appropriate area as directed 3 (Three) Times a Day.  Dispense: 30 g; Refill: 0    5. Hoarseness  -     POCT SARS-CoV-2 Antigen CAMI + Flu

## 2025-01-13 DIAGNOSIS — M81.0 OSTEOPOROSIS, UNSPECIFIED OSTEOPOROSIS TYPE, UNSPECIFIED PATHOLOGICAL FRACTURE PRESENCE: ICD-10-CM

## 2025-01-13 DIAGNOSIS — C50.912 INVASIVE DUCTAL CARCINOMA OF BREAST, FEMALE, LEFT: ICD-10-CM

## 2025-01-13 RX ORDER — ANASTROZOLE 1 MG/1
1 TABLET ORAL DAILY
Qty: 90 TABLET | Refills: 3 | Status: SHIPPED | OUTPATIENT
Start: 2025-01-13

## 2025-01-13 NOTE — TELEPHONE ENCOUNTER
Caller: Cecile Bennett    Relationship: Self    Best call back number: [phone]    Requested Prescriptions:   Requested Prescriptions     Pending Prescriptions Disp Refills    anastrozole (ARIMIDEX) 1 MG tablet 90 tablet 3     Sig: Take 1 tablet by mouth Daily.        Pharmacy where request should be sent: [facility] [address] [place] [address] [phone] [facility] [phone] FX     Last office visit with prescribing clinician: 2/14/2024   Last telemedicine visit with prescribing clinician: Visit date not found   Next office visit with prescribing clinician: 2/13/2025     Does the patient have less than a 3 day supply:  [] Yes  [x] No    Would you like a call back once the refill request has been completed: [] Yes [x] No    If the office needs to give you a call back, can they leave a voicemail: [] Yes [x] No

## 2025-02-05 RX ORDER — BETAMETHASONE DIPROPIONATE 0.5 MG/G
1 CREAM TOPICAL 2 TIMES DAILY PRN
Qty: 45 G | Refills: 1 | Status: SHIPPED | OUTPATIENT
Start: 2025-02-05

## 2025-02-05 NOTE — TELEPHONE ENCOUNTER
Caller: Cecile Bennett    Relationship: Self    Best call back number: [phone]     Requested Prescriptions:   Requested Prescriptions     Pending Prescriptions Disp Refills    betamethasone dipropionate (DIPROSONE) 0.05 % cream 45 g 1     Sig: Apply 1 Application topically to the appropriate area as directed 2 (Two) Times a Day As Needed for Irritation or Rash.        Pharmacy where request should be sent: [address] [phone] [facility] [phone] FX     Last office visit with prescribing clinician: 10/30/2024   Last telemedicine visit with prescribing clinician: Visit date not found   Next office visit with prescribing clinician: 4/4/2025         Does the patient have less than a 3 day supply:  [x] Yes  [] No    Would you like a call back once the refill request has been completed: [] Yes [x] No    If the office needs to give you a call back, can they leave a voicemail: [x] Yes [] No    Lela Davenport Rep   02/05/25 11:01 EST

## 2025-02-13 ENCOUNTER — HOSPITAL ENCOUNTER (OUTPATIENT)
Dept: ONCOLOGY | Facility: HOSPITAL | Age: 85
Discharge: HOME | End: 2025-02-13
Admitting: INTERNAL MEDICINE
Payer: MEDICARE

## 2025-02-13 ENCOUNTER — OFFICE VISIT (OUTPATIENT)
Dept: ONCOLOGY | Facility: CLINIC | Age: 85
End: 2025-02-13
Payer: MEDICARE

## 2025-02-13 VITALS
RESPIRATION RATE: 16 BRPM | TEMPERATURE: 97.1 F | BODY MASS INDEX: 21.4 KG/M2 | HEIGHT: 60 IN | OXYGEN SATURATION: 98 % | WEIGHT: 109 LBS | HEART RATE: 49 BPM | DIASTOLIC BLOOD PRESSURE: 69 MMHG | SYSTOLIC BLOOD PRESSURE: 154 MMHG

## 2025-02-13 DIAGNOSIS — M81.0 OSTEOPOROSIS, UNSPECIFIED OSTEOPOROSIS TYPE, UNSPECIFIED PATHOLOGICAL FRACTURE PRESENCE: Primary | ICD-10-CM

## 2025-02-13 DIAGNOSIS — C50.912 INVASIVE DUCTAL CARCINOMA OF BREAST, FEMALE, LEFT: ICD-10-CM

## 2025-02-13 LAB
ALBUMIN SERPL-MCNC: 4.1 G/DL (ref 3.5–5.2)
ALBUMIN/GLOB SERPL: 1.5 G/DL
ALP SERPL-CCNC: 73 U/L (ref 39–117)
ALT SERPL W P-5'-P-CCNC: 16 U/L (ref 1–33)
ANION GAP SERPL CALCULATED.3IONS-SCNC: 6 MMOL/L (ref 5–15)
AST SERPL-CCNC: 50 U/L (ref 1–32)
BASOPHILS # BLD AUTO: 0.02 10*3/MM3 (ref 0–0.2)
BASOPHILS NFR BLD AUTO: 0.3 % (ref 0–1.5)
BILIRUB SERPL-MCNC: 0.5 MG/DL (ref 0–1.2)
BUN SERPL-MCNC: 19 MG/DL (ref 8–23)
BUN/CREAT SERPL: 22.6 (ref 7–25)
CALCIUM SPEC-SCNC: 9.6 MG/DL (ref 8.6–10.5)
CHLORIDE SERPL-SCNC: 105 MMOL/L (ref 98–107)
CO2 SERPL-SCNC: 29 MMOL/L (ref 22–29)
CREAT SERPL-MCNC: 0.84 MG/DL (ref 0.57–1)
DEPRECATED RDW RBC AUTO: 44.5 FL (ref 37–54)
EGFRCR SERPLBLD CKD-EPI 2021: 69.5 ML/MIN/1.73
EOSINOPHIL # BLD AUTO: 0.09 10*3/MM3 (ref 0–0.4)
EOSINOPHIL NFR BLD AUTO: 1.5 % (ref 0.3–6.2)
ERYTHROCYTE [DISTWIDTH] IN BLOOD BY AUTOMATED COUNT: 13.3 % (ref 12.3–15.4)
GLOBULIN UR ELPH-MCNC: 2.7 GM/DL
GLUCOSE SERPL-MCNC: 86 MG/DL (ref 65–99)
HCT VFR BLD AUTO: 39.5 % (ref 34–46.6)
HGB BLD-MCNC: 12.9 G/DL (ref 12–15.9)
IMM GRANULOCYTES # BLD AUTO: 0.01 10*3/MM3 (ref 0–0.05)
IMM GRANULOCYTES NFR BLD AUTO: 0.2 % (ref 0–0.5)
LYMPHOCYTES # BLD AUTO: 1.46 10*3/MM3 (ref 0.7–3.1)
LYMPHOCYTES NFR BLD AUTO: 24.5 % (ref 19.6–45.3)
MAGNESIUM SERPL-MCNC: 2.1 MG/DL (ref 1.6–2.4)
MCH RBC QN AUTO: 29.9 PG (ref 26.6–33)
MCHC RBC AUTO-ENTMCNC: 32.7 G/DL (ref 31.5–35.7)
MCV RBC AUTO: 91.4 FL (ref 79–97)
MONOCYTES # BLD AUTO: 0.6 10*3/MM3 (ref 0.1–0.9)
MONOCYTES NFR BLD AUTO: 10.1 % (ref 5–12)
NEUTROPHILS NFR BLD AUTO: 3.79 10*3/MM3 (ref 1.7–7)
NEUTROPHILS NFR BLD AUTO: 63.4 % (ref 42.7–76)
PHOSPHATE SERPL-MCNC: 3.8 MG/DL (ref 2.5–4.5)
PLATELET # BLD AUTO: 169 10*3/MM3 (ref 140–450)
PMV BLD AUTO: 11.3 FL (ref 6–12)
POTASSIUM SERPL-SCNC: 4.1 MMOL/L (ref 3.5–5.2)
PROT SERPL-MCNC: 6.8 G/DL (ref 6–8.5)
RBC # BLD AUTO: 4.32 10*6/MM3 (ref 3.77–5.28)
SODIUM SERPL-SCNC: 140 MMOL/L (ref 136–145)
WBC NRBC COR # BLD AUTO: 5.97 10*3/MM3 (ref 3.4–10.8)

## 2025-02-13 PROCEDURE — 3077F SYST BP >= 140 MM HG: CPT | Performed by: INTERNAL MEDICINE

## 2025-02-13 PROCEDURE — 84100 ASSAY OF PHOSPHORUS: CPT | Performed by: INTERNAL MEDICINE

## 2025-02-13 PROCEDURE — 25010000002 DENOSUMAB 60 MG/ML SOLUTION PREFILLED SYRINGE: Performed by: INTERNAL MEDICINE

## 2025-02-13 PROCEDURE — 83735 ASSAY OF MAGNESIUM: CPT | Performed by: INTERNAL MEDICINE

## 2025-02-13 PROCEDURE — 3078F DIAST BP <80 MM HG: CPT | Performed by: INTERNAL MEDICINE

## 2025-02-13 PROCEDURE — 99214 OFFICE O/P EST MOD 30 MIN: CPT | Performed by: INTERNAL MEDICINE

## 2025-02-13 PROCEDURE — 1126F AMNT PAIN NOTED NONE PRSNT: CPT | Performed by: INTERNAL MEDICINE

## 2025-02-13 PROCEDURE — 80053 COMPREHEN METABOLIC PANEL: CPT | Performed by: INTERNAL MEDICINE

## 2025-02-13 PROCEDURE — 85025 COMPLETE CBC W/AUTO DIFF WBC: CPT | Performed by: INTERNAL MEDICINE

## 2025-02-13 PROCEDURE — 96372 THER/PROPH/DIAG INJ SC/IM: CPT

## 2025-02-13 RX ADMIN — DENOSUMAB 60 MG: 60 INJECTION SUBCUTANEOUS at 12:40

## 2025-02-13 NOTE — PROGRESS NOTES
"        PROBLEM LIST:  1. dY3N3E6 ER+ (95%) KS+ (95%) Her2 negative (1+) invasive ductal carcinoma of the left breast  A) bilateral mastectomy on 4/19/22.  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  B) anastrozole started May 2022  2. History of right breast cancer  3. Hypertension  4. osteoporosis    Subjective     CHIEF COMPLAINT: breast cancer    HISTORY OF PRESENT ILLNESS:   Cecile Bennett returns for follow-up.  She continues on anastrozole.  She is tolerating it well.            Objective      /69   Pulse (!) 49   Temp 97.1 °F (36.2 °C) (Temporal)   Resp 16   Ht 152.4 cm (60\")   Wt 49.4 kg (109 lb)   SpO2 98%   BMI 21.29 kg/m²    Vitals:    02/13/25 1130   PainSc: 0-No pain                   ECOG score: 0             General: well appearing female in no acute distress  Neuro: alert and oriented  HEENT: sclera anicteric, oropharynx clear  Lymphatics: no cervical, supraclavicular, or axillary adenopathy  Cardiovascular: regular rate and rhythm, no murmurs  Lungs: clear to auscultation bilaterally  Abdomen: soft, nontender, nondistended.  No palpable organomegaly  Extremeties: no lower extremity edema  Skin: no rashes, lesions, bruising, or petechiae  Psych: mood and affect appropriate    I have reexamined the patient and the results are consistent with the previously documented exam. Liza Guerra MD        RECENT LABS:  Lab Results   Component Value Date    WBC 7.18 08/15/2024    HGB 12.7 08/15/2024    HCT 39.3 08/15/2024    MCV 94.7 08/15/2024     08/15/2024       Lab Results   Component Value Date    GLUCOSE 59 (L) 08/15/2024    BUN 20 08/15/2024    CREATININE 0.83 08/15/2024    EGFRIFNONA 62 07/14/2021    BCR 24.1 08/15/2024    K 4.1 08/15/2024    CO2 28.0 08/15/2024    CALCIUM 9.1 08/15/2024    ALBUMIN 4.0 08/15/2024    AST 31 08/15/2024    ALT 14 08/15/2024                  ASSESSMENT AND PLAN:     Cecile Bennett is a 84 y.o. female  with a " T2N0M0 ER+ Her2 negative IDC of the left breast.     She began anastrozole early May 2022 and is tolerating it well.  We will continue this for a minimum of 5 years.      Osteoporosis: Bone density from 7/16/2024 shows osteoporosis.  We will plan on continuing Prolia and we will repeat bone density scan in 2 years.    Follow-up 6 months.                      Liza Guerra MD  [facility]    2/13/2025          CC:

## 2025-02-20 ENCOUNTER — TELEPHONE (OUTPATIENT)
Dept: ONCOLOGY | Facility: CLINIC | Age: 85
End: 2025-02-20
Payer: MEDICARE

## 2025-02-20 NOTE — TELEPHONE ENCOUNTER
I called patient back and told her that we recommend 600mg of calcium per ADELE Falk and that patient's calcium levels have been adequate when we check her labs.  She verbalized understanding.

## 2025-02-20 NOTE — TELEPHONE ENCOUNTER
Patient has only been taking 600 mg of calcium is she supposed to be taking 2000 mg? Please call.

## 2025-04-04 ENCOUNTER — LAB (OUTPATIENT)
Dept: INTERNAL MEDICINE | Facility: CLINIC | Age: 85
End: 2025-04-04
Payer: MEDICARE

## 2025-04-04 ENCOUNTER — OFFICE VISIT (OUTPATIENT)
Dept: INTERNAL MEDICINE | Facility: CLINIC | Age: 85
End: 2025-04-04
Payer: MEDICARE

## 2025-04-04 VITALS
SYSTOLIC BLOOD PRESSURE: 140 MMHG | HEIGHT: 60 IN | HEART RATE: 56 BPM | OXYGEN SATURATION: 100 % | DIASTOLIC BLOOD PRESSURE: 60 MMHG | BODY MASS INDEX: 21.2 KG/M2 | WEIGHT: 108 LBS

## 2025-04-04 DIAGNOSIS — F41.9 ANXIETY: ICD-10-CM

## 2025-04-04 DIAGNOSIS — C50.912 INVASIVE DUCTAL CARCINOMA OF BREAST, FEMALE, LEFT: ICD-10-CM

## 2025-04-04 DIAGNOSIS — M81.0 OSTEOPOROSIS, UNSPECIFIED OSTEOPOROSIS TYPE, UNSPECIFIED PATHOLOGICAL FRACTURE PRESENCE: ICD-10-CM

## 2025-04-04 DIAGNOSIS — I10 ESSENTIAL HYPERTENSION: Primary | ICD-10-CM

## 2025-04-04 DIAGNOSIS — K27.9 PUD (PEPTIC ULCER DISEASE): ICD-10-CM

## 2025-04-04 PROCEDURE — 80053 COMPREHEN METABOLIC PANEL: CPT | Performed by: INTERNAL MEDICINE

## 2025-04-04 PROCEDURE — 84443 ASSAY THYROID STIM HORMONE: CPT | Performed by: INTERNAL MEDICINE

## 2025-04-04 PROCEDURE — 80061 LIPID PANEL: CPT | Performed by: INTERNAL MEDICINE

## 2025-04-04 PROCEDURE — 36415 COLL VENOUS BLD VENIPUNCTURE: CPT | Performed by: INTERNAL MEDICINE

## 2025-04-04 NOTE — PROGRESS NOTES
Patient is a 85 y.o. female who is here for a follow up of hypertension.  Chief Complaint   Patient presents with    Hypertension         HPI:    Here for mgmt of HTN and PUD.  Allergies are doing well.  No dizziness or lightheadedness.  No HAs.  No abdominal pains.  Continues with Arimidex.  No fever or chills.  No nausea or emesis.     History:     Patient Active Problem List   Diagnosis    Ataxia    Dizziness    Numbness and tingling of both feet    PUD (peptic ulcer disease)    Seasonal allergic rhinitis due to pollen    Osteoporosis    Essential hypertension    Pharyngitis    Leg edema, left    Invasive ductal carcinoma of breast, female, left    Anxiety    Dog bite    Esophageal stricture    Facial trauma, initial encounter    Hoarseness       Past Medical History:   Diagnosis Date    Aortic valve sclerosis     Breast cancer 2009    RIGHT    History of transfusion 1980    no reaction shadi ky    Hx of radiation therapy 2009    RT BREAST CANCER    Hypertension     Osteoporosis     Skin cancer     facial forehead       Past Surgical History:   Procedure Laterality Date    BREAST BIOPSY Right 04/27/2009    Stereotactic biopsy    BREAST LUMPECTOMY Right 05/26/2009    COLONOSCOPY      ECTOPIC PREGNANCY SURGERY      ENDOSCOPY      ENDOSCOPY  2023    EYE SURGERY      bilateral cataract    MASTECTOMY W/ SENTINEL NODE BIOPSY Bilateral 04/19/2022    Procedure: SIMPLE  MASTECTOMY, PROPHYLACTIC RIGHT SIMPLE MASTECTOMY, LEFT SENTINEL NODE BIOPSY;  Surgeon: Modesto Rodriguez MD;  Location: [facility];  Service: General;  Laterality: Bilateral;    SKIN BIOPSY      SKIN CANCER EXCISION Left 01/2023    Face- Basal Cell    TONSILLECTOMY      TUBAL ABDOMINAL LIGATION         Current Outpatient Medications on File Prior to Visit   Medication Sig    anastrozole (ARIMIDEX) 1 MG tablet Take 1 tablet by mouth Daily.    betamethasone dipropionate (DIPROSONE) 0.05 % cream Apply 1 Application topically to the appropriate area as  directed 2 (Two) Times a Day As Needed for Irritation or Rash.    bisoprolol-hydrochlorothiazide (ZIAC) 2.5-6.25 MG per tablet Take 1 tablet by mouth Every Morning.    Calcium Carb-Cholecalciferol (CALCIUM 1000 + D PO) Take 1 tablet/day by mouth. Vitamin D 2,000 mg    citalopram (CeleXA) 10 MG tablet Take 1 tablet by mouth Every Morning.    denosumab (Prolia) 60 MG/ML solution prefilled syringe syringe Inject 1 mL under the skin into the appropriate area as directed 1 (One) Time. Every 6 months    omeprazole (priLOSEC) 40 MG capsule Take 1 capsule by mouth 2 (Two) Times a Day.    ondansetron ODT (ZOFRAN-ODT) 4 MG disintegrating tablet Place 1 tablet on the tongue Every 8 (Eight) Hours As Needed for Nausea or Vomiting.    mupirocin (BACTROBAN) 2 % ointment Apply 1 Application topically to the appropriate area as directed 3 (Three) Times a Day. (Patient not taking: Reported on 2025)     No current facility-administered medications on file prior to visit.       Family History   Problem Relation Age of Onset    No Known Problems Mother     Lung cancer Father     Breast cancer Sister 67    Dementia Brother     Pancreatic cancer Brother     Stroke Maternal Grandmother     Breast cancer Cousin 40    Ovarian cancer Neg Hx        Social History     Socioeconomic History    Marital status:    Tobacco Use    Smoking status: Former     Current packs/day: 0.00     Average packs/day: 1 pack/day for 15.0 years (15.0 ttl pk-yrs)     Types: Cigarettes     Start date:      Quit date:      Years since quittin.2     Passive exposure: Never    Smokeless tobacco: Never   Vaping Use    Vaping status: Never Used   Substance and Sexual Activity    Alcohol use: Yes     Alcohol/week: 1.0 standard drink of alcohol     Types: 1 Glasses of wine per week    Drug use: Never    Sexual activity: Not Currently         Review of Systems   Constitutional:  Negative for chills, fever and unexpected weight change.   HENT:   "Negative for congestion, ear pain, hearing loss, rhinorrhea, sinus pressure and trouble swallowing.         See HPI   Eyes:  Negative for discharge and itching.   Respiratory:  Negative for chest tightness and shortness of breath.    Cardiovascular:  Positive for leg swelling (on left). Negative for chest pain and palpitations.   Gastrointestinal:  Negative for abdominal pain, blood in stool, constipation, diarrhea and vomiting.   Endocrine: Negative for polydipsia and polyuria.   Genitourinary:  Negative for difficulty urinating, dysuria, enuresis, frequency, hematuria and urgency.        3/22 mammogram   Musculoskeletal:  Negative for arthralgias, back pain, gait problem and joint swelling.        Left bunion   Skin:  Negative for rash.   Allergic/Immunologic: Negative for immunocompromised state.   Neurological:  Negative for syncope, weakness, numbness and headaches.   Hematological:  Does not bruise/bleed easily.   Psychiatric/Behavioral:  Negative for behavioral problems, dysphoric mood and sleep disturbance. The patient is nervous/anxious (better).        /60   Pulse 56   Ht 152.4 cm (60\")   Wt 49 kg (108 lb)   SpO2 100%   BMI 21.09 kg/m²       Physical Exam  Constitutional:       Appearance: Normal appearance. She is well-developed.   HENT:      Head: Normocephalic and atraumatic.      Comments: Upper chin abrasion/eccymosis     Right Ear: External ear normal.      Left Ear: External ear normal.      Nose: Nose normal.      Mouth/Throat:      Mouth: Mucous membranes are moist.      Pharynx: Oropharynx is clear.   Eyes:      Extraocular Movements: Extraocular movements intact.      Conjunctiva/sclera: Conjunctivae normal.      Pupils: Pupils are equal, round, and reactive to light.   Cardiovascular:      Rate and Rhythm: Normal rate and regular rhythm.      Heart sounds: Normal heart sounds.   Pulmonary:      Effort: Pulmonary effort is normal.      Breath sounds: Normal breath sounds.   Abdominal: "      General: Bowel sounds are normal.      Palpations: Abdomen is soft.   Musculoskeletal:         General: Deformity (kyphosis) present. Normal range of motion.      Cervical back: Normal range of motion and neck supple.      Left lower leg: Edema present.   Lymphadenopathy:      Cervical: No cervical adenopathy.   Skin:     General: Skin is warm and dry.   Neurological:      General: No focal deficit present.      Mental Status: She is alert and oriented to person, place, and time.   Psychiatric:         Mood and Affect: Mood normal.         Behavior: Behavior normal.         Thought Content: Thought content normal.         Procedure:      Historical Data:    HTN-cont ziac, advised goal of 130/80, advised to notify if gets dizzy sec to low HR  PUD-controlled on PPI   Rhinitis-cont claritin  Osteoporosis-Advised weight bearing exercise and at least vit D 2000 IU qd, on Prolia  Abnormal glucose-counseled on diet, labs today at goal  Breast cancer-cont Arimidex    4/4 labs noted and dw patient, will send out diet handout    Current Outpatient Medications:     anastrozole (ARIMIDEX) 1 MG tablet, Take 1 tablet by mouth Daily., Disp: 90 tablet, Rfl: 3    betamethasone dipropionate (DIPROSONE) 0.05 % cream, Apply 1 Application topically to the appropriate area as directed 2 (Two) Times a Day As Needed for Irritation or Rash., Disp: 45 g, Rfl: 1    bisoprolol-hydrochlorothiazide (ZIAC) 2.5-6.25 MG per tablet, Take 1 tablet by mouth Every Morning., Disp: 90 tablet, Rfl: 3    Calcium Carb-Cholecalciferol (CALCIUM 1000 + D PO), Take 1 tablet/day by mouth. Vitamin D 2,000 mg, Disp: , Rfl:     citalopram (CeleXA) 10 MG tablet, Take 1 tablet by mouth Every Morning., Disp: 90 tablet, Rfl: 3    denosumab (Prolia) 60 MG/ML solution prefilled syringe syringe, Inject 1 mL under the skin into the appropriate area as directed 1 (One) Time. Every 6 months, Disp: , Rfl:     omeprazole (priLOSEC) 40 MG capsule, Take 1 capsule by mouth 2  (Two) Times a Day., Disp: 180 capsule, Rfl: 3    ondansetron ODT (ZOFRAN-ODT) 4 MG disintegrating tablet, Place 1 tablet on the tongue Every 8 (Eight) Hours As Needed for Nausea or Vomiting., Disp: 10 tablet, Rfl: 0    mupirocin (BACTROBAN) 2 % ointment, Apply 1 Application topically to the appropriate area as directed 3 (Three) Times a Day. (Patient not taking: Reported on 4/4/2025), Disp: 30 g, Rfl: 0        Diagnoses and all orders for this visit:    1. Essential hypertension (Primary)  -     Comprehensive Metabolic Panel  -     Lipid Panel  -     TSH    2. PUD (peptic ulcer disease)    3. Invasive ductal carcinoma of breast, female, left    4. Anxiety    5. Osteoporosis, unspecified osteoporosis type, unspecified pathological fracture presence

## 2025-04-05 LAB
ALBUMIN SERPL-MCNC: 4.1 G/DL (ref 3.5–5.2)
ALBUMIN/GLOB SERPL: 1.5 G/DL
ALP SERPL-CCNC: 62 U/L (ref 39–117)
ALT SERPL W P-5'-P-CCNC: 14 U/L (ref 1–33)
ANION GAP SERPL CALCULATED.3IONS-SCNC: 10.7 MMOL/L (ref 5–15)
AST SERPL-CCNC: 32 U/L (ref 1–32)
BILIRUB SERPL-MCNC: 0.5 MG/DL (ref 0–1.2)
BUN SERPL-MCNC: 14 MG/DL (ref 8–23)
BUN/CREAT SERPL: 15.4 (ref 7–25)
CALCIUM SPEC-SCNC: 9.4 MG/DL (ref 8.6–10.5)
CHLORIDE SERPL-SCNC: 103 MMOL/L (ref 98–107)
CHOLEST SERPL-MCNC: 208 MG/DL (ref 0–200)
CO2 SERPL-SCNC: 26.3 MMOL/L (ref 22–29)
CREAT SERPL-MCNC: 0.91 MG/DL (ref 0.57–1)
EGFRCR SERPLBLD CKD-EPI 2021: 62.7 ML/MIN/1.73
GLOBULIN UR ELPH-MCNC: 2.7 GM/DL
GLUCOSE SERPL-MCNC: 76 MG/DL (ref 65–99)
HDLC SERPL-MCNC: 81 MG/DL (ref 40–60)
LDLC SERPL CALC-MCNC: 111 MG/DL (ref 0–100)
LDLC/HDLC SERPL: 1.35 {RATIO}
POTASSIUM SERPL-SCNC: 4.2 MMOL/L (ref 3.5–5.2)
PROT SERPL-MCNC: 6.8 G/DL (ref 6–8.5)
SODIUM SERPL-SCNC: 140 MMOL/L (ref 136–145)
TRIGL SERPL-MCNC: 89 MG/DL (ref 0–150)
TSH SERPL DL<=0.05 MIU/L-ACNC: 3.72 UIU/ML (ref 0.27–4.2)
VLDLC SERPL-MCNC: 16 MG/DL (ref 5–40)

## 2025-04-16 ENCOUNTER — OFFICE VISIT (OUTPATIENT)
Dept: INTERNAL MEDICINE | Facility: CLINIC | Age: 85
End: 2025-04-16
Payer: MEDICARE

## 2025-04-16 VITALS
DIASTOLIC BLOOD PRESSURE: 68 MMHG | BODY MASS INDEX: 21.2 KG/M2 | HEIGHT: 60 IN | SYSTOLIC BLOOD PRESSURE: 120 MMHG | OXYGEN SATURATION: 100 % | HEART RATE: 69 BPM | WEIGHT: 108 LBS

## 2025-04-16 DIAGNOSIS — C50.912 INVASIVE DUCTAL CARCINOMA OF BREAST, FEMALE, LEFT: ICD-10-CM

## 2025-04-16 DIAGNOSIS — S51.812A SKIN TEAR OF LEFT FOREARM WITHOUT COMPLICATION, INITIAL ENCOUNTER: ICD-10-CM

## 2025-04-16 DIAGNOSIS — E78.00 ELEVATED LDL CHOLESTEROL LEVEL: ICD-10-CM

## 2025-04-16 DIAGNOSIS — M81.0 OSTEOPOROSIS, UNSPECIFIED OSTEOPOROSIS TYPE, UNSPECIFIED PATHOLOGICAL FRACTURE PRESENCE: ICD-10-CM

## 2025-04-16 DIAGNOSIS — K27.9 PUD (PEPTIC ULCER DISEASE): ICD-10-CM

## 2025-04-16 DIAGNOSIS — I10 ESSENTIAL HYPERTENSION: Primary | ICD-10-CM

## 2025-04-16 NOTE — PROGRESS NOTES
Patient is a 85 y.o. female who is here for L arm wound  Chief Complaint   Patient presents with    Arm Injury         HPI:    Patient with left forearm skin tear.  Onset yesterday.  No fever or chills.  Some discomfort.  Some bleeding.  No dizziness or lightheadedness.  No abdominal pains.     History:     Patient Active Problem List   Diagnosis    Ataxia    Dizziness    Numbness and tingling of both feet    PUD (peptic ulcer disease)    Seasonal allergic rhinitis due to pollen    Osteoporosis    Essential hypertension    Pharyngitis    Leg edema, left    Invasive ductal carcinoma of breast, female, left    Anxiety    Dog bite    Esophageal stricture    Facial trauma, initial encounter    Hoarseness    Skin tear of left forearm without complication    Elevated LDL cholesterol level       Past Medical History:   Diagnosis Date    Aortic valve sclerosis     Breast cancer 2009    RIGHT    History of transfusion 1980    no reaction shadi ky    Hx of radiation therapy 2009    RT BREAST CANCER    Hypertension     Osteoporosis     Skin cancer     facial forehead       Past Surgical History:   Procedure Laterality Date    BREAST BIOPSY Right 04/27/2009    Stereotactic biopsy    BREAST LUMPECTOMY Right 05/26/2009    COLONOSCOPY      ECTOPIC PREGNANCY SURGERY      ENDOSCOPY      ENDOSCOPY  2023    EYE SURGERY      bilateral cataract    MASTECTOMY W/ SENTINEL NODE BIOPSY Bilateral 04/19/2022    Procedure: SIMPLE  MASTECTOMY, PROPHYLACTIC RIGHT SIMPLE MASTECTOMY, LEFT SENTINEL NODE BIOPSY;  Surgeon: Modesto Rodriguez MD;  Location: [facility];  Service: General;  Laterality: Bilateral;    SKIN BIOPSY      SKIN CANCER EXCISION Left 01/2023    Face- Basal Cell    TONSILLECTOMY      TUBAL ABDOMINAL LIGATION         Current Outpatient Medications on File Prior to Visit   Medication Sig    anastrozole (ARIMIDEX) 1 MG tablet Take 1 tablet by mouth Daily.    betamethasone dipropionate (DIPROSONE) 0.05 % cream Apply 1  Application topically to the appropriate area as directed 2 (Two) Times a Day As Needed for Irritation or Rash.    bisoprolol-hydrochlorothiazide (ZIAC) 2.5-6.25 MG per tablet Take 1 tablet by mouth Every Morning.    Calcium Carb-Cholecalciferol (CALCIUM 1000 + D PO) Take 1 tablet/day by mouth. Vitamin D 2,000 mg    citalopram (CeleXA) 10 MG tablet Take 1 tablet by mouth Every Morning.    denosumab (Prolia) 60 MG/ML solution prefilled syringe syringe Inject 1 mL under the skin into the appropriate area as directed 1 (One) Time. Every 6 months    omeprazole (priLOSEC) 40 MG capsule Take 1 capsule by mouth 2 (Two) Times a Day.    ondansetron ODT (ZOFRAN-ODT) 4 MG disintegrating tablet Place 1 tablet on the tongue Every 8 (Eight) Hours As Needed for Nausea or Vomiting.     No current facility-administered medications on file prior to visit.       Family History   Problem Relation Age of Onset    No Known Problems Mother     Lung cancer Father     Breast cancer Sister 67    Dementia Brother     Pancreatic cancer Brother     Stroke Maternal Grandmother     Breast cancer Cousin 40    Ovarian cancer Neg Hx        Social History     Socioeconomic History    Marital status:    Tobacco Use    Smoking status: Former     Current packs/day: 0.00     Average packs/day: 1 pack/day for 15.0 years (15.0 ttl pk-yrs)     Types: Cigarettes     Start date:      Quit date:      Years since quittin.3     Passive exposure: Never    Smokeless tobacco: Never   Vaping Use    Vaping status: Never Used   Substance and Sexual Activity    Alcohol use: Yes     Alcohol/week: 1.0 standard drink of alcohol     Types: 1 Glasses of wine per week    Drug use: Never    Sexual activity: Not Currently         Review of Systems   Constitutional:  Negative for chills, fever and unexpected weight change.   HENT:  Negative for congestion, ear pain, hearing loss, rhinorrhea, sinus pressure and trouble swallowing.    Eyes:  Negative for  "discharge and itching.   Respiratory:  Negative for chest tightness and shortness of breath.    Cardiovascular:  Positive for leg swelling (on left). Negative for chest pain and palpitations.   Gastrointestinal:  Negative for abdominal pain, blood in stool, constipation, diarrhea and vomiting.   Endocrine: Negative for polydipsia and polyuria.   Genitourinary:  Negative for difficulty urinating, dysuria, enuresis, frequency, hematuria and urgency.        3/22 mammogram   Musculoskeletal:  Negative for arthralgias, back pain, gait problem and joint swelling.        Left bunion   Skin:  Positive for wound. Negative for rash.   Allergic/Immunologic: Negative for immunocompromised state.   Neurological:  Negative for syncope, weakness, numbness and headaches.   Hematological:  Does not bruise/bleed easily.   Psychiatric/Behavioral:  Negative for behavioral problems, dysphoric mood and sleep disturbance. The patient is nervous/anxious (better).        /68 (BP Location: Left arm, Patient Position: Sitting)   Pulse 69   Ht 152.4 cm (60\")   Wt 49 kg (108 lb)   SpO2 100%   BMI 21.09 kg/m²       Physical Exam  Constitutional:       Appearance: Normal appearance. She is well-developed.   HENT:      Head: Normocephalic and atraumatic.      Right Ear: External ear normal.      Left Ear: External ear normal.      Nose: Nose normal.      Mouth/Throat:      Mouth: Mucous membranes are moist.      Pharynx: Oropharynx is clear.   Eyes:      Extraocular Movements: Extraocular movements intact.      Conjunctiva/sclera: Conjunctivae normal.      Pupils: Pupils are equal, round, and reactive to light.   Cardiovascular:      Rate and Rhythm: Normal rate and regular rhythm.      Heart sounds: Normal heart sounds.   Pulmonary:      Effort: Pulmonary effort is normal.      Breath sounds: Normal breath sounds.   Abdominal:      General: Bowel sounds are normal.      Palpations: Abdomen is soft.   Musculoskeletal:         General: " Deformity (kyphosis) present. Normal range of motion.      Cervical back: Normal range of motion and neck supple.      Left lower leg: Edema present.   Lymphadenopathy:      Cervical: No cervical adenopathy.   Skin:     General: Skin is warm and dry.      Comments: Linear skin tear, 4 in , with scant fresh blood, left ventral distal forearm   Neurological:      General: No focal deficit present.      Mental Status: She is alert and oriented to person, place, and time.   Psychiatric:         Mood and Affect: Mood normal.         Behavior: Behavior normal.         Thought Content: Thought content normal.         Procedure:      Historical Data:    HTN-cont ziac, advised goal of 130/80, advised to notify if gets dizzy sec to low HR  PUD-controlled on PPI   Rhinitis-cont claritin  Osteoporosis-Advised weight bearing exercise and at least vit D 2000 IU qd, on Prolia  Abnormal glucose-counseled on diet, labs at goal  Breast cancer-cont Arimidex  Left forearm skin tear-Bactroban and cover with NonStick gauze  Elevated LDL-handout on low chol diet given     4/4 labs noted and dw patient, will send out diet handout    Current Outpatient Medications:     anastrozole (ARIMIDEX) 1 MG tablet, Take 1 tablet by mouth Daily., Disp: 90 tablet, Rfl: 3    betamethasone dipropionate (DIPROSONE) 0.05 % cream, Apply 1 Application topically to the appropriate area as directed 2 (Two) Times a Day As Needed for Irritation or Rash., Disp: 45 g, Rfl: 1    bisoprolol-hydrochlorothiazide (ZIAC) 2.5-6.25 MG per tablet, Take 1 tablet by mouth Every Morning., Disp: 90 tablet, Rfl: 3    Calcium Carb-Cholecalciferol (CALCIUM 1000 + D PO), Take 1 tablet/day by mouth. Vitamin D 2,000 mg, Disp: , Rfl:     citalopram (CeleXA) 10 MG tablet, Take 1 tablet by mouth Every Morning., Disp: 90 tablet, Rfl: 3    denosumab (Prolia) 60 MG/ML solution prefilled syringe syringe, Inject 1 mL under the skin into the appropriate area as directed 1 (One) Time. Every 6  months, Disp: , Rfl:     omeprazole (priLOSEC) 40 MG capsule, Take 1 capsule by mouth 2 (Two) Times a Day., Disp: 180 capsule, Rfl: 3    ondansetron ODT (ZOFRAN-ODT) 4 MG disintegrating tablet, Place 1 tablet on the tongue Every 8 (Eight) Hours As Needed for Nausea or Vomiting., Disp: 10 tablet, Rfl: 0        Diagnoses and all orders for this visit:    1. Essential hypertension (Primary)    2. PUD (peptic ulcer disease)    3. Invasive ductal carcinoma of breast, female, left    4. Osteoporosis, unspecified osteoporosis type, unspecified pathological fracture presence    5. Skin tear of left forearm without complication, initial encounter    6. Elevated LDL cholesterol level

## 2025-08-01 ENCOUNTER — OFFICE VISIT (OUTPATIENT)
Dept: INTERNAL MEDICINE | Age: 85
End: 2025-08-01
Payer: MEDICARE

## 2025-08-01 VITALS
OXYGEN SATURATION: 98 % | HEIGHT: 60 IN | WEIGHT: 108.8 LBS | HEART RATE: 78 BPM | SYSTOLIC BLOOD PRESSURE: 130 MMHG | TEMPERATURE: 97.6 F | DIASTOLIC BLOOD PRESSURE: 70 MMHG | BODY MASS INDEX: 21.36 KG/M2

## 2025-08-01 DIAGNOSIS — M54.2 ACUTE NECK PAIN: ICD-10-CM

## 2025-08-01 DIAGNOSIS — G44.52 NEW DAILY PERSISTENT HEADACHE: Primary | ICD-10-CM

## 2025-08-01 PROCEDURE — 1159F MED LIST DOCD IN RCRD: CPT | Performed by: NURSE PRACTITIONER

## 2025-08-01 PROCEDURE — 3078F DIAST BP <80 MM HG: CPT | Performed by: NURSE PRACTITIONER

## 2025-08-01 PROCEDURE — 1160F RVW MEDS BY RX/DR IN RCRD: CPT | Performed by: NURSE PRACTITIONER

## 2025-08-01 PROCEDURE — 99213 OFFICE O/P EST LOW 20 MIN: CPT | Performed by: NURSE PRACTITIONER

## 2025-08-01 PROCEDURE — 3075F SYST BP GE 130 - 139MM HG: CPT | Performed by: NURSE PRACTITIONER

## 2025-08-01 PROCEDURE — 1126F AMNT PAIN NOTED NONE PRSNT: CPT | Performed by: NURSE PRACTITIONER

## 2025-08-01 NOTE — PROGRESS NOTES
"Chief Complaint   Patient presents with    Headache     Patient states the pain goes into her neck         HPI  Cecile Bennett is a 85 y.o. female presents with neck pain and a headache.  This started about 10 days ago.  She states when she woke up this morning the severity was about 8/10.  She complains of dizziness but that is normal for her.  No trauma.  The pain starts in the top of her head and moves down to her neck.  Tylenol helps but the headache returns before bedtime.  She has never had a headache like this before.    The following portions of the patient's history were reviewed and updated as appropriate: allergies, current medications, past family history, past medical history, past social history, past surgical history, and problem list.    Subjective  Review of Systems   Eyes:  Negative for blurred vision and visual disturbance.   Neurological:  Positive for dizziness and headache. Negative for confusion.       Objective  Visit Vitals  /70 (BP Location: Left arm, Patient Position: Sitting)   Pulse 78   Temp 97.6 °F (36.4 °C)   Ht 152.4 cm (60\")   Wt 49.4 kg (108 lb 12.8 oz)   SpO2 98%   BMI 21.25 kg/m²        Physical Exam  Vitals and nursing note reviewed.   HENT:      Head: Normocephalic.      Right Ear: There is impacted cerumen.      Left Ear: Tympanic membrane, ear canal and external ear normal.   Eyes:      Pupils: Pupils are equal, round, and reactive to light.   Cardiovascular:      Rate and Rhythm: Normal rate and regular rhythm.      Pulses: Normal pulses.      Heart sounds: Normal heart sounds.   Pulmonary:      Effort: Pulmonary effort is normal. No respiratory distress.      Breath sounds: Normal breath sounds.   Skin:     General: Skin is warm and dry.      Capillary Refill: Capillary refill takes less than 2 seconds.   Neurological:      General: No focal deficit present.      Mental Status: She is alert and oriented to person, place, and time.      GCS: GCS eye subscore " is 4. GCS verbal subscore is 5. GCS motor subscore is 6.      Cranial Nerves: Cranial nerves 2-12 are intact.      Motor: Motor function is intact.      Gait: Gait is intact.   Psychiatric:         Attention and Perception: Attention normal.         Mood and Affect: Mood normal.         Behavior: Behavior normal.         Thought Content: Thought content normal.          Procedures     Assessment and Plan  Diagnoses and all orders for this visit:    1. New daily persistent headache (Primary)  -     CT Head Without Contrast; Future    2. Acute neck pain    Never had HA like this before  Send for stat CT of head - will call with results - if neg will consider neck as source of HA  Instructed to the ED if HA worsens this weekend    Return for Next scheduled follow up.      ADELE Corrigan

## 2025-08-02 ENCOUNTER — HOSPITAL ENCOUNTER (OUTPATIENT)
Dept: CT IMAGING | Facility: HOSPITAL | Age: 85
Discharge: HOME | End: 2025-08-02
Payer: MEDICARE

## 2025-08-02 DIAGNOSIS — G44.52 NEW DAILY PERSISTENT HEADACHE: ICD-10-CM

## 2025-08-02 PROCEDURE — 70450 CT HEAD/BRAIN W/O DYE: CPT

## 2025-08-12 ENCOUNTER — OFFICE VISIT (OUTPATIENT)
Dept: ONCOLOGY | Facility: CLINIC | Age: 85
End: 2025-08-12
Payer: MEDICARE

## 2025-08-12 ENCOUNTER — LAB (OUTPATIENT)
Dept: LAB | Facility: HOSPITAL | Age: 85
End: 2025-08-12
Payer: MEDICARE

## 2025-08-12 ENCOUNTER — HOSPITAL ENCOUNTER (OUTPATIENT)
Dept: ONCOLOGY | Facility: HOSPITAL | Age: 85
Discharge: HOME | End: 2025-08-12
Payer: MEDICARE

## 2025-08-12 VITALS
WEIGHT: 108 LBS | BODY MASS INDEX: 21.2 KG/M2 | HEART RATE: 58 BPM | TEMPERATURE: 97.7 F | SYSTOLIC BLOOD PRESSURE: 134 MMHG | HEIGHT: 60 IN | DIASTOLIC BLOOD PRESSURE: 67 MMHG | RESPIRATION RATE: 18 BRPM | OXYGEN SATURATION: 98 %

## 2025-08-12 DIAGNOSIS — M81.0 OSTEOPOROSIS, UNSPECIFIED OSTEOPOROSIS TYPE, UNSPECIFIED PATHOLOGICAL FRACTURE PRESENCE: Primary | ICD-10-CM

## 2025-08-12 DIAGNOSIS — C50.912 INVASIVE DUCTAL CARCINOMA OF BREAST, FEMALE, LEFT: ICD-10-CM

## 2025-08-12 DIAGNOSIS — M81.0 OSTEOPOROSIS, UNSPECIFIED OSTEOPOROSIS TYPE, UNSPECIFIED PATHOLOGICAL FRACTURE PRESENCE: ICD-10-CM

## 2025-08-12 LAB
ALBUMIN SERPL-MCNC: 4.1 G/DL (ref 3.5–5.2)
ALBUMIN/GLOB SERPL: 1.4 G/DL
ALP SERPL-CCNC: 78 U/L (ref 39–117)
ALT SERPL W P-5'-P-CCNC: 13 U/L (ref 1–33)
ANION GAP SERPL CALCULATED.3IONS-SCNC: 10 MMOL/L (ref 5–15)
AST SERPL-CCNC: 30 U/L (ref 1–32)
BASOPHILS # BLD AUTO: 0.02 10*3/MM3 (ref 0–0.2)
BASOPHILS NFR BLD AUTO: 0.3 % (ref 0–1.5)
BILIRUB SERPL-MCNC: 0.4 MG/DL (ref 0–1.2)
BUN SERPL-MCNC: 14.9 MG/DL (ref 8–23)
BUN/CREAT SERPL: 16.2 (ref 7–25)
CALCIUM SPEC-SCNC: 9.5 MG/DL (ref 8.6–10.5)
CHLORIDE SERPL-SCNC: 103 MMOL/L (ref 98–107)
CO2 SERPL-SCNC: 28 MMOL/L (ref 22–29)
CREAT SERPL-MCNC: 0.92 MG/DL (ref 0.57–1)
DEPRECATED RDW RBC AUTO: 47.1 FL (ref 37–54)
EGFRCR SERPLBLD CKD-EPI 2021: 61.9 ML/MIN/1.73
EOSINOPHIL # BLD AUTO: 0.14 10*3/MM3 (ref 0–0.4)
EOSINOPHIL NFR BLD AUTO: 2.1 % (ref 0.3–6.2)
ERYTHROCYTE [DISTWIDTH] IN BLOOD BY AUTOMATED COUNT: 13.7 % (ref 12.3–15.4)
GLOBULIN UR ELPH-MCNC: 3 GM/DL
GLUCOSE SERPL-MCNC: 154 MG/DL (ref 65–99)
HCT VFR BLD AUTO: 38.3 % (ref 34–46.6)
HGB BLD-MCNC: 12.3 G/DL (ref 12–15.9)
IMM GRANULOCYTES # BLD AUTO: 0 10*3/MM3 (ref 0–0.05)
IMM GRANULOCYTES NFR BLD AUTO: 0 % (ref 0–0.5)
LYMPHOCYTES # BLD AUTO: 1.83 10*3/MM3 (ref 0.7–3.1)
LYMPHOCYTES NFR BLD AUTO: 27.5 % (ref 19.6–45.3)
MAGNESIUM SERPL-MCNC: 1.9 MG/DL (ref 1.6–2.4)
MCH RBC QN AUTO: 29.4 PG (ref 26.6–33)
MCHC RBC AUTO-ENTMCNC: 32.1 G/DL (ref 31.5–35.7)
MCV RBC AUTO: 91.6 FL (ref 79–97)
MONOCYTES # BLD AUTO: 0.59 10*3/MM3 (ref 0.1–0.9)
MONOCYTES NFR BLD AUTO: 8.9 % (ref 5–12)
NEUTROPHILS NFR BLD AUTO: 4.08 10*3/MM3 (ref 1.7–7)
NEUTROPHILS NFR BLD AUTO: 61.2 % (ref 42.7–76)
PHOSPHATE SERPL-MCNC: 2.8 MG/DL (ref 2.5–4.5)
PLATELET # BLD AUTO: 211 10*3/MM3 (ref 140–450)
PMV BLD AUTO: 10.5 FL (ref 6–12)
POTASSIUM SERPL-SCNC: 3.6 MMOL/L (ref 3.5–5.2)
PROT SERPL-MCNC: 7.1 G/DL (ref 6–8.5)
RBC # BLD AUTO: 4.18 10*6/MM3 (ref 3.77–5.28)
SODIUM SERPL-SCNC: 141 MMOL/L (ref 136–145)
WBC NRBC COR # BLD AUTO: 6.66 10*3/MM3 (ref 3.4–10.8)

## 2025-08-12 PROCEDURE — 25010000002 DENOSUMAB 60 MG/ML SOLUTION PREFILLED SYRINGE: Performed by: INTERNAL MEDICINE

## 2025-08-12 PROCEDURE — 1126F AMNT PAIN NOTED NONE PRSNT: CPT | Performed by: NURSE PRACTITIONER

## 2025-08-12 PROCEDURE — 96372 THER/PROPH/DIAG INJ SC/IM: CPT

## 2025-08-12 PROCEDURE — 85025 COMPLETE CBC W/AUTO DIFF WBC: CPT

## 2025-08-12 PROCEDURE — 99214 OFFICE O/P EST MOD 30 MIN: CPT | Performed by: NURSE PRACTITIONER

## 2025-08-12 PROCEDURE — 84100 ASSAY OF PHOSPHORUS: CPT

## 2025-08-12 PROCEDURE — 83735 ASSAY OF MAGNESIUM: CPT

## 2025-08-12 PROCEDURE — 80053 COMPREHEN METABOLIC PANEL: CPT

## 2025-08-12 PROCEDURE — 36415 COLL VENOUS BLD VENIPUNCTURE: CPT

## 2025-08-12 PROCEDURE — 3075F SYST BP GE 130 - 139MM HG: CPT | Performed by: NURSE PRACTITIONER

## 2025-08-12 PROCEDURE — 3078F DIAST BP <80 MM HG: CPT | Performed by: NURSE PRACTITIONER

## 2025-08-12 RX ADMIN — DENOSUMAB 60 MG: 60 INJECTION SUBCUTANEOUS at 15:39

## 2025-08-21 ENCOUNTER — OFFICE VISIT (OUTPATIENT)
Dept: INTERNAL MEDICINE | Age: 85
End: 2025-08-21
Payer: MEDICARE

## 2025-08-21 VITALS
HEIGHT: 60 IN | HEART RATE: 44 BPM | OXYGEN SATURATION: 99 % | SYSTOLIC BLOOD PRESSURE: 120 MMHG | WEIGHT: 107 LBS | DIASTOLIC BLOOD PRESSURE: 74 MMHG | BODY MASS INDEX: 21.01 KG/M2

## 2025-08-21 DIAGNOSIS — C50.912 INVASIVE DUCTAL CARCINOMA OF BREAST, FEMALE, LEFT: ICD-10-CM

## 2025-08-21 DIAGNOSIS — G44.52 NEW DAILY PERSISTENT HEADACHE: ICD-10-CM

## 2025-08-21 DIAGNOSIS — E78.00 ELEVATED LDL CHOLESTEROL LEVEL: ICD-10-CM

## 2025-08-21 DIAGNOSIS — I10 ESSENTIAL HYPERTENSION: Primary | ICD-10-CM

## 2025-08-21 DIAGNOSIS — R42 DIZZINESS: ICD-10-CM

## 2025-08-21 DIAGNOSIS — K27.9 PUD (PEPTIC ULCER DISEASE): ICD-10-CM

## 2025-08-21 RX ORDER — AMLODIPINE BESYLATE 2.5 MG/1
2.5 TABLET ORAL NIGHTLY
Qty: 30 TABLET | Refills: 5 | Status: SHIPPED | OUTPATIENT
Start: 2025-08-21

## 2025-08-21 RX ORDER — OMEPRAZOLE 40 MG/1
40 CAPSULE, DELAYED RELEASE ORAL
Qty: 90 CAPSULE | Refills: 3 | Status: SHIPPED | OUTPATIENT
Start: 2025-08-21

## (undated) DEVICE — PROXIMATE RH ROTATING HEAD SKIN STAPLERS (35 WIDE) CONTAINS 35 STAINLESS STEEL STAPLES: Brand: PROXIMATE

## (undated) DEVICE — APPL CHLORAPREP W/TINT 26ML BLU

## (undated) DEVICE — SPNG LAP PREWSH SFTPK 18X18IN STRL PK/5

## (undated) DEVICE — LEX GENERAL BREAST: Brand: MEDLINE INDUSTRIES, INC.

## (undated) DEVICE — ANTIBACTERIAL UNDYED BRAIDED (POLYGLACTIN 910), SYNTHETIC ABSORBABLE SUTURE: Brand: COATED VICRYL

## (undated) DEVICE — GLV SURG SENSICARE PI ORTHO SZ7.5 LF STRL

## (undated) DEVICE — SUT SILK 3/0 TIES 18IN A184H

## (undated) DEVICE — TRAP FLD MINIVAC MEGADYNE 100ML

## (undated) DEVICE — GOWN,NON-REINFORCED,SIRUS,SET IN SLV,XL: Brand: MEDLINE

## (undated) DEVICE — DRSNG WND BORDR/ADHS NONADHR/GZ LF 2X2IN STRL

## (undated) DEVICE — CVR PROB TRANSD 3D CIV FLEX FOLD 5.5X36IN

## (undated) DEVICE — SUT SILK 2/0 PS 18IN 1588H

## (undated) DEVICE — DRSNG WND GZ PAD BORDERED 4X8IN STRL

## (undated) DEVICE — DISH PETRI 3.5IN MD STRL LF